# Patient Record
Sex: FEMALE | Race: WHITE | Employment: OTHER | URBAN - METROPOLITAN AREA
[De-identification: names, ages, dates, MRNs, and addresses within clinical notes are randomized per-mention and may not be internally consistent; named-entity substitution may affect disease eponyms.]

---

## 2018-06-20 ENCOUNTER — APPOINTMENT (OUTPATIENT)
Dept: CT IMAGING | Age: 73
DRG: 552 | End: 2018-06-20
Payer: MEDICARE

## 2018-06-20 ENCOUNTER — APPOINTMENT (OUTPATIENT)
Dept: GENERAL RADIOLOGY | Age: 73
DRG: 552 | End: 2018-06-20
Payer: MEDICARE

## 2018-06-20 ENCOUNTER — HOSPITAL ENCOUNTER (INPATIENT)
Age: 73
LOS: 4 days | Discharge: HOME HEALTH | DRG: 552 | End: 2018-06-24
Admitting: INTERNAL MEDICINE
Payer: MEDICARE

## 2018-06-20 DIAGNOSIS — R42 DIZZINESS: ICD-10-CM

## 2018-06-20 DIAGNOSIS — D64.9 ANEMIA, UNSPECIFIED TYPE: Primary | ICD-10-CM

## 2018-06-20 DIAGNOSIS — R55 NEAR SYNCOPE: ICD-10-CM

## 2018-06-20 LAB
-: ABNORMAL
ABSOLUTE EOS #: 0 K/UL (ref 0–0.4)
ABSOLUTE IMMATURE GRANULOCYTE: ABNORMAL K/UL (ref 0–0.3)
ABSOLUTE LYMPH #: 1.3 K/UL (ref 1–4.8)
ABSOLUTE MONO #: 0.4 K/UL (ref 0.2–0.8)
ALBUMIN SERPL-MCNC: 4.1 G/DL (ref 3.5–5.2)
ALBUMIN/GLOBULIN RATIO: ABNORMAL (ref 1–2.5)
ALP BLD-CCNC: 111 U/L (ref 35–104)
ALT SERPL-CCNC: 18 U/L (ref 5–33)
AMORPHOUS: ABNORMAL
AMYLASE: 31 U/L (ref 28–100)
ANION GAP SERPL CALCULATED.3IONS-SCNC: 24 MMOL/L (ref 9–17)
AST SERPL-CCNC: 42 U/L
BACTERIA: ABNORMAL
BASOPHILS # BLD: 1 % (ref 0–2)
BASOPHILS ABSOLUTE: 0.1 K/UL (ref 0–0.2)
BILIRUB SERPL-MCNC: 0.57 MG/DL (ref 0.3–1.2)
BILIRUBIN DIRECT: 0.19 MG/DL
BILIRUBIN URINE: NEGATIVE
BILIRUBIN, INDIRECT: 0.38 MG/DL (ref 0–1)
BNP INTERPRETATION: ABNORMAL
BUN BLDV-MCNC: 6 MG/DL (ref 8–23)
BUN/CREAT BLD: ABNORMAL (ref 9–20)
CALCIUM SERPL-MCNC: 9.1 MG/DL (ref 8.6–10.4)
CASTS UA: ABNORMAL /LPF
CHLORIDE BLD-SCNC: 83 MMOL/L (ref 98–107)
CO2: 22 MMOL/L (ref 20–31)
COLOR: YELLOW
COMMENT UA: ABNORMAL
CREAT SERPL-MCNC: <0.4 MG/DL (ref 0.5–0.9)
CRYSTALS, UA: ABNORMAL /HPF
DATE, STOOL #1: NORMAL
DATE, STOOL #2: NORMAL
DATE, STOOL #3: NORMAL
DIFFERENTIAL TYPE: ABNORMAL
EKG ATRIAL RATE: 101 BPM
EKG P AXIS: 4 DEGREES
EKG P-R INTERVAL: 136 MS
EKG Q-T INTERVAL: 356 MS
EKG QRS DURATION: 84 MS
EKG QTC CALCULATION (BAZETT): 461 MS
EKG R AXIS: 59 DEGREES
EKG T AXIS: 57 DEGREES
EKG VENTRICULAR RATE: 101 BPM
EOSINOPHILS RELATIVE PERCENT: 1 % (ref 1–4)
EPITHELIAL CELLS UA: ABNORMAL /HPF (ref 0–5)
ETHANOL PERCENT: <0.01 %
ETHANOL: <10 MG/DL
GFR AFRICAN AMERICAN: ABNORMAL ML/MIN
GFR NON-AFRICAN AMERICAN: ABNORMAL ML/MIN
GFR SERPL CREATININE-BSD FRML MDRD: ABNORMAL ML/MIN/{1.73_M2}
GFR SERPL CREATININE-BSD FRML MDRD: ABNORMAL ML/MIN/{1.73_M2}
GLOBULIN: ABNORMAL G/DL (ref 1.5–3.8)
GLUCOSE BLD-MCNC: 82 MG/DL (ref 70–99)
GLUCOSE URINE: NEGATIVE
HCT VFR BLD CALC: 25.1 % (ref 36–46)
HEMOCCULT SP1 STL QL: NEGATIVE
HEMOCCULT SP2 STL QL: NORMAL
HEMOCCULT SP3 STL QL: NORMAL
HEMOGLOBIN: 8.1 G/DL (ref 12–16)
IMMATURE GRANULOCYTES: ABNORMAL %
KETONES, URINE: ABNORMAL
LACTIC ACID: 1 MMOL/L (ref 0.5–2.2)
LACTIC ACID: 2.3 MMOL/L (ref 0.5–2.2)
LEUKOCYTE ESTERASE, URINE: ABNORMAL
LIPASE: 18 U/L (ref 13–60)
LYMPHOCYTES # BLD: 18 % (ref 24–44)
MCH RBC QN AUTO: 29.5 PG (ref 26–34)
MCHC RBC AUTO-ENTMCNC: 32.1 G/DL (ref 31–37)
MCV RBC AUTO: 91.9 FL (ref 80–100)
MONOCYTES # BLD: 6 % (ref 1–7)
MUCUS: ABNORMAL
MYOGLOBIN: <21 NG/ML (ref 25–58)
MYOGLOBIN: <21 NG/ML (ref 25–58)
NITRITE, URINE: NEGATIVE
NRBC AUTOMATED: ABNORMAL PER 100 WBC
OTHER OBSERVATIONS UA: ABNORMAL
PDW BLD-RTO: 18.5 % (ref 11.5–14.5)
PH UA: 7 (ref 5–8)
PLATELET # BLD: 350 K/UL (ref 130–400)
PLATELET ESTIMATE: ABNORMAL
PMV BLD AUTO: 8.2 FL (ref 6–12)
POTASSIUM SERPL-SCNC: 3.5 MMOL/L (ref 3.7–5.3)
PRO-BNP: 372 PG/ML
PROTEIN UA: NEGATIVE
RBC # BLD: 2.74 M/UL (ref 4–5.2)
RBC # BLD: ABNORMAL 10*6/UL
RBC UA: ABNORMAL /HPF (ref 0–2)
RENAL EPITHELIAL, UA: ABNORMAL /HPF
SEG NEUTROPHILS: 74 % (ref 36–66)
SEGMENTED NEUTROPHILS ABSOLUTE COUNT: 5.4 K/UL (ref 1.8–7.7)
SODIUM BLD-SCNC: 129 MMOL/L (ref 135–144)
SPECIFIC GRAVITY UA: 1.07 (ref 1–1.03)
TIME, STOOL #1: 1815
TIME, STOOL #2: NORMAL
TIME, STOOL #3: NORMAL
TOTAL PROTEIN: 6.5 G/DL (ref 6.4–8.3)
TRICHOMONAS: ABNORMAL
TROPONIN INTERP: ABNORMAL
TROPONIN INTERP: ABNORMAL
TROPONIN T: <0.03 NG/ML
TROPONIN T: <0.03 NG/ML
TURBIDITY: CLEAR
URINE HGB: NEGATIVE
UROBILINOGEN, URINE: NORMAL
WBC # BLD: 7.2 K/UL (ref 3.5–11)
WBC # BLD: ABNORMAL 10*3/UL
WBC UA: ABNORMAL /HPF (ref 0–5)
YEAST: ABNORMAL

## 2018-06-20 PROCEDURE — 99285 EMERGENCY DEPT VISIT HI MDM: CPT

## 2018-06-20 PROCEDURE — 83605 ASSAY OF LACTIC ACID: CPT

## 2018-06-20 PROCEDURE — 83880 ASSAY OF NATRIURETIC PEPTIDE: CPT

## 2018-06-20 PROCEDURE — 6360000004 HC RX CONTRAST MEDICATION: Performed by: NURSE PRACTITIONER

## 2018-06-20 PROCEDURE — 93005 ELECTROCARDIOGRAM TRACING: CPT

## 2018-06-20 PROCEDURE — 81001 URINALYSIS AUTO W/SCOPE: CPT

## 2018-06-20 PROCEDURE — 71045 X-RAY EXAM CHEST 1 VIEW: CPT

## 2018-06-20 PROCEDURE — 1200000000 HC SEMI PRIVATE

## 2018-06-20 PROCEDURE — 87086 URINE CULTURE/COLONY COUNT: CPT

## 2018-06-20 PROCEDURE — 87329 GIARDIA AG IA: CPT

## 2018-06-20 PROCEDURE — 84484 ASSAY OF TROPONIN QUANT: CPT

## 2018-06-20 PROCEDURE — 72100 X-RAY EXAM L-S SPINE 2/3 VWS: CPT

## 2018-06-20 PROCEDURE — 87425 ROTAVIRUS AG IA: CPT

## 2018-06-20 PROCEDURE — 83690 ASSAY OF LIPASE: CPT

## 2018-06-20 PROCEDURE — 70450 CT HEAD/BRAIN W/O DYE: CPT

## 2018-06-20 PROCEDURE — 74177 CT ABD & PELVIS W/CONTRAST: CPT

## 2018-06-20 PROCEDURE — 87505 NFCT AGENT DETECTION GI: CPT

## 2018-06-20 PROCEDURE — 73521 X-RAY EXAM HIPS BI 2 VIEWS: CPT

## 2018-06-20 PROCEDURE — 72125 CT NECK SPINE W/O DYE: CPT

## 2018-06-20 PROCEDURE — 96360 HYDRATION IV INFUSION INIT: CPT

## 2018-06-20 PROCEDURE — 83874 ASSAY OF MYOGLOBIN: CPT

## 2018-06-20 PROCEDURE — 82150 ASSAY OF AMYLASE: CPT

## 2018-06-20 PROCEDURE — 80048 BASIC METABOLIC PNL TOTAL CA: CPT

## 2018-06-20 PROCEDURE — 87493 C DIFF AMPLIFIED PROBE: CPT

## 2018-06-20 PROCEDURE — 96361 HYDRATE IV INFUSION ADD-ON: CPT

## 2018-06-20 PROCEDURE — 82272 OCCULT BLD FECES 1-3 TESTS: CPT

## 2018-06-20 PROCEDURE — 2580000003 HC RX 258: Performed by: NURSE PRACTITIONER

## 2018-06-20 PROCEDURE — 71250 CT THORAX DX C-: CPT

## 2018-06-20 PROCEDURE — 85025 COMPLETE CBC W/AUTO DIFF WBC: CPT

## 2018-06-20 PROCEDURE — G0480 DRUG TEST DEF 1-7 CLASSES: HCPCS

## 2018-06-20 PROCEDURE — 82705 FATS/LIPIDS FECES QUAL: CPT

## 2018-06-20 PROCEDURE — 80076 HEPATIC FUNCTION PANEL: CPT

## 2018-06-20 RX ORDER — 0.9 % SODIUM CHLORIDE 0.9 %
80 INTRAVENOUS SOLUTION INTRAVENOUS ONCE
Status: COMPLETED | OUTPATIENT
Start: 2018-06-20 | End: 2018-06-20

## 2018-06-20 RX ORDER — SODIUM CHLORIDE 9 MG/ML
INJECTION, SOLUTION INTRAVENOUS CONTINUOUS
Status: DISCONTINUED | OUTPATIENT
Start: 2018-06-20 | End: 2018-06-21

## 2018-06-20 RX ORDER — SODIUM CHLORIDE 0.9 % (FLUSH) 0.9 %
10 SYRINGE (ML) INJECTION PRN
Status: DISCONTINUED | OUTPATIENT
Start: 2018-06-20 | End: 2018-06-21

## 2018-06-20 RX ORDER — 0.9 % SODIUM CHLORIDE 0.9 %
500 INTRAVENOUS SOLUTION INTRAVENOUS ONCE
Status: COMPLETED | OUTPATIENT
Start: 2018-06-20 | End: 2018-06-20

## 2018-06-20 RX ADMIN — Medication 10 ML: at 17:59

## 2018-06-20 RX ADMIN — SODIUM CHLORIDE: 9 INJECTION, SOLUTION INTRAVENOUS at 18:09

## 2018-06-20 RX ADMIN — SODIUM CHLORIDE 80 ML: 9 INJECTION, SOLUTION INTRAVENOUS at 17:59

## 2018-06-20 RX ADMIN — SODIUM CHLORIDE 500 ML: 0.9 INJECTION, SOLUTION INTRAVENOUS at 17:15

## 2018-06-20 RX ADMIN — IOPAMIDOL 75 ML: 755 INJECTION, SOLUTION INTRAVENOUS at 17:59

## 2018-06-20 ASSESSMENT — ENCOUNTER SYMPTOMS
DIARRHEA: 1
SHORTNESS OF BREATH: 0
NAUSEA: 1
COLOR CHANGE: 0
ABDOMINAL PAIN: 1
COUGH: 0

## 2018-06-20 ASSESSMENT — PAIN DESCRIPTION - PAIN TYPE: TYPE: ACUTE PAIN

## 2018-06-20 ASSESSMENT — PAIN DESCRIPTION - DESCRIPTORS: DESCRIPTORS: ACHING

## 2018-06-20 ASSESSMENT — PAIN SCALES - GENERAL: PAINLEVEL_OUTOF10: 8

## 2018-06-21 ENCOUNTER — APPOINTMENT (OUTPATIENT)
Dept: MRI IMAGING | Age: 73
DRG: 552 | End: 2018-06-21
Payer: MEDICARE

## 2018-06-21 PROBLEM — Z72.0 TOBACCO ABUSE: Status: ACTIVE | Noted: 2018-06-21

## 2018-06-21 PROBLEM — F10.10 CHRONIC ALCOHOL ABUSE: Status: ACTIVE | Noted: 2018-06-21

## 2018-06-21 PROBLEM — R91.8 PULMONARY NODULES/LESIONS, MULTIPLE: Status: ACTIVE | Noted: 2018-06-21

## 2018-06-21 PROBLEM — R29.6 RECURRENT FALLS WHILE WALKING: Status: ACTIVE | Noted: 2018-06-21

## 2018-06-21 PROBLEM — I73.9 PVD (PERIPHERAL VASCULAR DISEASE) (HCC): Status: ACTIVE | Noted: 2018-06-21

## 2018-06-21 LAB
ABSOLUTE EOS #: 0.1 K/UL (ref 0–0.4)
ABSOLUTE IMMATURE GRANULOCYTE: ABNORMAL K/UL (ref 0–0.3)
ABSOLUTE LYMPH #: 1.6 K/UL (ref 1–4.8)
ABSOLUTE MONO #: 0.8 K/UL (ref 0.2–0.8)
ABSOLUTE RETIC #: 0.01 M/UL (ref 0.03–0.08)
ANION GAP SERPL CALCULATED.3IONS-SCNC: 23 MMOL/L (ref 9–17)
BASOPHILS # BLD: 1 % (ref 0–2)
BASOPHILS ABSOLUTE: 0 K/UL (ref 0–0.2)
BUN BLDV-MCNC: 5 MG/DL (ref 8–23)
BUN/CREAT BLD: ABNORMAL (ref 9–20)
CALCIUM SERPL-MCNC: 8.3 MG/DL (ref 8.6–10.4)
CHLORIDE BLD-SCNC: 88 MMOL/L (ref 98–107)
CO2: 19 MMOL/L (ref 20–31)
CREAT SERPL-MCNC: <0.4 MG/DL (ref 0.5–0.9)
CULTURE: NORMAL
DIFFERENTIAL TYPE: ABNORMAL
EOSINOPHILS RELATIVE PERCENT: 2 % (ref 1–4)
FERRITIN: 841 UG/L (ref 13–150)
FOLATE: 5.7 NG/ML
GFR AFRICAN AMERICAN: ABNORMAL ML/MIN
GFR NON-AFRICAN AMERICAN: ABNORMAL ML/MIN
GFR SERPL CREATININE-BSD FRML MDRD: ABNORMAL ML/MIN/{1.73_M2}
GFR SERPL CREATININE-BSD FRML MDRD: ABNORMAL ML/MIN/{1.73_M2}
GLUCOSE BLD-MCNC: 70 MG/DL (ref 70–99)
HCT VFR BLD CALC: 22.1 % (ref 36–46)
HCT VFR BLD CALC: 23.1 % (ref 36–46)
HEMOGLOBIN: 7.2 G/DL (ref 12–16)
HEMOGLOBIN: 7.6 G/DL (ref 12–16)
IMMATURE GRANULOCYTES: ABNORMAL %
IMMATURE RETIC FRACT: 10.1 % (ref 2.7–18.3)
INR BLD: 1
IRON SATURATION: ABNORMAL % (ref 20–55)
IRON: 179 UG/DL (ref 37–145)
LYMPHOCYTES # BLD: 29 % (ref 24–44)
Lab: NORMAL
MAGNESIUM: 1.6 MG/DL (ref 1.6–2.6)
MCH RBC QN AUTO: 30 PG (ref 26–34)
MCHC RBC AUTO-ENTMCNC: 32.6 G/DL (ref 31–37)
MCV RBC AUTO: 91.9 FL (ref 80–100)
MONOCYTES # BLD: 14 % (ref 1–7)
NRBC AUTOMATED: ABNORMAL PER 100 WBC
PDW BLD-RTO: 18.3 % (ref 11.5–14.5)
PLATELET # BLD: 316 K/UL (ref 130–400)
PLATELET ESTIMATE: ABNORMAL
PMV BLD AUTO: 8.4 FL (ref 6–12)
POTASSIUM SERPL-SCNC: 3.3 MMOL/L (ref 3.7–5.3)
PROTHROMBIN TIME: 10.6 SEC (ref 9.7–11.6)
RBC # BLD: 2.4 M/UL (ref 4–5.2)
RBC # BLD: ABNORMAL 10*6/UL
RETIC %: 0.5 % (ref 0.5–1.9)
RETIC HEMOGLOBIN: 27.6 PG (ref 28.2–35.7)
SEG NEUTROPHILS: 54 % (ref 36–66)
SEGMENTED NEUTROPHILS ABSOLUTE COUNT: 3 K/UL (ref 1.8–7.7)
SODIUM BLD-SCNC: 130 MMOL/L (ref 135–144)
SPECIMEN DESCRIPTION: NORMAL
STATUS: NORMAL
SURGICAL PATHOLOGY REPORT: NORMAL
TOTAL IRON BINDING CAPACITY: ABNORMAL UG/DL (ref 250–450)
UNSATURATED IRON BINDING CAPACITY: <17 UG/DL (ref 112–347)
VITAMIN B-12: 715 PG/ML (ref 232–1245)
WBC # BLD: 5.6 K/UL (ref 3.5–11)
WBC # BLD: ABNORMAL 10*3/UL

## 2018-06-21 PROCEDURE — 1200000000 HC SEMI PRIVATE

## 2018-06-21 PROCEDURE — 2580000003 HC RX 258: Performed by: NURSE PRACTITIONER

## 2018-06-21 PROCEDURE — C9113 INJ PANTOPRAZOLE SODIUM, VIA: HCPCS | Performed by: NURSE PRACTITIONER

## 2018-06-21 PROCEDURE — G8987 SELF CARE CURRENT STATUS: HCPCS

## 2018-06-21 PROCEDURE — 83735 ASSAY OF MAGNESIUM: CPT

## 2018-06-21 PROCEDURE — 94640 AIRWAY INHALATION TREATMENT: CPT

## 2018-06-21 PROCEDURE — 6370000000 HC RX 637 (ALT 250 FOR IP): Performed by: NURSE PRACTITIONER

## 2018-06-21 PROCEDURE — 72148 MRI LUMBAR SPINE W/O DYE: CPT

## 2018-06-21 PROCEDURE — 99223 1ST HOSP IP/OBS HIGH 75: CPT | Performed by: INTERNAL MEDICINE

## 2018-06-21 PROCEDURE — 85014 HEMATOCRIT: CPT

## 2018-06-21 PROCEDURE — 72146 MRI CHEST SPINE W/O DYE: CPT

## 2018-06-21 PROCEDURE — 85018 HEMOGLOBIN: CPT

## 2018-06-21 PROCEDURE — 6370000000 HC RX 637 (ALT 250 FOR IP): Performed by: INTERNAL MEDICINE

## 2018-06-21 PROCEDURE — G8988 SELF CARE GOAL STATUS: HCPCS

## 2018-06-21 PROCEDURE — 83540 ASSAY OF IRON: CPT

## 2018-06-21 PROCEDURE — 97530 THERAPEUTIC ACTIVITIES: CPT

## 2018-06-21 PROCEDURE — 97165 OT EVAL LOW COMPLEX 30 MIN: CPT

## 2018-06-21 PROCEDURE — 85610 PROTHROMBIN TIME: CPT

## 2018-06-21 PROCEDURE — 83550 IRON BINDING TEST: CPT

## 2018-06-21 PROCEDURE — 85045 AUTOMATED RETICULOCYTE COUNT: CPT

## 2018-06-21 PROCEDURE — 82746 ASSAY OF FOLIC ACID SERUM: CPT

## 2018-06-21 PROCEDURE — 82728 ASSAY OF FERRITIN: CPT

## 2018-06-21 PROCEDURE — 80048 BASIC METABOLIC PNL TOTAL CA: CPT

## 2018-06-21 PROCEDURE — 72141 MRI NECK SPINE W/O DYE: CPT

## 2018-06-21 PROCEDURE — 6360000002 HC RX W HCPCS: Performed by: NURSE PRACTITIONER

## 2018-06-21 PROCEDURE — 94760 N-INVAS EAR/PLS OXIMETRY 1: CPT

## 2018-06-21 PROCEDURE — 82607 VITAMIN B-12: CPT

## 2018-06-21 PROCEDURE — 85027 COMPLETE CBC AUTOMATED: CPT

## 2018-06-21 PROCEDURE — 36415 COLL VENOUS BLD VENIPUNCTURE: CPT

## 2018-06-21 RX ORDER — GABAPENTIN 300 MG/1
300 CAPSULE ORAL 3 TIMES DAILY
Status: DISCONTINUED | OUTPATIENT
Start: 2018-06-21 | End: 2018-06-24 | Stop reason: HOSPADM

## 2018-06-21 RX ORDER — NICOTINE 21 MG/24HR
1 PATCH, TRANSDERMAL 24 HOURS TRANSDERMAL DAILY
Status: DISCONTINUED | OUTPATIENT
Start: 2018-06-21 | End: 2018-06-24 | Stop reason: HOSPADM

## 2018-06-21 RX ORDER — LORAZEPAM 2 MG/ML
4 INJECTION INTRAMUSCULAR
Status: DISCONTINUED | OUTPATIENT
Start: 2018-06-21 | End: 2018-06-24

## 2018-06-21 RX ORDER — PANTOPRAZOLE SODIUM 40 MG/10ML
40 INJECTION, POWDER, LYOPHILIZED, FOR SOLUTION INTRAVENOUS EVERY 12 HOURS
Status: DISCONTINUED | OUTPATIENT
Start: 2018-06-21 | End: 2018-06-22

## 2018-06-21 RX ORDER — LORAZEPAM 2 MG/ML
2 INJECTION INTRAMUSCULAR
Status: DISCONTINUED | OUTPATIENT
Start: 2018-06-21 | End: 2018-06-24

## 2018-06-21 RX ORDER — LANOLIN ALCOHOL/MO/W.PET/CERES
1000 CREAM (GRAM) TOPICAL DAILY
Status: DISCONTINUED | OUTPATIENT
Start: 2018-06-21 | End: 2018-06-24 | Stop reason: HOSPADM

## 2018-06-21 RX ORDER — MORPHINE SULFATE 4 MG/ML
4 INJECTION, SOLUTION INTRAMUSCULAR; INTRAVENOUS
Status: DISCONTINUED | OUTPATIENT
Start: 2018-06-21 | End: 2018-06-24

## 2018-06-21 RX ORDER — CITALOPRAM 20 MG/1
40 TABLET ORAL DAILY
Status: DISCONTINUED | OUTPATIENT
Start: 2018-06-21 | End: 2018-06-24

## 2018-06-21 RX ORDER — POTASSIUM CHLORIDE 1.5 G/1.77G
20 POWDER, FOR SOLUTION ORAL 2 TIMES DAILY
COMMUNITY

## 2018-06-21 RX ORDER — ALPRAZOLAM 0.25 MG/1
0.25 TABLET ORAL 3 TIMES DAILY PRN
Status: ON HOLD | COMMUNITY
End: 2018-12-21

## 2018-06-21 RX ORDER — LORAZEPAM 1 MG/1
1 TABLET ORAL
Status: DISCONTINUED | OUTPATIENT
Start: 2018-06-21 | End: 2018-06-24

## 2018-06-21 RX ORDER — LEVOFLOXACIN 500 MG/1
750 TABLET, FILM COATED ORAL DAILY
Status: DISCONTINUED | OUTPATIENT
Start: 2018-06-21 | End: 2018-06-24 | Stop reason: HOSPADM

## 2018-06-21 RX ORDER — METOPROLOL TARTRATE 100 MG/1
50 TABLET ORAL 2 TIMES DAILY
COMMUNITY

## 2018-06-21 RX ORDER — LORAZEPAM 2 MG/ML
3 INJECTION INTRAMUSCULAR
Status: DISCONTINUED | OUTPATIENT
Start: 2018-06-21 | End: 2018-06-24

## 2018-06-21 RX ORDER — LORAZEPAM 1 MG/1
4 TABLET ORAL
Status: DISCONTINUED | OUTPATIENT
Start: 2018-06-21 | End: 2018-06-24

## 2018-06-21 RX ORDER — LORAZEPAM 1 MG/1
2 TABLET ORAL
Status: DISCONTINUED | OUTPATIENT
Start: 2018-06-21 | End: 2018-06-24

## 2018-06-21 RX ORDER — ONDANSETRON 4 MG/1
4 TABLET, ORALLY DISINTEGRATING ORAL EVERY 6 HOURS PRN
Status: DISCONTINUED | OUTPATIENT
Start: 2018-06-21 | End: 2018-06-24 | Stop reason: HOSPADM

## 2018-06-21 RX ORDER — SODIUM CHLORIDE 9 MG/ML
INJECTION, SOLUTION INTRAVENOUS CONTINUOUS
Status: DISCONTINUED | OUTPATIENT
Start: 2018-06-21 | End: 2018-06-21

## 2018-06-21 RX ORDER — ATORVASTATIN CALCIUM 10 MG/1
10 TABLET, FILM COATED ORAL DAILY
COMMUNITY

## 2018-06-21 RX ORDER — LANOLIN ALCOHOL/MO/W.PET/CERES
1000 CREAM (GRAM) TOPICAL DAILY
Status: ON HOLD | COMMUNITY
End: 2018-12-19 | Stop reason: SDUPTHER

## 2018-06-21 RX ORDER — FOLIC ACID 1 MG/1
1 TABLET ORAL DAILY
Status: DISCONTINUED | OUTPATIENT
Start: 2018-06-21 | End: 2018-06-24 | Stop reason: HOSPADM

## 2018-06-21 RX ORDER — ONDANSETRON 2 MG/ML
4 INJECTION INTRAMUSCULAR; INTRAVENOUS EVERY 6 HOURS PRN
Status: DISCONTINUED | OUTPATIENT
Start: 2018-06-21 | End: 2018-06-24 | Stop reason: HOSPADM

## 2018-06-21 RX ORDER — THIAMINE MONONITRATE (VIT B1) 100 MG
100 TABLET ORAL DAILY
Status: DISCONTINUED | OUTPATIENT
Start: 2018-06-21 | End: 2018-06-24 | Stop reason: HOSPADM

## 2018-06-21 RX ORDER — SODIUM CHLORIDE 0.9 % (FLUSH) 0.9 %
10 SYRINGE (ML) INJECTION EVERY 12 HOURS SCHEDULED
Status: DISCONTINUED | OUTPATIENT
Start: 2018-06-21 | End: 2018-06-24 | Stop reason: HOSPADM

## 2018-06-21 RX ORDER — SODIUM CHLORIDE 0.9 % (FLUSH) 0.9 %
10 SYRINGE (ML) INJECTION PRN
Status: DISCONTINUED | OUTPATIENT
Start: 2018-06-21 | End: 2018-06-22 | Stop reason: SDUPTHER

## 2018-06-21 RX ORDER — ALPRAZOLAM 0.25 MG/1
0.25 TABLET ORAL 3 TIMES DAILY PRN
Status: DISCONTINUED | OUTPATIENT
Start: 2018-06-21 | End: 2018-06-24

## 2018-06-21 RX ORDER — POTASSIUM CHLORIDE 20 MEQ/1
20 TABLET, EXTENDED RELEASE ORAL 2 TIMES DAILY
Status: DISCONTINUED | OUTPATIENT
Start: 2018-06-21 | End: 2018-06-22

## 2018-06-21 RX ORDER — LOSARTAN POTASSIUM 25 MG/1
25 TABLET ORAL DAILY
COMMUNITY

## 2018-06-21 RX ORDER — SODIUM CHLORIDE 1000 MG
1 TABLET, SOLUBLE MISCELLANEOUS 3 TIMES DAILY PRN
COMMUNITY

## 2018-06-21 RX ORDER — CLOPIDOGREL BISULFATE 75 MG/1
75 TABLET ORAL DAILY
COMMUNITY

## 2018-06-21 RX ORDER — ISOSORBIDE MONONITRATE 30 MG/1
30 TABLET, EXTENDED RELEASE ORAL DAILY
COMMUNITY

## 2018-06-21 RX ORDER — ACETAMINOPHEN 325 MG/1
650 TABLET ORAL EVERY 4 HOURS PRN
Status: DISCONTINUED | OUTPATIENT
Start: 2018-06-21 | End: 2018-06-24 | Stop reason: HOSPADM

## 2018-06-21 RX ORDER — GABAPENTIN 300 MG/1
300 CAPSULE ORAL 3 TIMES DAILY
COMMUNITY

## 2018-06-21 RX ORDER — LOSARTAN POTASSIUM 25 MG/1
25 TABLET ORAL DAILY
Status: DISCONTINUED | OUTPATIENT
Start: 2018-06-21 | End: 2018-06-22

## 2018-06-21 RX ORDER — CITALOPRAM 40 MG/1
40 TABLET ORAL DAILY
Status: ON HOLD | COMMUNITY
End: 2018-12-21

## 2018-06-21 RX ORDER — MORPHINE SULFATE 2 MG/ML
2 INJECTION, SOLUTION INTRAMUSCULAR; INTRAVENOUS
Status: DISCONTINUED | OUTPATIENT
Start: 2018-06-21 | End: 2018-06-24

## 2018-06-21 RX ORDER — ISOSORBIDE MONONITRATE 30 MG/1
30 TABLET, EXTENDED RELEASE ORAL DAILY
Status: DISCONTINUED | OUTPATIENT
Start: 2018-06-21 | End: 2018-06-24 | Stop reason: HOSPADM

## 2018-06-21 RX ORDER — LORAZEPAM 1 MG/1
3 TABLET ORAL
Status: DISCONTINUED | OUTPATIENT
Start: 2018-06-21 | End: 2018-06-24

## 2018-06-21 RX ORDER — METOPROLOL TARTRATE 50 MG/1
50 TABLET, FILM COATED ORAL 2 TIMES DAILY
Status: DISCONTINUED | OUTPATIENT
Start: 2018-06-21 | End: 2018-06-22

## 2018-06-21 RX ORDER — WITCH HAZEL 50 %
1 PADS, MEDICATED (EA) TOPICAL 2 TIMES DAILY
Status: ON HOLD | COMMUNITY
End: 2018-12-21 | Stop reason: HOSPADM

## 2018-06-21 RX ORDER — IPRATROPIUM BROMIDE AND ALBUTEROL SULFATE 2.5; .5 MG/3ML; MG/3ML
1 SOLUTION RESPIRATORY (INHALATION)
Status: DISCONTINUED | OUTPATIENT
Start: 2018-06-21 | End: 2018-06-24 | Stop reason: HOSPADM

## 2018-06-21 RX ORDER — LORAZEPAM 2 MG/ML
1 INJECTION INTRAMUSCULAR
Status: DISCONTINUED | OUTPATIENT
Start: 2018-06-21 | End: 2018-06-24

## 2018-06-21 RX ORDER — SODIUM CHLORIDE 1000 MG
1 TABLET, SOLUBLE MISCELLANEOUS
Status: DISCONTINUED | OUTPATIENT
Start: 2018-06-21 | End: 2018-06-24 | Stop reason: HOSPADM

## 2018-06-21 RX ORDER — ONDANSETRON 2 MG/ML
4 INJECTION INTRAMUSCULAR; INTRAVENOUS EVERY 6 HOURS PRN
Status: DISCONTINUED | OUTPATIENT
Start: 2018-06-21 | End: 2018-06-21

## 2018-06-21 RX ORDER — ASPIRIN 81 MG/1
81 TABLET ORAL DAILY
Status: DISCONTINUED | OUTPATIENT
Start: 2018-06-21 | End: 2018-06-24 | Stop reason: HOSPADM

## 2018-06-21 RX ORDER — 0.9 % SODIUM CHLORIDE 0.9 %
10 VIAL (ML) INJECTION EVERY 12 HOURS
Status: DISCONTINUED | OUTPATIENT
Start: 2018-06-21 | End: 2018-06-22

## 2018-06-21 RX ORDER — MULTIVITAMIN WITH FOLIC ACID 400 MCG
1 TABLET ORAL DAILY
Status: DISCONTINUED | OUTPATIENT
Start: 2018-06-21 | End: 2018-06-24 | Stop reason: HOSPADM

## 2018-06-21 RX ORDER — CLOPIDOGREL BISULFATE 75 MG/1
75 TABLET ORAL DAILY
Status: DISCONTINUED | OUTPATIENT
Start: 2018-06-21 | End: 2018-06-21

## 2018-06-21 RX ORDER — ATORVASTATIN CALCIUM 10 MG/1
10 TABLET, FILM COATED ORAL DAILY
Status: DISCONTINUED | OUTPATIENT
Start: 2018-06-21 | End: 2018-06-24 | Stop reason: HOSPADM

## 2018-06-21 RX ADMIN — PANTOPRAZOLE SODIUM 40 MG: 40 INJECTION, POWDER, FOR SOLUTION INTRAVENOUS at 14:21

## 2018-06-21 RX ADMIN — Medication 100 MG: at 10:36

## 2018-06-21 RX ADMIN — PANTOPRAZOLE SODIUM 40 MG: 40 INJECTION, POWDER, FOR SOLUTION INTRAVENOUS at 04:29

## 2018-06-21 RX ADMIN — GABAPENTIN 300 MG: 300 CAPSULE ORAL at 10:35

## 2018-06-21 RX ADMIN — GABAPENTIN 300 MG: 300 CAPSULE ORAL at 21:00

## 2018-06-21 RX ADMIN — Medication 10 ML: at 21:00

## 2018-06-21 RX ADMIN — LEVOFLOXACIN 750 MG: 500 TABLET, FILM COATED ORAL at 14:21

## 2018-06-21 RX ADMIN — ISOSORBIDE MONONITRATE 30 MG: 30 TABLET ORAL at 10:35

## 2018-06-21 RX ADMIN — CITALOPRAM HYDROBROMIDE 40 MG: 20 TABLET ORAL at 10:35

## 2018-06-21 RX ADMIN — SODIUM CHLORIDE TAB 1 GM 1 G: 1 TAB at 17:14

## 2018-06-21 RX ADMIN — SODIUM CHLORIDE: 9 INJECTION, SOLUTION INTRAVENOUS at 04:29

## 2018-06-21 RX ADMIN — CYANOCOBALAMIN TAB 1000 MCG 1000 MCG: 1000 TAB at 10:35

## 2018-06-21 RX ADMIN — POTASSIUM CHLORIDE 20 MEQ: 20 TABLET, EXTENDED RELEASE ORAL at 10:36

## 2018-06-21 RX ADMIN — METOPROLOL TARTRATE 50 MG: 50 TABLET ORAL at 10:43

## 2018-06-21 RX ADMIN — SODIUM CHLORIDE TAB 1 GM 1 G: 1 TAB at 10:35

## 2018-06-21 RX ADMIN — Medication 10 ML: at 14:22

## 2018-06-21 RX ADMIN — Medication 10 ML: at 04:31

## 2018-06-21 RX ADMIN — LOSARTAN POTASSIUM 25 MG: 25 TABLET ORAL at 10:36

## 2018-06-21 RX ADMIN — IPRATROPIUM BROMIDE AND ALBUTEROL SULFATE 1 AMPULE: .5; 3 SOLUTION RESPIRATORY (INHALATION) at 19:32

## 2018-06-21 RX ADMIN — FOLIC ACID 1 MG: 1 TABLET ORAL at 10:35

## 2018-06-21 RX ADMIN — MULTIVITAMIN TABLET 1 TABLET: TABLET at 10:43

## 2018-06-21 RX ADMIN — ASPIRIN 81 MG: 81 TABLET, COATED ORAL at 10:35

## 2018-06-21 RX ADMIN — MOMETASONE FUROATE AND FORMOTEROL FUMARATE DIHYDRATE 2 PUFF: 200; 5 AEROSOL RESPIRATORY (INHALATION) at 19:34

## 2018-06-21 RX ADMIN — GABAPENTIN 300 MG: 300 CAPSULE ORAL at 14:21

## 2018-06-21 RX ADMIN — POTASSIUM CHLORIDE 20 MEQ: 20 TABLET, EXTENDED RELEASE ORAL at 21:00

## 2018-06-21 RX ADMIN — ATORVASTATIN CALCIUM 10 MG: 10 TABLET, FILM COATED ORAL at 10:43

## 2018-06-21 RX ADMIN — IPRATROPIUM BROMIDE AND ALBUTEROL SULFATE 1 AMPULE: .5; 3 SOLUTION RESPIRATORY (INHALATION) at 14:26

## 2018-06-21 RX ADMIN — SODIUM CHLORIDE TAB 1 GM 1 G: 1 TAB at 12:04

## 2018-06-21 ASSESSMENT — PAIN SCALES - GENERAL: PAINLEVEL_OUTOF10: 0

## 2018-06-22 ENCOUNTER — APPOINTMENT (OUTPATIENT)
Dept: GENERAL RADIOLOGY | Age: 73
DRG: 552 | End: 2018-06-22
Payer: MEDICARE

## 2018-06-22 LAB
ABSOLUTE EOS #: 0.1 K/UL (ref 0–0.4)
ABSOLUTE IMMATURE GRANULOCYTE: ABNORMAL K/UL (ref 0–0.3)
ABSOLUTE LYMPH #: 1.6 K/UL (ref 1–4.8)
ABSOLUTE MONO #: 0.5 K/UL (ref 0.2–0.8)
ANION GAP SERPL CALCULATED.3IONS-SCNC: 12 MMOL/L (ref 9–17)
BASOPHILS # BLD: 1 % (ref 0–2)
BASOPHILS ABSOLUTE: 0 K/UL (ref 0–0.2)
BUN BLDV-MCNC: 6 MG/DL (ref 8–23)
BUN/CREAT BLD: ABNORMAL (ref 9–20)
CALCIUM SERPL-MCNC: 8.4 MG/DL (ref 8.6–10.4)
CHLORIDE BLD-SCNC: 90 MMOL/L (ref 98–107)
CO2: 23 MMOL/L (ref 20–31)
CREAT SERPL-MCNC: <0.4 MG/DL (ref 0.5–0.9)
DIFFERENTIAL TYPE: ABNORMAL
EOSINOPHILS RELATIVE PERCENT: 2 % (ref 1–4)
GFR AFRICAN AMERICAN: ABNORMAL ML/MIN
GFR NON-AFRICAN AMERICAN: ABNORMAL ML/MIN
GFR SERPL CREATININE-BSD FRML MDRD: ABNORMAL ML/MIN/{1.73_M2}
GFR SERPL CREATININE-BSD FRML MDRD: ABNORMAL ML/MIN/{1.73_M2}
GLUCOSE BLD-MCNC: 115 MG/DL (ref 70–99)
HAPTOGLOBIN: 165 MG/DL (ref 30–200)
HCT VFR BLD CALC: 19.8 % (ref 36–46)
HCT VFR BLD CALC: 25.4 % (ref 36–46)
HEMOGLOBIN: 6.4 G/DL (ref 12–16)
HEMOGLOBIN: 8.2 G/DL (ref 12–16)
IMMATURE GRANULOCYTES: ABNORMAL %
LACTATE DEHYDROGENASE: 257 U/L (ref 135–214)
LYMPHOCYTES # BLD: 29 % (ref 24–44)
MCH RBC QN AUTO: 29.6 PG (ref 26–34)
MCHC RBC AUTO-ENTMCNC: 32.4 G/DL (ref 31–37)
MCV RBC AUTO: 91.6 FL (ref 80–100)
MONOCYTES # BLD: 10 % (ref 1–7)
NRBC AUTOMATED: ABNORMAL PER 100 WBC
PATHOLOGIST REVIEW: NORMAL
PDW BLD-RTO: 18.3 % (ref 11.5–14.5)
PLATELET # BLD: 311 K/UL (ref 130–400)
PLATELET ESTIMATE: ABNORMAL
PMV BLD AUTO: 8.7 FL (ref 6–12)
POTASSIUM SERPL-SCNC: 3.2 MMOL/L (ref 3.7–5.3)
RBC # BLD: 2.16 M/UL (ref 4–5.2)
RBC # BLD: ABNORMAL 10*6/UL
SEG NEUTROPHILS: 58 % (ref 36–66)
SEGMENTED NEUTROPHILS ABSOLUTE COUNT: 3.1 K/UL (ref 1.8–7.7)
SODIUM BLD-SCNC: 125 MMOL/L (ref 135–144)
SODIUM BLD-SCNC: 128 MMOL/L (ref 135–144)
SODIUM BLD-SCNC: 130 MMOL/L (ref 135–144)
WBC # BLD: 5.3 K/UL (ref 3.5–11)
WBC # BLD: ABNORMAL 10*3/UL

## 2018-06-22 PROCEDURE — P9016 RBC LEUKOCYTES REDUCED: HCPCS

## 2018-06-22 PROCEDURE — 36415 COLL VENOUS BLD VENIPUNCTURE: CPT

## 2018-06-22 PROCEDURE — 6370000000 HC RX 637 (ALT 250 FOR IP): Performed by: INTERNAL MEDICINE

## 2018-06-22 PROCEDURE — 80048 BASIC METABOLIC PNL TOTAL CA: CPT

## 2018-06-22 PROCEDURE — 97116 GAIT TRAINING THERAPY: CPT

## 2018-06-22 PROCEDURE — 94760 N-INVAS EAR/PLS OXIMETRY 1: CPT

## 2018-06-22 PROCEDURE — C9113 INJ PANTOPRAZOLE SODIUM, VIA: HCPCS | Performed by: NURSE PRACTITIONER

## 2018-06-22 PROCEDURE — 83010 ASSAY OF HAPTOGLOBIN QUANT: CPT

## 2018-06-22 PROCEDURE — 6370000000 HC RX 637 (ALT 250 FOR IP): Performed by: NURSE PRACTITIONER

## 2018-06-22 PROCEDURE — 86920 COMPATIBILITY TEST SPIN: CPT

## 2018-06-22 PROCEDURE — 2580000003 HC RX 258: Performed by: INTERNAL MEDICINE

## 2018-06-22 PROCEDURE — 83615 LACTATE (LD) (LDH) ENZYME: CPT

## 2018-06-22 PROCEDURE — 85018 HEMOGLOBIN: CPT

## 2018-06-22 PROCEDURE — 85014 HEMATOCRIT: CPT

## 2018-06-22 PROCEDURE — 71045 X-RAY EXAM CHEST 1 VIEW: CPT

## 2018-06-22 PROCEDURE — 2580000003 HC RX 258: Performed by: NURSE PRACTITIONER

## 2018-06-22 PROCEDURE — 6360000002 HC RX W HCPCS: Performed by: NURSE PRACTITIONER

## 2018-06-22 PROCEDURE — 1200000000 HC SEMI PRIVATE

## 2018-06-22 PROCEDURE — 85025 COMPLETE CBC W/AUTO DIFF WBC: CPT

## 2018-06-22 PROCEDURE — G8979 MOBILITY GOAL STATUS: HCPCS

## 2018-06-22 PROCEDURE — 99232 SBSQ HOSP IP/OBS MODERATE 35: CPT | Performed by: INTERNAL MEDICINE

## 2018-06-22 PROCEDURE — 99222 1ST HOSP IP/OBS MODERATE 55: CPT | Performed by: INTERNAL MEDICINE

## 2018-06-22 PROCEDURE — 36430 TRANSFUSION BLD/BLD COMPNT: CPT

## 2018-06-22 PROCEDURE — 94640 AIRWAY INHALATION TREATMENT: CPT

## 2018-06-22 PROCEDURE — 97530 THERAPEUTIC ACTIVITIES: CPT

## 2018-06-22 PROCEDURE — G8978 MOBILITY CURRENT STATUS: HCPCS

## 2018-06-22 PROCEDURE — 86901 BLOOD TYPING SEROLOGIC RH(D): CPT

## 2018-06-22 PROCEDURE — 97162 PT EVAL MOD COMPLEX 30 MIN: CPT

## 2018-06-22 PROCEDURE — 84295 ASSAY OF SERUM SODIUM: CPT

## 2018-06-22 PROCEDURE — 86850 RBC ANTIBODY SCREEN: CPT

## 2018-06-22 PROCEDURE — 86900 BLOOD TYPING SEROLOGIC ABO: CPT

## 2018-06-22 RX ORDER — SODIUM CHLORIDE 1000 MG
1 TABLET, SOLUBLE MISCELLANEOUS
Status: DISCONTINUED | OUTPATIENT
Start: 2018-06-22 | End: 2018-06-22 | Stop reason: SDUPTHER

## 2018-06-22 RX ORDER — POTASSIUM CHLORIDE 20 MEQ/1
40 TABLET, EXTENDED RELEASE ORAL 2 TIMES DAILY WITH MEALS
Status: COMPLETED | OUTPATIENT
Start: 2018-06-22 | End: 2018-06-22

## 2018-06-22 RX ORDER — 0.9 % SODIUM CHLORIDE 0.9 %
250 INTRAVENOUS SOLUTION INTRAVENOUS ONCE
Status: COMPLETED | OUTPATIENT
Start: 2018-06-22 | End: 2018-06-23

## 2018-06-22 RX ORDER — SODIUM CHLORIDE 0.9 % (FLUSH) 0.9 %
10 SYRINGE (ML) INJECTION PRN
Status: DISCONTINUED | OUTPATIENT
Start: 2018-06-22 | End: 2018-06-24 | Stop reason: HOSPADM

## 2018-06-22 RX ADMIN — IPRATROPIUM BROMIDE AND ALBUTEROL SULFATE 1 AMPULE: .5; 3 SOLUTION RESPIRATORY (INHALATION) at 08:01

## 2018-06-22 RX ADMIN — MULTIVITAMIN TABLET 1 TABLET: TABLET at 08:42

## 2018-06-22 RX ADMIN — MAGNESIUM OXIDE TAB 400 MG (241.3 MG ELEMENTAL MG) 400 MG: 400 (241.3 MG) TAB at 08:45

## 2018-06-22 RX ADMIN — CITALOPRAM HYDROBROMIDE 40 MG: 20 TABLET ORAL at 08:41

## 2018-06-22 RX ADMIN — FOLIC ACID 1 MG: 1 TABLET ORAL at 08:42

## 2018-06-22 RX ADMIN — GABAPENTIN 300 MG: 300 CAPSULE ORAL at 20:38

## 2018-06-22 RX ADMIN — SODIUM CHLORIDE TAB 1 GM 1 G: 1 TAB at 12:19

## 2018-06-22 RX ADMIN — Medication 100 MG: at 08:42

## 2018-06-22 RX ADMIN — ISOSORBIDE MONONITRATE 30 MG: 30 TABLET ORAL at 08:41

## 2018-06-22 RX ADMIN — GABAPENTIN 300 MG: 300 CAPSULE ORAL at 08:41

## 2018-06-22 RX ADMIN — Medication 10 ML: at 02:32

## 2018-06-22 RX ADMIN — IPRATROPIUM BROMIDE AND ALBUTEROL SULFATE 1 AMPULE: .5; 3 SOLUTION RESPIRATORY (INHALATION) at 19:35

## 2018-06-22 RX ADMIN — SODIUM CHLORIDE TAB 1 GM 1 G: 1 TAB at 08:42

## 2018-06-22 RX ADMIN — METOPROLOL TARTRATE 50 MG: 50 TABLET ORAL at 08:42

## 2018-06-22 RX ADMIN — MOMETASONE FUROATE AND FORMOTEROL FUMARATE DIHYDRATE 2 PUFF: 200; 5 AEROSOL RESPIRATORY (INHALATION) at 08:01

## 2018-06-22 RX ADMIN — PANTOPRAZOLE SODIUM 40 MG: 40 INJECTION, POWDER, FOR SOLUTION INTRAVENOUS at 02:31

## 2018-06-22 RX ADMIN — IPRATROPIUM BROMIDE AND ALBUTEROL SULFATE 1 AMPULE: .5; 3 SOLUTION RESPIRATORY (INHALATION) at 11:27

## 2018-06-22 RX ADMIN — POTASSIUM CHLORIDE 40 MEQ: 20 TABLET, EXTENDED RELEASE ORAL at 08:45

## 2018-06-22 RX ADMIN — SODIUM CHLORIDE 250 ML: 9 INJECTION, SOLUTION INTRAVENOUS at 23:55

## 2018-06-22 RX ADMIN — POTASSIUM CHLORIDE 40 MEQ: 20 TABLET, EXTENDED RELEASE ORAL at 17:38

## 2018-06-22 RX ADMIN — MAGNESIUM OXIDE TAB 400 MG (241.3 MG ELEMENTAL MG) 400 MG: 400 (241.3 MG) TAB at 20:38

## 2018-06-22 RX ADMIN — IPRATROPIUM BROMIDE AND ALBUTEROL SULFATE 1 AMPULE: .5; 3 SOLUTION RESPIRATORY (INHALATION) at 15:40

## 2018-06-22 RX ADMIN — Medication 10 ML: at 08:46

## 2018-06-22 RX ADMIN — SODIUM CHLORIDE TAB 1 GM 1 G: 1 TAB at 17:38

## 2018-06-22 RX ADMIN — MOMETASONE FUROATE AND FORMOTEROL FUMARATE DIHYDRATE 2 PUFF: 200; 5 AEROSOL RESPIRATORY (INHALATION) at 19:35

## 2018-06-22 RX ADMIN — LEVOFLOXACIN 750 MG: 500 TABLET, FILM COATED ORAL at 08:41

## 2018-06-22 RX ADMIN — ATORVASTATIN CALCIUM 10 MG: 10 TABLET, FILM COATED ORAL at 08:42

## 2018-06-22 RX ADMIN — SODIUM CHLORIDE 8 MG/HR: 9 INJECTION, SOLUTION INTRAVENOUS at 16:19

## 2018-06-22 RX ADMIN — CYANOCOBALAMIN TAB 1000 MCG 1000 MCG: 1000 TAB at 08:42

## 2018-06-22 RX ADMIN — GABAPENTIN 300 MG: 300 CAPSULE ORAL at 12:19

## 2018-06-22 RX ADMIN — SODIUM CHLORIDE 250 ML: 9 INJECTION, SOLUTION INTRAVENOUS at 12:19

## 2018-06-22 ASSESSMENT — PAIN SCALES - GENERAL: PAINLEVEL_OUTOF10: 0

## 2018-06-23 ENCOUNTER — ANESTHESIA (OUTPATIENT)
Dept: OPERATING ROOM | Age: 73
DRG: 552 | End: 2018-06-23
Payer: MEDICARE

## 2018-06-23 ENCOUNTER — ANESTHESIA EVENT (OUTPATIENT)
Dept: OPERATING ROOM | Age: 73
DRG: 552 | End: 2018-06-23
Payer: MEDICARE

## 2018-06-23 VITALS
RESPIRATION RATE: 14 BRPM | SYSTOLIC BLOOD PRESSURE: 147 MMHG | DIASTOLIC BLOOD PRESSURE: 70 MMHG | OXYGEN SATURATION: 100 %

## 2018-06-23 LAB
HCT VFR BLD CALC: 22.8 % (ref 36–46)
HCT VFR BLD CALC: 23.1 % (ref 36–46)
HCT VFR BLD CALC: 23.3 % (ref 36–46)
HCT VFR BLD CALC: 27.6 % (ref 36–46)
HEMOGLOBIN: 7.6 G/DL (ref 12–16)
HEMOGLOBIN: 7.6 G/DL (ref 12–16)
HEMOGLOBIN: 7.7 G/DL (ref 12–16)
HEMOGLOBIN: 9 G/DL (ref 12–16)
SODIUM BLD-SCNC: 125 MMOL/L (ref 135–144)
SODIUM BLD-SCNC: 130 MMOL/L (ref 135–144)

## 2018-06-23 PROCEDURE — 43239 EGD BIOPSY SINGLE/MULTIPLE: CPT | Performed by: INTERNAL MEDICINE

## 2018-06-23 PROCEDURE — 94640 AIRWAY INHALATION TREATMENT: CPT

## 2018-06-23 PROCEDURE — 88305 TISSUE EXAM BY PATHOLOGIST: CPT

## 2018-06-23 PROCEDURE — 3609012800 HC EGD DIAGNOSTIC ONLY: Performed by: INTERNAL MEDICINE

## 2018-06-23 PROCEDURE — 93923 UPR/LXTR ART STDY 3+ LVLS: CPT

## 2018-06-23 PROCEDURE — 2580000003 HC RX 258: Performed by: NURSE PRACTITIONER

## 2018-06-23 PROCEDURE — 6370000000 HC RX 637 (ALT 250 FOR IP): Performed by: NURSE PRACTITIONER

## 2018-06-23 PROCEDURE — 93880 EXTRACRANIAL BILAT STUDY: CPT

## 2018-06-23 PROCEDURE — 1200000000 HC SEMI PRIVATE

## 2018-06-23 PROCEDURE — 97535 SELF CARE MNGMENT TRAINING: CPT

## 2018-06-23 PROCEDURE — 2580000003 HC RX 258: Performed by: ANESTHESIOLOGY

## 2018-06-23 PROCEDURE — 0DB98ZX EXCISION OF DUODENUM, VIA NATURAL OR ARTIFICIAL OPENING ENDOSCOPIC, DIAGNOSTIC: ICD-10-PCS | Performed by: INTERNAL MEDICINE

## 2018-06-23 PROCEDURE — 2500000003 HC RX 250 WO HCPCS: Performed by: ANESTHESIOLOGY

## 2018-06-23 PROCEDURE — 97116 GAIT TRAINING THERAPY: CPT

## 2018-06-23 PROCEDURE — 85014 HEMATOCRIT: CPT

## 2018-06-23 PROCEDURE — 84295 ASSAY OF SERUM SODIUM: CPT

## 2018-06-23 PROCEDURE — 6360000002 HC RX W HCPCS: Performed by: ANESTHESIOLOGY

## 2018-06-23 PROCEDURE — 36415 COLL VENOUS BLD VENIPUNCTURE: CPT

## 2018-06-23 PROCEDURE — 7100000010 HC PHASE II RECOVERY - FIRST 15 MIN: Performed by: INTERNAL MEDICINE

## 2018-06-23 PROCEDURE — 97530 THERAPEUTIC ACTIVITIES: CPT

## 2018-06-23 PROCEDURE — 7100000011 HC PHASE II RECOVERY - ADDTL 15 MIN: Performed by: INTERNAL MEDICINE

## 2018-06-23 PROCEDURE — 88342 IMHCHEM/IMCYTCHM 1ST ANTB: CPT

## 2018-06-23 PROCEDURE — 3700000000 HC ANESTHESIA ATTENDED CARE: Performed by: INTERNAL MEDICINE

## 2018-06-23 PROCEDURE — 3700000001 HC ADD 15 MINUTES (ANESTHESIA): Performed by: INTERNAL MEDICINE

## 2018-06-23 PROCEDURE — 6370000000 HC RX 637 (ALT 250 FOR IP): Performed by: INTERNAL MEDICINE

## 2018-06-23 PROCEDURE — 6360000002 HC RX W HCPCS: Performed by: NURSE PRACTITIONER

## 2018-06-23 PROCEDURE — 85018 HEMOGLOBIN: CPT

## 2018-06-23 PROCEDURE — C9113 INJ PANTOPRAZOLE SODIUM, VIA: HCPCS | Performed by: NURSE PRACTITIONER

## 2018-06-23 PROCEDURE — 99232 SBSQ HOSP IP/OBS MODERATE 35: CPT | Performed by: INTERNAL MEDICINE

## 2018-06-23 PROCEDURE — 0DB68ZX EXCISION OF STOMACH, VIA NATURAL OR ARTIFICIAL OPENING ENDOSCOPIC, DIAGNOSTIC: ICD-10-PCS | Performed by: INTERNAL MEDICINE

## 2018-06-23 RX ORDER — LIDOCAINE HYDROCHLORIDE 20 MG/ML
INJECTION, SOLUTION INFILTRATION; PERINEURAL PRN
Status: DISCONTINUED | OUTPATIENT
Start: 2018-06-23 | End: 2018-06-23 | Stop reason: SDUPTHER

## 2018-06-23 RX ORDER — ONDANSETRON 2 MG/ML
4 INJECTION INTRAMUSCULAR; INTRAVENOUS
Status: DISCONTINUED | OUTPATIENT
Start: 2018-06-23 | End: 2018-06-23 | Stop reason: HOSPADM

## 2018-06-23 RX ORDER — PANTOPRAZOLE SODIUM 40 MG/1
40 TABLET, DELAYED RELEASE ORAL
Status: DISCONTINUED | OUTPATIENT
Start: 2018-06-24 | End: 2018-06-24 | Stop reason: HOSPADM

## 2018-06-23 RX ORDER — PROPOFOL 10 MG/ML
INJECTION, EMULSION INTRAVENOUS PRN
Status: DISCONTINUED | OUTPATIENT
Start: 2018-06-23 | End: 2018-06-23 | Stop reason: SDUPTHER

## 2018-06-23 RX ORDER — SODIUM CHLORIDE 9 MG/ML
INJECTION, SOLUTION INTRAVENOUS CONTINUOUS PRN
Status: DISCONTINUED | OUTPATIENT
Start: 2018-06-23 | End: 2018-06-23 | Stop reason: SDUPTHER

## 2018-06-23 RX ADMIN — IPRATROPIUM BROMIDE AND ALBUTEROL SULFATE 1 AMPULE: .5; 3 SOLUTION RESPIRATORY (INHALATION) at 16:12

## 2018-06-23 RX ADMIN — IPRATROPIUM BROMIDE AND ALBUTEROL SULFATE 1 AMPULE: .5; 3 SOLUTION RESPIRATORY (INHALATION) at 19:34

## 2018-06-23 RX ADMIN — SODIUM CHLORIDE TAB 1 GM 1 G: 1 TAB at 12:23

## 2018-06-23 RX ADMIN — IPRATROPIUM BROMIDE AND ALBUTEROL SULFATE 1 AMPULE: .5; 3 SOLUTION RESPIRATORY (INHALATION) at 07:36

## 2018-06-23 RX ADMIN — SODIUM CHLORIDE 8 MG/HR: 9 INJECTION, SOLUTION INTRAVENOUS at 15:42

## 2018-06-23 RX ADMIN — ATORVASTATIN CALCIUM 10 MG: 10 TABLET, FILM COATED ORAL at 12:23

## 2018-06-23 RX ADMIN — SODIUM CHLORIDE 8 MG/HR: 9 INJECTION, SOLUTION INTRAVENOUS at 01:24

## 2018-06-23 RX ADMIN — LEVOFLOXACIN 750 MG: 500 TABLET, FILM COATED ORAL at 12:22

## 2018-06-23 RX ADMIN — MOMETASONE FUROATE AND FORMOTEROL FUMARATE DIHYDRATE 2 PUFF: 200; 5 AEROSOL RESPIRATORY (INHALATION) at 07:37

## 2018-06-23 RX ADMIN — Medication 100 MG: at 12:23

## 2018-06-23 RX ADMIN — GABAPENTIN 300 MG: 300 CAPSULE ORAL at 20:54

## 2018-06-23 RX ADMIN — SODIUM CHLORIDE: 9 INJECTION, SOLUTION INTRAVENOUS at 11:05

## 2018-06-23 RX ADMIN — ISOSORBIDE MONONITRATE 30 MG: 30 TABLET ORAL at 12:22

## 2018-06-23 RX ADMIN — ASPIRIN 81 MG: 81 TABLET, COATED ORAL at 12:22

## 2018-06-23 RX ADMIN — MAGNESIUM OXIDE TAB 400 MG (241.3 MG ELEMENTAL MG) 400 MG: 400 (241.3 MG) TAB at 20:54

## 2018-06-23 RX ADMIN — LIDOCAINE HYDROCHLORIDE 5 ML: 20 INJECTION, SOLUTION INFILTRATION; PERINEURAL at 11:05

## 2018-06-23 RX ADMIN — MULTIVITAMIN TABLET 1 TABLET: TABLET at 12:23

## 2018-06-23 RX ADMIN — SODIUM CHLORIDE TAB 1 GM 1 G: 1 TAB at 17:02

## 2018-06-23 RX ADMIN — CYANOCOBALAMIN TAB 1000 MCG 1000 MCG: 1000 TAB at 12:22

## 2018-06-23 RX ADMIN — MOMETASONE FUROATE AND FORMOTEROL FUMARATE DIHYDRATE 2 PUFF: 200; 5 AEROSOL RESPIRATORY (INHALATION) at 19:35

## 2018-06-23 RX ADMIN — PROPOFOL 70 MG: 10 INJECTION, EMULSION INTRAVENOUS at 11:05

## 2018-06-23 RX ADMIN — CITALOPRAM HYDROBROMIDE 40 MG: 20 TABLET ORAL at 12:24

## 2018-06-23 RX ADMIN — MAGNESIUM OXIDE TAB 400 MG (241.3 MG ELEMENTAL MG) 400 MG: 400 (241.3 MG) TAB at 12:23

## 2018-06-23 RX ADMIN — FOLIC ACID 1 MG: 1 TABLET ORAL at 12:23

## 2018-06-23 RX ADMIN — GABAPENTIN 300 MG: 300 CAPSULE ORAL at 12:22

## 2018-06-23 ASSESSMENT — PAIN SCALES - GENERAL
PAINLEVEL_OUTOF10: 0

## 2018-06-23 ASSESSMENT — PULMONARY FUNCTION TESTS
PIF_VALUE: 0
PIF_VALUE: 1
PIF_VALUE: 1
PIF_VALUE: 0
PIF_VALUE: 0
PIF_VALUE: 1
PIF_VALUE: 0
PIF_VALUE: 1
PIF_VALUE: 0
PIF_VALUE: 1
PIF_VALUE: 0

## 2018-06-23 ASSESSMENT — ENCOUNTER SYMPTOMS: SHORTNESS OF BREATH: 0

## 2018-06-24 VITALS
HEIGHT: 63 IN | WEIGHT: 119.5 LBS | SYSTOLIC BLOOD PRESSURE: 138 MMHG | OXYGEN SATURATION: 98 % | TEMPERATURE: 98.2 F | RESPIRATION RATE: 18 BRPM | HEART RATE: 86 BPM | DIASTOLIC BLOOD PRESSURE: 72 MMHG | BODY MASS INDEX: 21.17 KG/M2

## 2018-06-24 LAB
HCT VFR BLD CALC: 22.8 % (ref 36–46)
HCT VFR BLD CALC: 23.3 % (ref 36–46)
HEMOGLOBIN: 7.5 G/DL (ref 12–16)
HEMOGLOBIN: 7.9 G/DL (ref 12–16)
TSH SERPL DL<=0.05 MIU/L-ACNC: 2.2 MIU/L (ref 0.3–5)

## 2018-06-24 PROCEDURE — 6370000000 HC RX 637 (ALT 250 FOR IP): Performed by: INTERNAL MEDICINE

## 2018-06-24 PROCEDURE — 36415 COLL VENOUS BLD VENIPUNCTURE: CPT

## 2018-06-24 PROCEDURE — 84443 ASSAY THYROID STIM HORMONE: CPT

## 2018-06-24 PROCEDURE — 85018 HEMOGLOBIN: CPT

## 2018-06-24 PROCEDURE — 85014 HEMATOCRIT: CPT

## 2018-06-24 PROCEDURE — 6370000000 HC RX 637 (ALT 250 FOR IP): Performed by: NURSE PRACTITIONER

## 2018-06-24 PROCEDURE — 99239 HOSP IP/OBS DSCHRG MGMT >30: CPT | Performed by: FAMILY MEDICINE

## 2018-06-24 RX ORDER — NICOTINE 21 MG/24HR
1 PATCH, TRANSDERMAL 24 HOURS TRANSDERMAL EVERY 24 HOURS
Qty: 30 PATCH | Refills: 3 | Status: SHIPPED | OUTPATIENT
Start: 2018-06-24 | End: 2019-06-24

## 2018-06-24 RX ORDER — FOLIC ACID 1 MG/1
1 TABLET ORAL 4 TIMES DAILY
Qty: 120 TABLET | Refills: 4 | Status: SHIPPED | OUTPATIENT
Start: 2018-06-24

## 2018-06-24 RX ORDER — PANTOPRAZOLE SODIUM 40 MG/1
40 TABLET, DELAYED RELEASE ORAL
Qty: 30 TABLET | Refills: 3 | Status: SHIPPED | OUTPATIENT
Start: 2018-06-25

## 2018-06-24 RX ORDER — SODIUM CHLORIDE 1000 MG
1 TABLET, SOLUBLE MISCELLANEOUS 3 TIMES DAILY
Qty: 42 TABLET | Refills: 0 | Status: ON HOLD | OUTPATIENT
Start: 2018-06-24 | End: 2018-12-19 | Stop reason: SDUPTHER

## 2018-06-24 RX ORDER — LANOLIN ALCOHOL/MO/W.PET/CERES
100 CREAM (GRAM) TOPICAL DAILY
Qty: 30 TABLET | Refills: 3 | Status: SHIPPED | OUTPATIENT
Start: 2018-06-25

## 2018-06-24 RX ADMIN — ISOSORBIDE MONONITRATE 30 MG: 30 TABLET ORAL at 08:40

## 2018-06-24 RX ADMIN — SODIUM CHLORIDE TAB 1 GM 1 G: 1 TAB at 08:40

## 2018-06-24 RX ADMIN — FOLIC ACID 1 MG: 1 TABLET ORAL at 08:40

## 2018-06-24 RX ADMIN — ATORVASTATIN CALCIUM 10 MG: 10 TABLET, FILM COATED ORAL at 08:40

## 2018-06-24 RX ADMIN — ASPIRIN 81 MG: 81 TABLET, COATED ORAL at 08:41

## 2018-06-24 RX ADMIN — CYANOCOBALAMIN TAB 1000 MCG 1000 MCG: 1000 TAB at 08:41

## 2018-06-24 RX ADMIN — GABAPENTIN 300 MG: 300 CAPSULE ORAL at 08:41

## 2018-06-24 RX ADMIN — PANTOPRAZOLE SODIUM 40 MG: 40 TABLET, DELAYED RELEASE ORAL at 08:40

## 2018-06-24 RX ADMIN — LEVOFLOXACIN 750 MG: 500 TABLET, FILM COATED ORAL at 08:41

## 2018-06-24 RX ADMIN — MULTIVITAMIN TABLET 1 TABLET: TABLET at 08:41

## 2018-06-24 RX ADMIN — Medication 100 MG: at 08:41

## 2018-06-24 RX ADMIN — CITALOPRAM HYDROBROMIDE 40 MG: 20 TABLET ORAL at 08:40

## 2018-06-24 ASSESSMENT — PAIN SCALES - GENERAL
PAINLEVEL_OUTOF10: 0

## 2018-06-24 ASSESSMENT — ENCOUNTER SYMPTOMS
SHORTNESS OF BREATH: 0
NAUSEA: 0
CONSTIPATION: 0
VOMITING: 0
ABDOMINAL PAIN: 0
VOICE CHANGE: 0
DIARRHEA: 0
SINUS PRESSURE: 0
WHEEZING: 0
COUGH: 0
BLOOD IN STOOL: 0
SORE THROAT: 0

## 2018-06-26 LAB
ABO/RH: NORMAL
ANTIBODY SCREEN: NEGATIVE
ARM BAND NUMBER: NORMAL
BLD PROD TYP BPU: NORMAL
BLD PROD TYP BPU: NORMAL
CROSSMATCH RESULT: NORMAL
CROSSMATCH RESULT: NORMAL
DISPENSE STATUS BLOOD BANK: NORMAL
DISPENSE STATUS BLOOD BANK: NORMAL
EXPIRATION DATE: NORMAL
SURGICAL PATHOLOGY REPORT: NORMAL
TRANSFUSION STATUS: NORMAL
TRANSFUSION STATUS: NORMAL
UNIT DIVISION: 0
UNIT DIVISION: 0
UNIT NUMBER: NORMAL
UNIT NUMBER: NORMAL

## 2018-12-15 ENCOUNTER — APPOINTMENT (OUTPATIENT)
Dept: GENERAL RADIOLOGY | Age: 73
DRG: 643 | End: 2018-12-15
Payer: MEDICARE

## 2018-12-15 ENCOUNTER — APPOINTMENT (OUTPATIENT)
Dept: CT IMAGING | Age: 73
DRG: 643 | End: 2018-12-15
Payer: MEDICARE

## 2018-12-15 ENCOUNTER — HOSPITAL ENCOUNTER (INPATIENT)
Age: 73
LOS: 5 days | Discharge: NURSING, REHAB OR LONG-TERM CARE | DRG: 643 | End: 2018-12-21
Attending: EMERGENCY MEDICINE | Admitting: INTERNAL MEDICINE
Payer: MEDICARE

## 2018-12-15 DIAGNOSIS — E87.1 HYPONATREMIA: Primary | ICD-10-CM

## 2018-12-15 DIAGNOSIS — F41.9 ANXIETY: ICD-10-CM

## 2018-12-15 LAB
ABSOLUTE EOS #: 0.1 K/UL (ref 0–0.4)
ABSOLUTE IMMATURE GRANULOCYTE: ABNORMAL K/UL (ref 0–0.3)
ABSOLUTE LYMPH #: 1.1 K/UL (ref 1–4.8)
ABSOLUTE MONO #: 0.8 K/UL (ref 0.2–0.8)
ALBUMIN SERPL-MCNC: 3.9 G/DL (ref 3.5–5.2)
ALBUMIN/GLOBULIN RATIO: NORMAL (ref 1–2.5)
ALP BLD-CCNC: 74 U/L (ref 35–104)
ALT SERPL-CCNC: 12 U/L (ref 5–33)
ANION GAP SERPL CALCULATED.3IONS-SCNC: 25 MMOL/L (ref 9–17)
AST SERPL-CCNC: 22 U/L
BASOPHILS # BLD: 0 % (ref 0–2)
BASOPHILS ABSOLUTE: 0 K/UL (ref 0–0.2)
BILIRUB SERPL-MCNC: 0.81 MG/DL (ref 0.3–1.2)
BILIRUBIN DIRECT: 0.28 MG/DL
BILIRUBIN, INDIRECT: 0.53 MG/DL (ref 0–1)
BUN BLDV-MCNC: 14 MG/DL (ref 8–23)
BUN/CREAT BLD: 25 (ref 9–20)
CALCIUM SERPL-MCNC: 9.6 MG/DL (ref 8.6–10.4)
CHLORIDE BLD-SCNC: 80 MMOL/L (ref 98–107)
CO2: 18 MMOL/L (ref 20–31)
CREAT SERPL-MCNC: 0.56 MG/DL (ref 0.5–0.9)
DIFFERENTIAL TYPE: ABNORMAL
EKG ATRIAL RATE: 113 BPM
EKG P AXIS: 91 DEGREES
EKG P-R INTERVAL: 138 MS
EKG Q-T INTERVAL: 322 MS
EKG QRS DURATION: 72 MS
EKG QTC CALCULATION (BAZETT): 433 MS
EKG R AXIS: 78 DEGREES
EKG T AXIS: 78 DEGREES
EKG VENTRICULAR RATE: 109 BPM
EOSINOPHILS RELATIVE PERCENT: 1 % (ref 1–4)
GFR AFRICAN AMERICAN: >60 ML/MIN
GFR NON-AFRICAN AMERICAN: >60 ML/MIN
GFR SERPL CREATININE-BSD FRML MDRD: ABNORMAL ML/MIN/{1.73_M2}
GFR SERPL CREATININE-BSD FRML MDRD: ABNORMAL ML/MIN/{1.73_M2}
GLOBULIN: NORMAL G/DL (ref 1.5–3.8)
GLUCOSE BLD-MCNC: 231 MG/DL (ref 70–99)
HCT VFR BLD CALC: 29 % (ref 36–46)
HEMOGLOBIN: 10.1 G/DL (ref 12–16)
IMMATURE GRANULOCYTES: ABNORMAL %
LIPASE: 63 U/L (ref 13–60)
LYMPHOCYTES # BLD: 13 % (ref 24–44)
MCH RBC QN AUTO: 31.9 PG (ref 26–34)
MCHC RBC AUTO-ENTMCNC: 34.8 G/DL (ref 31–37)
MCV RBC AUTO: 91.7 FL (ref 80–100)
MONOCYTES # BLD: 10 % (ref 1–7)
MYOGLOBIN: 63 NG/ML (ref 25–58)
NRBC AUTOMATED: ABNORMAL PER 100 WBC
PDW BLD-RTO: 13.8 % (ref 11.5–14.5)
PLATELET # BLD: 163 K/UL (ref 130–400)
PLATELET ESTIMATE: ABNORMAL
PMV BLD AUTO: 9.4 FL (ref 6–12)
POTASSIUM SERPL-SCNC: 2.9 MMOL/L (ref 3.7–5.3)
RBC # BLD: 3.16 M/UL (ref 4–5.2)
RBC # BLD: ABNORMAL 10*6/UL
SEG NEUTROPHILS: 76 % (ref 36–66)
SEGMENTED NEUTROPHILS ABSOLUTE COUNT: 6.5 K/UL (ref 1.8–7.7)
SODIUM BLD-SCNC: 123 MMOL/L (ref 135–144)
TOTAL PROTEIN: 6.8 G/DL (ref 6.4–8.3)
TROPONIN INTERP: ABNORMAL
TROPONIN T: <0.03 NG/ML
WBC # BLD: 8.7 K/UL (ref 3.5–11)
WBC # BLD: ABNORMAL 10*3/UL

## 2018-12-15 PROCEDURE — 71045 X-RAY EXAM CHEST 1 VIEW: CPT

## 2018-12-15 PROCEDURE — 80307 DRUG TEST PRSMV CHEM ANLYZR: CPT

## 2018-12-15 PROCEDURE — 83874 ASSAY OF MYOGLOBIN: CPT

## 2018-12-15 PROCEDURE — G0480 DRUG TEST DEF 1-7 CLASSES: HCPCS

## 2018-12-15 PROCEDURE — 6370000000 HC RX 637 (ALT 250 FOR IP): Performed by: NURSE PRACTITIONER

## 2018-12-15 PROCEDURE — 85025 COMPLETE CBC W/AUTO DIFF WBC: CPT

## 2018-12-15 PROCEDURE — 84484 ASSAY OF TROPONIN QUANT: CPT

## 2018-12-15 PROCEDURE — 96360 HYDRATION IV INFUSION INIT: CPT

## 2018-12-15 PROCEDURE — 81001 URINALYSIS AUTO W/SCOPE: CPT

## 2018-12-15 PROCEDURE — 80076 HEPATIC FUNCTION PANEL: CPT

## 2018-12-15 PROCEDURE — 99285 EMERGENCY DEPT VISIT HI MDM: CPT

## 2018-12-15 PROCEDURE — 2580000003 HC RX 258: Performed by: NURSE PRACTITIONER

## 2018-12-15 PROCEDURE — 93005 ELECTROCARDIOGRAM TRACING: CPT

## 2018-12-15 PROCEDURE — 80048 BASIC METABOLIC PNL TOTAL CA: CPT

## 2018-12-15 PROCEDURE — 70450 CT HEAD/BRAIN W/O DYE: CPT

## 2018-12-15 PROCEDURE — 83690 ASSAY OF LIPASE: CPT

## 2018-12-15 PROCEDURE — 87086 URINE CULTURE/COLONY COUNT: CPT

## 2018-12-15 RX ORDER — SODIUM CHLORIDE 9 MG/ML
INJECTION, SOLUTION INTRAVENOUS CONTINUOUS
Status: DISCONTINUED | OUTPATIENT
Start: 2018-12-15 | End: 2018-12-16

## 2018-12-15 RX ORDER — 0.9 % SODIUM CHLORIDE 0.9 %
1000 INTRAVENOUS SOLUTION INTRAVENOUS ONCE
Status: COMPLETED | OUTPATIENT
Start: 2018-12-15 | End: 2018-12-15

## 2018-12-15 RX ORDER — POTASSIUM BICARBONATE 25 MEQ/1
50 TABLET, EFFERVESCENT ORAL ONCE
Status: COMPLETED | OUTPATIENT
Start: 2018-12-15 | End: 2018-12-15

## 2018-12-15 RX ADMIN — POTASSIUM BICARBONATE 50 MEQ: 25 TABLET, EFFERVESCENT ORAL at 23:21

## 2018-12-15 RX ADMIN — SODIUM CHLORIDE 1000 ML: 9 INJECTION, SOLUTION INTRAVENOUS at 22:27

## 2018-12-15 RX ADMIN — SODIUM CHLORIDE: 9 INJECTION, SOLUTION INTRAVENOUS at 23:30

## 2018-12-15 ASSESSMENT — ENCOUNTER SYMPTOMS
NAUSEA: 0
SORE THROAT: 0
BLOOD IN STOOL: 1
PHOTOPHOBIA: 0
BACK PAIN: 0
RHINORRHEA: 0
SHORTNESS OF BREATH: 0
DIARRHEA: 0
COUGH: 0
CONSTIPATION: 1
VOMITING: 0
CHEST TIGHTNESS: 0

## 2018-12-15 ASSESSMENT — PAIN DESCRIPTION - PAIN TYPE: TYPE: ACUTE PAIN

## 2018-12-15 ASSESSMENT — PAIN DESCRIPTION - FREQUENCY: FREQUENCY: INTERMITTENT

## 2018-12-15 ASSESSMENT — PAIN DESCRIPTION - DESCRIPTORS: DESCRIPTORS: CRAMPING

## 2018-12-15 ASSESSMENT — PAIN DESCRIPTION - LOCATION: LOCATION: ABDOMEN

## 2018-12-15 ASSESSMENT — PAIN SCALES - GENERAL: PAINLEVEL_OUTOF10: 3

## 2018-12-15 ASSESSMENT — PAIN DESCRIPTION - PROGRESSION: CLINICAL_PROGRESSION: NOT CHANGED

## 2018-12-16 ENCOUNTER — APPOINTMENT (OUTPATIENT)
Dept: CT IMAGING | Age: 73
DRG: 643 | End: 2018-12-16
Payer: MEDICARE

## 2018-12-16 PROBLEM — R13.19 ESOPHAGEAL DYSPHAGIA: Status: ACTIVE | Noted: 2018-12-16

## 2018-12-16 PROBLEM — E87.6 HYPOKALEMIA: Status: ACTIVE | Noted: 2018-12-16

## 2018-12-16 PROBLEM — E87.1 HYPONATREMIA: Status: ACTIVE | Noted: 2018-12-16

## 2018-12-16 PROBLEM — Z91.81 HISTORY OF FALL: Status: ACTIVE | Noted: 2018-12-16

## 2018-12-16 PROBLEM — R63.4 UNINTENTIONAL WEIGHT LOSS: Status: ACTIVE | Noted: 2018-12-16

## 2018-12-16 PROBLEM — D64.9 NORMOCYTIC NORMOCHROMIC ANEMIA: Status: ACTIVE | Noted: 2018-12-16

## 2018-12-16 PROBLEM — R53.1 GENERALIZED WEAKNESS: Status: ACTIVE | Noted: 2018-12-16

## 2018-12-16 LAB
-: NORMAL
ABSOLUTE RETIC #: 0.03 M/UL (ref 0.02–0.1)
ACETAMINOPHEN LEVEL: <10 UG/ML (ref 10–30)
ALBUMIN SERPL-MCNC: 3.2 G/DL (ref 3.5–5.2)
ALBUMIN SERPL-MCNC: 3.6 G/DL (ref 3.5–5.2)
ALBUMIN/GLOBULIN RATIO: ABNORMAL (ref 1–2.5)
ALBUMIN/GLOBULIN RATIO: ABNORMAL (ref 1–2.5)
ALP BLD-CCNC: 62 U/L (ref 35–104)
ALP BLD-CCNC: 68 U/L (ref 35–104)
ALT SERPL-CCNC: 11 U/L (ref 5–33)
ALT SERPL-CCNC: 9 U/L (ref 5–33)
AMMONIA: 24 UMOL/L (ref 11–51)
AMORPHOUS: NORMAL
AMPHETAMINE SCREEN URINE: NEGATIVE
ANION GAP SERPL CALCULATED.3IONS-SCNC: 12 MMOL/L (ref 9–17)
ANION GAP SERPL CALCULATED.3IONS-SCNC: 15 MMOL/L (ref 9–17)
ANION GAP SERPL CALCULATED.3IONS-SCNC: 16 MMOL/L (ref 9–17)
ANION GAP SERPL CALCULATED.3IONS-SCNC: 19 MMOL/L (ref 9–17)
AST SERPL-CCNC: 19 U/L
AST SERPL-CCNC: 21 U/L
BACTERIA: NORMAL
BARBITURATE SCREEN URINE: NEGATIVE
BENZODIAZEPINE SCREEN, URINE: NEGATIVE
BILIRUB SERPL-MCNC: 0.55 MG/DL (ref 0.3–1.2)
BILIRUB SERPL-MCNC: 0.74 MG/DL (ref 0.3–1.2)
BILIRUBIN DIRECT: 0.27 MG/DL
BILIRUBIN DIRECT: 0.31 MG/DL
BILIRUBIN URINE: ABNORMAL
BILIRUBIN, INDIRECT: 0.28 MG/DL (ref 0–1)
BILIRUBIN, INDIRECT: 0.43 MG/DL (ref 0–1)
BUN BLDV-MCNC: 13 MG/DL (ref 8–23)
BUN/CREAT BLD: 30 (ref 9–20)
BUPRENORPHINE URINE: NORMAL
CALCIUM IONIZED: 1.18 MMOL/L (ref 1.13–1.33)
CALCIUM SERPL-MCNC: 8.6 MG/DL (ref 8.6–10.4)
CANNABINOID SCREEN URINE: NEGATIVE
CASTS UA: NORMAL /LPF
CHLORIDE BLD-SCNC: 87 MMOL/L (ref 98–107)
CHLORIDE BLD-SCNC: 90 MMOL/L (ref 98–107)
CHLORIDE BLD-SCNC: 90 MMOL/L (ref 98–107)
CHLORIDE BLD-SCNC: 91 MMOL/L (ref 98–107)
CO2: 19 MMOL/L (ref 20–31)
CO2: 20 MMOL/L (ref 20–31)
COCAINE METABOLITE, URINE: NEGATIVE
COLOR: YELLOW
COMMENT UA: ABNORMAL
CORTISOL COLLECTION INFO: ABNORMAL
CORTISOL: 22.9 UG/DL (ref 2.7–18.4)
CREAT SERPL-MCNC: 0.44 MG/DL (ref 0.5–0.9)
CRYSTALS, UA: NORMAL /HPF
EPITHELIAL CELLS UA: NORMAL /HPF (ref 0–5)
ETHANOL PERCENT: <0.01 %
ETHANOL: <10 MG/DL
FERRITIN: 1870 UG/L (ref 13–150)
GFR AFRICAN AMERICAN: >60 ML/MIN
GFR NON-AFRICAN AMERICAN: >60 ML/MIN
GFR SERPL CREATININE-BSD FRML MDRD: ABNORMAL ML/MIN/{1.73_M2}
GFR SERPL CREATININE-BSD FRML MDRD: ABNORMAL ML/MIN/{1.73_M2}
GLOBULIN: ABNORMAL G/DL (ref 1.5–3.8)
GLOBULIN: ABNORMAL G/DL (ref 1.5–3.8)
GLUCOSE BLD-MCNC: 212 MG/DL (ref 65–105)
GLUCOSE BLD-MCNC: 212 MG/DL (ref 70–99)
GLUCOSE BLD-MCNC: 220 MG/DL (ref 65–105)
GLUCOSE BLD-MCNC: 309 MG/DL (ref 65–105)
GLUCOSE URINE: NEGATIVE
IMMATURE RETIC FRACT: NORMAL %
IRON SATURATION: ABNORMAL % (ref 20–55)
IRON: 148 UG/DL (ref 37–145)
KETONES, URINE: ABNORMAL
LEUKOCYTE ESTERASE, URINE: ABNORMAL
MAGNESIUM: 1.4 MG/DL (ref 1.6–2.6)
MDMA URINE: NORMAL
METHADONE SCREEN, URINE: NEGATIVE
METHAMPHETAMINE, URINE: NORMAL
MUCUS: NORMAL
MYOGLOBIN: 37 NG/ML (ref 25–58)
MYOGLOBIN: 45 NG/ML (ref 25–58)
NITRITE, URINE: NEGATIVE
OPIATES, URINE: NEGATIVE
OSMOLALITY URINE: 537 MOSM/KG (ref 300–1170)
OTHER OBSERVATIONS UA: NORMAL
OXYCODONE SCREEN URINE: NEGATIVE
PH UA: 6.5 (ref 5–8)
PHENCYCLIDINE, URINE: NEGATIVE
POTASSIUM SERPL-SCNC: 3.4 MMOL/L (ref 3.7–5.3)
POTASSIUM SERPL-SCNC: 3.4 MMOL/L (ref 3.7–5.3)
POTASSIUM SERPL-SCNC: 3.6 MMOL/L (ref 3.7–5.3)
POTASSIUM SERPL-SCNC: 4 MMOL/L (ref 3.7–5.3)
PROPOXYPHENE, URINE: NORMAL
PROTEIN UA: ABNORMAL
RBC UA: NORMAL /HPF (ref 0–2)
RENAL EPITHELIAL, UA: NORMAL /HPF
RETIC %: 1 % (ref 0.5–2)
RETIC HEMOGLOBIN: NORMAL PG (ref 28.2–35.7)
SALICYLATE LEVEL: <1 MG/DL (ref 3–10)
SERUM OSMOLALITY: 265 MOSM/KG (ref 282–298)
SODIUM BLD-SCNC: 123 MMOL/L (ref 135–144)
SODIUM BLD-SCNC: 124 MMOL/L (ref 135–144)
SODIUM BLD-SCNC: 126 MMOL/L (ref 135–144)
SODIUM BLD-SCNC: 126 MMOL/L (ref 135–144)
SPECIFIC GRAVITY UA: 1.01 (ref 1–1.03)
TEST INFORMATION: NORMAL
THYROXINE, FREE: 1.46 NG/DL (ref 0.93–1.7)
TOTAL IRON BINDING CAPACITY: ABNORMAL UG/DL (ref 250–450)
TOTAL PROTEIN: 5.2 G/DL (ref 6.4–8.3)
TOTAL PROTEIN: 6.1 G/DL (ref 6.4–8.3)
TOXIC TRICYCLIC SC,BLOOD: NEGATIVE
TRICHOMONAS: NORMAL
TRICYCLIC ANTIDEPRESSANTS, UR: NORMAL
TROPONIN INTERP: NORMAL
TROPONIN INTERP: NORMAL
TROPONIN T: <0.03 NG/ML
TROPONIN T: <0.03 NG/ML
TSH SERPL DL<=0.05 MIU/L-ACNC: 0.82 MIU/L (ref 0.3–5)
TSH SERPL DL<=0.05 MIU/L-ACNC: 0.99 MIU/L (ref 0.3–5)
TURBIDITY: CLEAR
UNSATURATED IRON BINDING CAPACITY: <17 UG/DL (ref 112–347)
URINE HGB: NEGATIVE
UROBILINOGEN, URINE: NORMAL
WBC UA: NORMAL /HPF (ref 0–5)
YEAST: NORMAL

## 2018-12-16 PROCEDURE — 82140 ASSAY OF AMMONIA: CPT

## 2018-12-16 PROCEDURE — 97161 PT EVAL LOW COMPLEX 20 MIN: CPT

## 2018-12-16 PROCEDURE — 71260 CT THORAX DX C+: CPT

## 2018-12-16 PROCEDURE — 82533 TOTAL CORTISOL: CPT

## 2018-12-16 PROCEDURE — 99222 1ST HOSP IP/OBS MODERATE 55: CPT | Performed by: INTERNAL MEDICINE

## 2018-12-16 PROCEDURE — 6360000004 HC RX CONTRAST MEDICATION: Performed by: INTERNAL MEDICINE

## 2018-12-16 PROCEDURE — 80051 ELECTROLYTE PANEL: CPT

## 2018-12-16 PROCEDURE — 51798 US URINE CAPACITY MEASURE: CPT

## 2018-12-16 PROCEDURE — G8979 MOBILITY GOAL STATUS: HCPCS

## 2018-12-16 PROCEDURE — 84484 ASSAY OF TROPONIN QUANT: CPT

## 2018-12-16 PROCEDURE — 97530 THERAPEUTIC ACTIVITIES: CPT

## 2018-12-16 PROCEDURE — 6360000002 HC RX W HCPCS: Performed by: INTERNAL MEDICINE

## 2018-12-16 PROCEDURE — 83930 ASSAY OF BLOOD OSMOLALITY: CPT

## 2018-12-16 PROCEDURE — 85045 AUTOMATED RETICULOCYTE COUNT: CPT

## 2018-12-16 PROCEDURE — 80048 BASIC METABOLIC PNL TOTAL CA: CPT

## 2018-12-16 PROCEDURE — 83036 HEMOGLOBIN GLYCOSYLATED A1C: CPT

## 2018-12-16 PROCEDURE — 83874 ASSAY OF MYOGLOBIN: CPT

## 2018-12-16 PROCEDURE — 2580000003 HC RX 258: Performed by: INTERNAL MEDICINE

## 2018-12-16 PROCEDURE — 6370000000 HC RX 637 (ALT 250 FOR IP): Performed by: INTERNAL MEDICINE

## 2018-12-16 PROCEDURE — 84439 ASSAY OF FREE THYROXINE: CPT

## 2018-12-16 PROCEDURE — 82728 ASSAY OF FERRITIN: CPT

## 2018-12-16 PROCEDURE — 2060000000 HC ICU INTERMEDIATE R&B

## 2018-12-16 PROCEDURE — 36415 COLL VENOUS BLD VENIPUNCTURE: CPT

## 2018-12-16 PROCEDURE — 83540 ASSAY OF IRON: CPT

## 2018-12-16 PROCEDURE — 80076 HEPATIC FUNCTION PANEL: CPT

## 2018-12-16 PROCEDURE — G8980 MOBILITY D/C STATUS: HCPCS

## 2018-12-16 PROCEDURE — 82330 ASSAY OF CALCIUM: CPT

## 2018-12-16 PROCEDURE — 83735 ASSAY OF MAGNESIUM: CPT

## 2018-12-16 PROCEDURE — 82947 ASSAY GLUCOSE BLOOD QUANT: CPT

## 2018-12-16 PROCEDURE — 84443 ASSAY THYROID STIM HORMONE: CPT

## 2018-12-16 PROCEDURE — 83550 IRON BINDING TEST: CPT

## 2018-12-16 PROCEDURE — 83935 ASSAY OF URINE OSMOLALITY: CPT

## 2018-12-16 RX ORDER — ASPIRIN 81 MG/1
81 TABLET ORAL DAILY
Status: DISCONTINUED | OUTPATIENT
Start: 2018-12-16 | End: 2018-12-21 | Stop reason: HOSPADM

## 2018-12-16 RX ORDER — ISOSORBIDE MONONITRATE 30 MG/1
30 TABLET, EXTENDED RELEASE ORAL DAILY
Status: DISCONTINUED | OUTPATIENT
Start: 2018-12-16 | End: 2018-12-21 | Stop reason: HOSPADM

## 2018-12-16 RX ORDER — NICOTINE 21 MG/24HR
1 PATCH, TRANSDERMAL 24 HOURS TRANSDERMAL EVERY 24 HOURS
Status: DISCONTINUED | OUTPATIENT
Start: 2018-12-16 | End: 2018-12-21 | Stop reason: HOSPADM

## 2018-12-16 RX ORDER — LOSARTAN POTASSIUM 25 MG/1
25 TABLET ORAL DAILY
Status: DISCONTINUED | OUTPATIENT
Start: 2018-12-16 | End: 2018-12-21 | Stop reason: HOSPADM

## 2018-12-16 RX ORDER — METOPROLOL TARTRATE 50 MG/1
50 TABLET, FILM COATED ORAL 2 TIMES DAILY
Status: DISCONTINUED | OUTPATIENT
Start: 2018-12-16 | End: 2018-12-21 | Stop reason: HOSPADM

## 2018-12-16 RX ORDER — 0.9 % SODIUM CHLORIDE 0.9 %
50 INTRAVENOUS SOLUTION INTRAVENOUS ONCE
Status: COMPLETED | OUTPATIENT
Start: 2018-12-17 | End: 2018-12-16

## 2018-12-16 RX ORDER — SODIUM CHLORIDE 0.9 % (FLUSH) 0.9 %
10 SYRINGE (ML) INJECTION PRN
Status: DISCONTINUED | OUTPATIENT
Start: 2018-12-16 | End: 2018-12-17 | Stop reason: SDUPTHER

## 2018-12-16 RX ORDER — ATORVASTATIN CALCIUM 10 MG/1
10 TABLET, FILM COATED ORAL DAILY
Status: DISCONTINUED | OUTPATIENT
Start: 2018-12-16 | End: 2018-12-21 | Stop reason: HOSPADM

## 2018-12-16 RX ORDER — FOLIC ACID 1 MG/1
1 TABLET ORAL 4 TIMES DAILY
Status: DISCONTINUED | OUTPATIENT
Start: 2018-12-16 | End: 2018-12-21 | Stop reason: HOSPADM

## 2018-12-16 RX ORDER — LANOLIN ALCOHOL/MO/W.PET/CERES
1000 CREAM (GRAM) TOPICAL DAILY
Status: DISCONTINUED | OUTPATIENT
Start: 2018-12-16 | End: 2018-12-21 | Stop reason: HOSPADM

## 2018-12-16 RX ORDER — THIAMINE MONONITRATE (VIT B1) 100 MG
100 TABLET ORAL DAILY
Status: DISCONTINUED | OUTPATIENT
Start: 2018-12-16 | End: 2018-12-21 | Stop reason: HOSPADM

## 2018-12-16 RX ORDER — LANOLIN ALCOHOL/MO/W.PET/CERES
1000 CREAM (GRAM) TOPICAL DAILY
Status: DISCONTINUED | OUTPATIENT
Start: 2018-12-16 | End: 2018-12-16 | Stop reason: SDUPTHER

## 2018-12-16 RX ORDER — GABAPENTIN 300 MG/1
300 CAPSULE ORAL 3 TIMES DAILY
Status: DISCONTINUED | OUTPATIENT
Start: 2018-12-16 | End: 2018-12-21 | Stop reason: HOSPADM

## 2018-12-16 RX ORDER — PANTOPRAZOLE SODIUM 40 MG/1
40 TABLET, DELAYED RELEASE ORAL
Status: DISCONTINUED | OUTPATIENT
Start: 2018-12-16 | End: 2018-12-20 | Stop reason: SDUPTHER

## 2018-12-16 RX ORDER — SODIUM CHLORIDE 9 MG/ML
INJECTION, SOLUTION INTRAVENOUS CONTINUOUS
Status: DISCONTINUED | OUTPATIENT
Start: 2018-12-16 | End: 2018-12-18

## 2018-12-16 RX ORDER — WITCH HAZEL 50 %
1 PADS, MEDICATED (EA) TOPICAL 2 TIMES DAILY
Status: DISCONTINUED | OUTPATIENT
Start: 2018-12-16 | End: 2018-12-16 | Stop reason: CLARIF

## 2018-12-16 RX ORDER — CITALOPRAM 20 MG/1
20 TABLET ORAL DAILY
Status: DISCONTINUED | OUTPATIENT
Start: 2018-12-16 | End: 2018-12-21 | Stop reason: HOSPADM

## 2018-12-16 RX ORDER — POTASSIUM CHLORIDE AND SODIUM CHLORIDE 900; 300 MG/100ML; MG/100ML
INJECTION, SOLUTION INTRAVENOUS CONTINUOUS
Status: DISCONTINUED | OUTPATIENT
Start: 2018-12-16 | End: 2018-12-16

## 2018-12-16 RX ORDER — CLOPIDOGREL BISULFATE 75 MG/1
75 TABLET ORAL DAILY
Status: DISCONTINUED | OUTPATIENT
Start: 2018-12-16 | End: 2018-12-21 | Stop reason: HOSPADM

## 2018-12-16 RX ADMIN — CLOPIDOGREL 75 MG: 75 TABLET, FILM COATED ORAL at 08:35

## 2018-12-16 RX ADMIN — CITALOPRAM HYDROBROMIDE 20 MG: 20 TABLET ORAL at 08:35

## 2018-12-16 RX ADMIN — METOPROLOL TARTRATE 50 MG: 50 TABLET ORAL at 02:01

## 2018-12-16 RX ADMIN — SODIUM CHLORIDE: 9 INJECTION, SOLUTION INTRAVENOUS at 15:05

## 2018-12-16 RX ADMIN — PANTOPRAZOLE SODIUM 40 MG: 40 TABLET, DELAYED RELEASE ORAL at 06:38

## 2018-12-16 RX ADMIN — METOPROLOL TARTRATE 50 MG: 50 TABLET ORAL at 08:38

## 2018-12-16 RX ADMIN — PROBIOTIC PRODUCT - TAB 1 TABLET: TAB at 02:01

## 2018-12-16 RX ADMIN — FOLIC ACID 1 MG: 1 TABLET ORAL at 08:35

## 2018-12-16 RX ADMIN — SODIUM CHLORIDE 50 ML: 9 INJECTION, SOLUTION INTRAVENOUS at 23:55

## 2018-12-16 RX ADMIN — GABAPENTIN 300 MG: 300 CAPSULE ORAL at 08:35

## 2018-12-16 RX ADMIN — FOLIC ACID 1 MG: 1 TABLET ORAL at 15:11

## 2018-12-16 RX ADMIN — LOSARTAN POTASSIUM 25 MG: 25 TABLET ORAL at 08:35

## 2018-12-16 RX ADMIN — CYANOCOBALAMIN TAB 1000 MCG 1000 MCG: 1000 TAB at 08:35

## 2018-12-16 RX ADMIN — GABAPENTIN 300 MG: 300 CAPSULE ORAL at 20:46

## 2018-12-16 RX ADMIN — ATORVASTATIN CALCIUM 10 MG: 10 TABLET, FILM COATED ORAL at 08:35

## 2018-12-16 RX ADMIN — FOLIC ACID 1 MG: 1 TABLET ORAL at 20:46

## 2018-12-16 RX ADMIN — PROBIOTIC PRODUCT - TAB 1 TABLET: TAB at 20:46

## 2018-12-16 RX ADMIN — POTASSIUM CHLORIDE AND SODIUM CHLORIDE: 900; 300 INJECTION, SOLUTION INTRAVENOUS at 02:01

## 2018-12-16 RX ADMIN — ISOSORBIDE MONONITRATE 30 MG: 30 TABLET ORAL at 08:35

## 2018-12-16 RX ADMIN — Medication 100 MG: at 08:35

## 2018-12-16 RX ADMIN — ASPIRIN 81 MG: 81 TABLET, COATED ORAL at 08:35

## 2018-12-16 RX ADMIN — FOLIC ACID 1 MG: 1 TABLET ORAL at 11:57

## 2018-12-16 RX ADMIN — IOPAMIDOL 80 ML: 755 INJECTION, SOLUTION INTRAVENOUS at 23:55

## 2018-12-16 RX ADMIN — PROBIOTIC PRODUCT - TAB 1 TABLET: TAB at 08:35

## 2018-12-16 RX ADMIN — Medication 10 ML: at 23:55

## 2018-12-16 RX ADMIN — GABAPENTIN 300 MG: 300 CAPSULE ORAL at 14:03

## 2018-12-16 ASSESSMENT — ENCOUNTER SYMPTOMS
VOMITING: 0
DIARRHEA: 0
ABDOMINAL DISTENTION: 0
EYES NEGATIVE: 1
COLOR CHANGE: 0
WHEEZING: 0
SHORTNESS OF BREATH: 0
BACK PAIN: 0
ABDOMINAL PAIN: 0
NAUSEA: 1
TROUBLE SWALLOWING: 0
COUGH: 0
BLOOD IN STOOL: 0
CONSTIPATION: 0
VOICE CHANGE: 0
CHOKING: 0
SORE THROAT: 0

## 2018-12-16 ASSESSMENT — PAIN SCALES - GENERAL
PAINLEVEL_OUTOF10: 0
PAINLEVEL_OUTOF10: 0

## 2018-12-16 NOTE — PROGRESS NOTES
sacrum  · Wound Type: None  · Current Nutrition Therapies:  · Oral Diet Orders: General   · Oral Diet intake: Unable to assess  · Oral Nutrition Supplement (ONS) Orders: None  · Anthropometric Measures:  · Ht: 5' 2\" (157.5 cm)   · Current Body Wt: 116 lb 4.8 oz (52.8 kg)  · Admission Body Wt: 125 lb (56.7 kg) (Estimated)  · Usual Body Wt: 119 lb (54 kg)  · Ideal Body Wt: 110 lb (49.9 kg), % Ideal Body 105%  · BMI Classification: BMI 18.5 - 24.9 Normal Weight    Nutrition Interventions:   Continue current diet, Start ONS  Continued Inpatient Monitoring, Coordination of Care    Nutrition Evaluation:   · Evaluation: Goals set   · Goals: Patient able to tolerate PO intake >75% of estimated kcal/protein needs.     · Monitoring: Meal Intake, Supplement Intake, Diet Tolerance, Skin Integrity, I&O, Weight, Pertinent Labs, Nausea or Vomiting, Monitor Bowel Function      Electronically signed by Sydney Horton RD, LD on 12/16/18 at 1:44 PM    Contact Number: [phone]

## 2018-12-16 NOTE — CONSULTS
NEPHROLOGY CONSULT       Reason for Consult: Hypokalemia and hyponatremia      Chief Complaint:  Fatigue  History Obtained From:  Tiffany oro HR record    History of Present Illness: This is a 72 y.o. female with history of active smoking of one pack per day, started smoking at 27years old , seizure disorder who presented with generalized fatigue and weakness for the past 5 days. She fell three  times day prior to admission and had prodromal symptoms of dizziness and lightheadedness. She denied any loss of consciousness with falls. No fever or chills cough, chest pain shortness of breath. She did mention she has been sick to the stomach with nausea but no vomiting and has had aversion to food. She had  EGD several months ago which showed moderate erosive duodenitis and gastritis and and had chronic anemia at that time. On presentation she had a potassium of 2.9 with serum sodium of 123. Her urine osmolarity was  537. She denies headaches, disorientation or recent seizures. He has lost about 20 pounds over 6 months. Patient has been on citalopram at home -denies THIAZIDE diuretics at home. States she has been drinking plenty of water at home. CT chest in June 2018 did show multiple pulmonary nodules with no concerning adenopathy  In the past she had serum sodium of 125 in June 2018.     Past Medical History:        Diagnosis Date    Seizures ([address]     pt stated was three years ago       Past Surgical History:        Procedure Laterality Date    CARDIAC SURGERY      stents    [place] ESOPHAGOGASTRODUODENOSCOPY TRANSORAL DIAGNOSTIC N/A 6/23/2018    EGD DIAGNOSTIC ONLY performed by Young Schultz MD at [address]  06/23/2018       Current Medications:      aspirin EC tablet 81 mg Daily   atorvastatin (LIPITOR) tablet 10 mg Daily   citalopram (CELEXA) tablet 20 mg Daily   clopidogrel (PLAVIX) tablet 75 mg Daily   folic acid (FOLVITE) tablet 1 mg 4x daily   gabapentin Av.9 °F (36.6 °C), Min:97.5 °F (36.4 °C), Max:98.4 °F (36.9 °C)    CURRENT RESPIRATORY RATE:  Resp: 20  CURRENT PULSE:  Pulse: 82  CURRENT BLOOD PRESSURE:  BP: 127/62  24HR BLOOD PRESSURE RANGE:  Systolic (80RJJ), BOK:610 , Min:94 , OA   ; Diastolic (74HVA), MDB:61, Min:54, Max:105    24HR INTAKE/OUTPUT:    Intake/Output Summary (Last 24 hours) at 18 1128  Last data filed at 18 3991   Gross per 24 hour   Intake             1566 ml   Output              100 ml   Net             1466 ml     Patient Vitals for the past 96 hrs (Last 3 readings):   Weight   18 0312 116 lb 4.8 oz (52.8 kg)   18 0103 116 lb 6.4 oz (52.8 kg)   12/15/18 2218 125 lb (56.7 kg)         Physical Exam:  GENERAL APPEARANCE: Alert and cooperative, and appears to be in no acute distress. HEAD: normocephalic  EYES: PERRL, EOMI. Not pale, anicteric   NOSE:  No nasal discharge. THROAT:  Oral cavity and pharynx normal. Moist  NECK: Neck supple, non-tender without lymphadenopathy, masses or thyromegaly. JVD-neg  CARDIAC: Normal S1 and S2. No S3, S4 or murmurs. Rhythm is regular. LUNGS: Clear to auscultation and percussion without rales, rhonchi, wheezing or diminished breath sounds. ABD-Soft non distended, BS+ Non tender no organomegally  BACK: Examination of the spine reveals  no spinal deformity, without tenderness,   MUSKULOSKELETAL: Adequately aligned spine. No joint erythema or tenderness. EXTREMITIES: No edema. Peripheral pulses intact.    NEURO:Alert oriented x 3 ,power 5/5 in all extremities      Labs:   CBC:  Recent Labs      12/15/18   2226   WBC  8.7   RBC  3.16*   HGB  10.1*   HCT  29.0*   MCV  91.7   MCH  31.9   MCHC  34.8   RDW  13.8   PLT  163   MPV  9.4      BMP: Recent Labs      12/15/18   2226  18   0646   NA  123*  126*   K  2.9*  3.6*   CL  80*  87*   CO2  18*  20   BUN  14  13   CREATININE  0.56  0.44*   GLUCOSE  231*  212*   CALCIUM  9.6  8.6        Phosphorus:  No results for input(s): PHOS in the last 72 hours. Magnesium:   Recent Labs      12/16/18   0139   MG  1.4*     Albumin:   Recent Labs      12/15/18   2226  12/16/18   0139   LABALBU  3.9  3.6       IRON:    Lab Results   Component Value Date    IRON 148 12/16/2018     Iron Saturation:  No components found for: PERCENTFE  TIBC:    Lab Results   Component Value Date    TIBC  12/16/2018     Unable to calculate due to low iron binding result. FERRITIN:    Lab Results   Component Value Date    FERRITIN 1,870 12/16/2018     SPEP: Lab Results   Component Value Date    PROT 6.1 12/16/2018     UPEP: No results found for: TPU     C3: No results found for: C3  C4: No results found for: C4  MPO ANCA:  No results found for: MPO . PR3 ANCA:  No results found for: PR3  Urine Sodium:  No results found for: ANDREA   Urine Potassium:  No results found for: KUR  Urine Chloride:  No components found for: CLU  Urine Ph:  No components found for: PO4U  Urine Osmolarity:    Lab Results   Component Value Date    OSMOU 537 12/16/2018     Urine Creatinine:  No results found for: LABCREA  Urine Eosinophils: No components found for: EOSU  Urine Protein:  No results found for: TPU  Urinalysis:  U/A: Lab Results   Component Value Date    NITRU NEGATIVE 12/15/2018    COLORU YELLOW 12/15/2018    PHUR 6.5 12/15/2018    WBCUA 2 TO 5 12/15/2018    RBCUA 0 TO 2 12/15/2018    MUCUS NOT REPORTED 12/15/2018    TRICHOMONAS NOT REPORTED 12/15/2018    YEAST NOT REPORTED 12/15/2018    BACTERIA NOT REPORTED 12/15/2018    SPECGRAV 1.015 12/15/2018    LEUKOCYTESUR SMALL 12/15/2018    UROBILINOGEN Normal 12/15/2018    BILIRUBINUR  12/15/2018     Presumptive positive. Unable to confirm due to unavailability of reagent. GLUCOSEU NEGATIVE 12/15/2018    KETUA 2+ 12/15/2018    AMORPHOUS NOT REPORTED 12/15/2018         Radiology:  Reviewed as available. Assessment: 1. Hyponatremia, moderately severe with clinically hypovolemic status likely secondary to dehydration, ADH excess and

## 2018-12-16 NOTE — ED PROVIDER NOTES
[address] [place] [address] ED  eMERGENCY dEPARTMENT eNCOUnter      Pt Name: Orlando Jacinto  MRN: [id number]  Cyndygfroger 1945  Date of evaluation: 12/15/2018  Provider: JUAN Gold CNP    CHIEF COMPLAINT       Chief Complaint   Patient presents with    Fatigue     x 5 days    Fall     yesterday x 3 no LOC         HISTORY OF PRESENT ILLNESS  (Location/Symptom, Timing/Onset, Context/Setting, Quality, Duration, Modifying Factors, Severity.)   Orlando Jacinto is a 72 y.o. female who presents to the emergency department Via squad with fatigue. She has had generalized weakness and fatigue for the past 5 days. She has not eaten in 5 days. She states that she fell 3 times yesterday. She denies dizziness prior to these falls. She denies any head injury or loss of consciousness with these falls. She denies neck, back or extremity pain. She has not had any recent illnesses, fevers or coughs. She did have abdominal pain last week which has resolved. No nausea, vomiting or diarrhea. She does complain of constipation and does not remember her last bowel movement. No urinary symptoms. Previous records were reviewed. She was seen for frequent falls in June of this year which according to her discharge paperwork were related to instability from alcohol use. She was also anemic at that visit with hemoglobin at 7.2. She denies blood in the stool today. She had EGD on June 23 which showed moderate erosive duodenitis and gastritis. She refused the colonoscopy. She also had abnormal CT scan with multiple nodules which was to be followed up in 3 months. She has not yet had that follow-up. Nursing Notes were reviewed. ALLERGIES     Penicillins    CURRENT MEDICATIONS       Previous Medications    ALPRAZOLAM (XANAX) 0.25 MG TABLET    Take 0.25 mg by mouth 3 times daily as needed for Sleep. .    ASPIRIN 81 MG TABLET    Take 1 tablet by mouth daily    ATORVASTATIN (LIPITOR) 10 MG TABLET    Take 10 mg by mouth Lymphadenopathy:     She has no cervical adenopathy. Neurological:   She is drowsy but easily arousable. She is alert and oriented ×3. She is able to provide a limited history. She is following commands appropriately. No cranial nerve deficit. Weak but equal upper and lower extremities 3/5   Skin: Skin is warm and dry. No erythema. DIAGNOSTIC RESULTS     EKG: All EKG's are interpreted by the Emergency Department Physician who either signs or Co-signs this chart in the absence of a cardiologist.    EKG was interpreted by Dr. Cage An:   Non-plain film images such as CT, Ultrasound and MRI are read by the radiologist. Olivier Amin radiographic images are visualized and preliminarily interpreted by the emergency physician with the below findings:    Interpretation per the Radiologist below, if available at the time of this note:    CT Head WO Contrast   Final Result   No acute intracranial abnormality. XR CHEST PORTABLE   Final Result   No acute cardiopulmonary abnormality.                LABS:  Labs Reviewed   TOX SCR, BLD, ED - Abnormal; Notable for the following:        Result Value    Salicylate Lvl <1 (*)     Acetaminophen Level <10 (*)     All other components within normal limits   CBC WITH AUTO DIFFERENTIAL - Abnormal; Notable for the following:     RBC 3.16 (*)     Hemoglobin 10.1 (*)     Hematocrit 29.0 (*)     Seg Neutrophils 76 (*)     Lymphocytes 13 (*)     Monocytes 10 (*)     All other components within normal limits   BASIC METABOLIC PANEL - Abnormal; Notable for the following:     Glucose 231 (*)     Bun/Cre Ratio 25 (*)     Sodium 123 (*)     Potassium 2.9 (*)     Chloride 80 (*)     CO2 18 (*)     Anion Gap 25 (*)     All other components within normal limits   TROP/MYOGLOBIN - Abnormal; Notable for the following:     Myoglobin 63 (*)     All other components within normal limits   LIPASE - Abnormal; Notable for the following:     Lipase 63 (*)     All other components

## 2018-12-16 NOTE — PROGRESS NOTES
Patient arrived to room from ED via stretcher. Patient alert and oriented to room. Patient resting comfortably. Call light within reach. Will continue to monitor.

## 2018-12-16 NOTE — FLOWSHEET NOTE
Patient was having a tough time, mostly wanted to rest.  Patient excepted prayer.  shares in presence, prayers. Follow up as needed. 12/16/18 0465   Encounter Summary   Services provided to: Patient   Referral/Consult From: [facility] Family members   Place [facility] Yes   Continue Visiting ([id number])   Complexity of Encounter Low   Length of Encounter 15 minutes   Spiritual Assessment Completed Yes   Routine   Type Initial   Assessment Passive   Intervention Explored feelings, thoughts, concerns;Prayer;Sustaining presence/ Ministry of presence; Discussed illness/injury and it's impact; Discussed belief system/Adventism practices/jovi   Outcome Expressed gratitude;Receptive

## 2018-12-17 ENCOUNTER — ANESTHESIA EVENT (OUTPATIENT)
Dept: OPERATING ROOM | Age: 73
DRG: 643 | End: 2018-12-17
Payer: MEDICARE

## 2018-12-17 LAB
ANION GAP SERPL CALCULATED.3IONS-SCNC: 11 MMOL/L (ref 9–17)
ANION GAP SERPL CALCULATED.3IONS-SCNC: 15 MMOL/L (ref 9–17)
ANION GAP SERPL CALCULATED.3IONS-SCNC: 16 MMOL/L (ref 9–17)
ANION GAP SERPL CALCULATED.3IONS-SCNC: 17 MMOL/L (ref 9–17)
BUN BLDV-MCNC: 9 MG/DL (ref 8–23)
BUN/CREAT BLD: ABNORMAL (ref 9–20)
CALCIUM SERPL-MCNC: 8.2 MG/DL (ref 8.6–10.4)
CHLORIDE BLD-SCNC: 88 MMOL/L (ref 98–107)
CHLORIDE BLD-SCNC: 89 MMOL/L (ref 98–107)
CHLORIDE BLD-SCNC: 89 MMOL/L (ref 98–107)
CHLORIDE BLD-SCNC: 90 MMOL/L (ref 98–107)
CHLORIDE, UR: 79 MMOL/L
CO2: 18 MMOL/L (ref 20–31)
CO2: 20 MMOL/L (ref 20–31)
CO2: 21 MMOL/L (ref 20–31)
CO2: 21 MMOL/L (ref 20–31)
CREAT SERPL-MCNC: <0.4 MG/DL (ref 0.5–0.9)
CULTURE: NORMAL
ESTIMATED AVERAGE GLUCOSE: 97 MG/DL
GFR AFRICAN AMERICAN: ABNORMAL ML/MIN
GFR NON-AFRICAN AMERICAN: ABNORMAL ML/MIN
GFR SERPL CREATININE-BSD FRML MDRD: ABNORMAL ML/MIN/{1.73_M2}
GFR SERPL CREATININE-BSD FRML MDRD: ABNORMAL ML/MIN/{1.73_M2}
GLUCOSE BLD-MCNC: 186 MG/DL (ref 65–105)
GLUCOSE BLD-MCNC: 211 MG/DL (ref 70–99)
GLUCOSE BLD-MCNC: 267 MG/DL (ref 65–105)
GLUCOSE BLD-MCNC: 324 MG/DL (ref 65–105)
HBA1C MFR BLD: 5 % (ref 4–6)
Lab: NORMAL
MAGNESIUM: 1.2 MG/DL (ref 1.6–2.6)
PHOSPHORUS: 0.9 MG/DL (ref 2.6–4.5)
POTASSIUM SERPL-SCNC: 3.1 MMOL/L (ref 3.7–5.3)
POTASSIUM SERPL-SCNC: 3.4 MMOL/L (ref 3.7–5.3)
POTASSIUM SERPL-SCNC: 3.4 MMOL/L (ref 3.7–5.3)
POTASSIUM SERPL-SCNC: 3.7 MMOL/L (ref 3.7–5.3)
POTASSIUM, UR: 29.6 MMOL/L
SODIUM BLD-SCNC: 120 MMOL/L (ref 135–144)
SODIUM BLD-SCNC: 124 MMOL/L (ref 135–144)
SODIUM BLD-SCNC: 124 MMOL/L (ref 135–144)
SODIUM BLD-SCNC: 127 MMOL/L (ref 135–144)
SODIUM,UR: 39 MMOL/L
SPECIMEN DESCRIPTION: NORMAL
STATUS: NORMAL

## 2018-12-17 PROCEDURE — 6360000002 HC RX W HCPCS: Performed by: INTERNAL MEDICINE

## 2018-12-17 PROCEDURE — 80051 ELECTROLYTE PANEL: CPT

## 2018-12-17 PROCEDURE — 2580000003 HC RX 258: Performed by: INTERNAL MEDICINE

## 2018-12-17 PROCEDURE — G8979 MOBILITY GOAL STATUS: HCPCS

## 2018-12-17 PROCEDURE — 6370000000 HC RX 637 (ALT 250 FOR IP): Performed by: INTERNAL MEDICINE

## 2018-12-17 PROCEDURE — 97535 SELF CARE MNGMENT TRAINING: CPT

## 2018-12-17 PROCEDURE — 97164 PT RE-EVAL EST PLAN CARE: CPT

## 2018-12-17 PROCEDURE — 97161 PT EVAL LOW COMPLEX 20 MIN: CPT

## 2018-12-17 PROCEDURE — 82947 ASSAY GLUCOSE BLOOD QUANT: CPT

## 2018-12-17 PROCEDURE — 97530 THERAPEUTIC ACTIVITIES: CPT

## 2018-12-17 PROCEDURE — G8978 MOBILITY CURRENT STATUS: HCPCS

## 2018-12-17 PROCEDURE — 83735 ASSAY OF MAGNESIUM: CPT

## 2018-12-17 PROCEDURE — G8987 SELF CARE CURRENT STATUS: HCPCS

## 2018-12-17 PROCEDURE — 80048 BASIC METABOLIC PNL TOTAL CA: CPT

## 2018-12-17 PROCEDURE — 2060000000 HC ICU INTERMEDIATE R&B

## 2018-12-17 PROCEDURE — 36415 COLL VENOUS BLD VENIPUNCTURE: CPT

## 2018-12-17 PROCEDURE — 84133 ASSAY OF URINE POTASSIUM: CPT

## 2018-12-17 PROCEDURE — 84300 ASSAY OF URINE SODIUM: CPT

## 2018-12-17 PROCEDURE — 82436 ASSAY OF URINE CHLORIDE: CPT

## 2018-12-17 PROCEDURE — 2500000003 HC RX 250 WO HCPCS: Performed by: INTERNAL MEDICINE

## 2018-12-17 PROCEDURE — G8988 SELF CARE GOAL STATUS: HCPCS

## 2018-12-17 PROCEDURE — 97166 OT EVAL MOD COMPLEX 45 MIN: CPT

## 2018-12-17 PROCEDURE — 99232 SBSQ HOSP IP/OBS MODERATE 35: CPT | Performed by: INTERNAL MEDICINE

## 2018-12-17 PROCEDURE — 84100 ASSAY OF PHOSPHORUS: CPT

## 2018-12-17 RX ORDER — LORAZEPAM 2 MG/ML
1 INJECTION INTRAMUSCULAR ONCE
Status: COMPLETED | OUTPATIENT
Start: 2018-12-17 | End: 2018-12-17

## 2018-12-17 RX ORDER — MULTIVITAMIN WITH FOLIC ACID 400 MCG
1 TABLET ORAL DAILY
Status: DISCONTINUED | OUTPATIENT
Start: 2018-12-20 | End: 2018-12-21 | Stop reason: HOSPADM

## 2018-12-17 RX ORDER — LORAZEPAM 2 MG/ML
2 INJECTION INTRAMUSCULAR
Status: DISCONTINUED | OUTPATIENT
Start: 2018-12-17 | End: 2018-12-21 | Stop reason: HOSPADM

## 2018-12-17 RX ORDER — LORAZEPAM 2 MG/ML
4 INJECTION INTRAMUSCULAR
Status: DISCONTINUED | OUTPATIENT
Start: 2018-12-17 | End: 2018-12-21 | Stop reason: HOSPADM

## 2018-12-17 RX ORDER — LORAZEPAM 1 MG/1
4 TABLET ORAL
Status: DISCONTINUED | OUTPATIENT
Start: 2018-12-17 | End: 2018-12-21 | Stop reason: HOSPADM

## 2018-12-17 RX ORDER — POTASSIUM CHLORIDE 20 MEQ/1
40 TABLET, EXTENDED RELEASE ORAL PRN
Status: DISCONTINUED | OUTPATIENT
Start: 2018-12-17 | End: 2018-12-21 | Stop reason: HOSPADM

## 2018-12-17 RX ORDER — POTASSIUM CHLORIDE 7.45 MG/ML
10 INJECTION INTRAVENOUS PRN
Status: DISCONTINUED | OUTPATIENT
Start: 2018-12-17 | End: 2018-12-21 | Stop reason: HOSPADM

## 2018-12-17 RX ORDER — TOLVAPTAN 15 MG/1
15 TABLET ORAL ONCE
Status: COMPLETED | OUTPATIENT
Start: 2018-12-17 | End: 2018-12-18

## 2018-12-17 RX ORDER — ONDANSETRON 2 MG/ML
4 INJECTION INTRAMUSCULAR; INTRAVENOUS EVERY 6 HOURS PRN
Status: DISCONTINUED | OUTPATIENT
Start: 2018-12-17 | End: 2018-12-17 | Stop reason: CLARIF

## 2018-12-17 RX ORDER — POTASSIUM CHLORIDE 20MEQ/15ML
40 LIQUID (ML) ORAL PRN
Status: DISCONTINUED | OUTPATIENT
Start: 2018-12-17 | End: 2018-12-21 | Stop reason: HOSPADM

## 2018-12-17 RX ORDER — LORAZEPAM 1 MG/1
3 TABLET ORAL
Status: DISCONTINUED | OUTPATIENT
Start: 2018-12-17 | End: 2018-12-21 | Stop reason: HOSPADM

## 2018-12-17 RX ORDER — LORAZEPAM 2 MG/ML
1 INJECTION INTRAMUSCULAR
Status: DISCONTINUED | OUTPATIENT
Start: 2018-12-17 | End: 2018-12-21 | Stop reason: HOSPADM

## 2018-12-17 RX ORDER — LORAZEPAM 1 MG/1
1 TABLET ORAL
Status: DISCONTINUED | OUTPATIENT
Start: 2018-12-17 | End: 2018-12-21 | Stop reason: HOSPADM

## 2018-12-17 RX ORDER — LORAZEPAM 2 MG/ML
3 INJECTION INTRAMUSCULAR
Status: DISCONTINUED | OUTPATIENT
Start: 2018-12-17 | End: 2018-12-21 | Stop reason: HOSPADM

## 2018-12-17 RX ORDER — ONDANSETRON 4 MG/1
4 TABLET, ORALLY DISINTEGRATING ORAL EVERY 6 HOURS PRN
Status: DISCONTINUED | OUTPATIENT
Start: 2018-12-17 | End: 2018-12-21 | Stop reason: HOSPADM

## 2018-12-17 RX ORDER — ONDANSETRON 2 MG/ML
4 INJECTION INTRAMUSCULAR; INTRAVENOUS EVERY 6 HOURS PRN
Status: DISCONTINUED | OUTPATIENT
Start: 2018-12-17 | End: 2018-12-21 | Stop reason: HOSPADM

## 2018-12-17 RX ORDER — SODIUM CHLORIDE 0.9 % (FLUSH) 0.9 %
10 SYRINGE (ML) INJECTION EVERY 12 HOURS SCHEDULED
Status: DISCONTINUED | OUTPATIENT
Start: 2018-12-17 | End: 2018-12-21 | Stop reason: HOSPADM

## 2018-12-17 RX ORDER — LORAZEPAM 1 MG/1
2 TABLET ORAL
Status: DISCONTINUED | OUTPATIENT
Start: 2018-12-17 | End: 2018-12-21 | Stop reason: HOSPADM

## 2018-12-17 RX ORDER — MAGNESIUM SULFATE 1 G/100ML
1 INJECTION INTRAVENOUS PRN
Status: DISCONTINUED | OUTPATIENT
Start: 2018-12-17 | End: 2018-12-21 | Stop reason: HOSPADM

## 2018-12-17 RX ORDER — SODIUM CHLORIDE 0.9 % (FLUSH) 0.9 %
10 SYRINGE (ML) INJECTION PRN
Status: DISCONTINUED | OUTPATIENT
Start: 2018-12-17 | End: 2018-12-21 | Stop reason: HOSPADM

## 2018-12-17 RX ORDER — FOLIC ACID 1 MG/1
1 TABLET ORAL DAILY
Status: DISCONTINUED | OUTPATIENT
Start: 2018-12-17 | End: 2018-12-17 | Stop reason: SDUPTHER

## 2018-12-17 RX ADMIN — ISOSORBIDE MONONITRATE 30 MG: 30 TABLET ORAL at 09:27

## 2018-12-17 RX ADMIN — INSULIN LISPRO 4 UNITS: 100 INJECTION, SOLUTION INTRAVENOUS; SUBCUTANEOUS at 21:22

## 2018-12-17 RX ADMIN — Medication 100 MG: at 09:28

## 2018-12-17 RX ADMIN — FOLIC ACID 1 MG: 1 TABLET ORAL at 13:02

## 2018-12-17 RX ADMIN — ENOXAPARIN SODIUM 40 MG: 40 INJECTION SUBCUTANEOUS at 21:23

## 2018-12-17 RX ADMIN — ATORVASTATIN CALCIUM 10 MG: 10 TABLET, FILM COATED ORAL at 09:28

## 2018-12-17 RX ADMIN — SODIUM PHOSPHATE, MONOBASIC, MONOHYDRATE AND SODIUM PHOSPHATE, DIBASIC, ANHYDROUS 16.89 MMOL: 276; 142 INJECTION, SOLUTION INTRAVENOUS at 17:22

## 2018-12-17 RX ADMIN — CITALOPRAM HYDROBROMIDE 20 MG: 20 TABLET ORAL at 09:27

## 2018-12-17 RX ADMIN — GABAPENTIN 300 MG: 300 CAPSULE ORAL at 09:28

## 2018-12-17 RX ADMIN — PANTOPRAZOLE SODIUM 40 MG: 40 TABLET, DELAYED RELEASE ORAL at 05:59

## 2018-12-17 RX ADMIN — GABAPENTIN 300 MG: 300 CAPSULE ORAL at 14:17

## 2018-12-17 RX ADMIN — LOSARTAN POTASSIUM 25 MG: 25 TABLET ORAL at 09:28

## 2018-12-17 RX ADMIN — MAGNESIUM SULFATE HEPTAHYDRATE 1 G: 1 INJECTION, SOLUTION INTRAVENOUS at 14:18

## 2018-12-17 RX ADMIN — POTASSIUM CHLORIDE 40 MEQ: 20 TABLET, EXTENDED RELEASE ORAL at 06:34

## 2018-12-17 RX ADMIN — ASPIRIN 81 MG: 81 TABLET, COATED ORAL at 09:28

## 2018-12-17 RX ADMIN — MAGNESIUM SULFATE HEPTAHYDRATE 1 G: 1 INJECTION, SOLUTION INTRAVENOUS at 16:27

## 2018-12-17 RX ADMIN — MAGNESIUM SULFATE HEPTAHYDRATE 1 G: 1 INJECTION, SOLUTION INTRAVENOUS at 15:21

## 2018-12-17 RX ADMIN — FOLIC ACID 1 MG: 1 TABLET ORAL at 09:32

## 2018-12-17 RX ADMIN — CLOPIDOGREL 75 MG: 75 TABLET, FILM COATED ORAL at 09:27

## 2018-12-17 RX ADMIN — FOLIC ACID: 5 INJECTION, SOLUTION INTRAMUSCULAR; INTRAVENOUS; SUBCUTANEOUS at 23:51

## 2018-12-17 RX ADMIN — METOPROLOL TARTRATE 50 MG: 50 TABLET ORAL at 09:28

## 2018-12-17 RX ADMIN — CYANOCOBALAMIN TAB 1000 MCG 1000 MCG: 1000 TAB at 09:27

## 2018-12-17 RX ADMIN — MAGNESIUM SULFATE HEPTAHYDRATE 1 G: 1 INJECTION, SOLUTION INTRAVENOUS at 13:03

## 2018-12-17 RX ADMIN — PROBIOTIC PRODUCT - TAB 1 TABLET: TAB at 09:28

## 2018-12-17 RX ADMIN — LORAZEPAM 1 MG: 2 INJECTION, SOLUTION INTRAMUSCULAR; INTRAVENOUS at 17:22

## 2018-12-17 NOTE — PROGRESS NOTES
Occupational Therapy   Occupational Therapy Initial Assessment  Date: 2018   Patient Name: Margo Castillo  MRN: [id number]     : 1945    Date of Service: 2018    Discharge Recommendations:  [address] RN reports patient is medically stable for therapy treatment this date. Chart reviewed prior to treatment and patient is agreeable for therapy. All lines intact and patient positioned comfortably at end of treatment. All patient needs addressed prior to ending therapy session. Patient Diagnosis(es): The encounter diagnosis was Hyponatremia. has a past medical history of Seizures ([facility] [address] has a past surgical history that includes Cardiac surgery; Upper gastrointestinal endoscopy (2018); and pr esophagogastroduodenoscopy transoral diagnostic (N/A, 2018).            Restrictions  Restrictions/Precautions  Restrictions/Precautions: General Precautions, Fall Risk  Required Braces or Orthoses?: No    Subjective   General  Chart Reviewed: Yes  Patient assessed for rehabilitation services?: Yes  Family / Caregiver Present: No  Pain Assessment  Patient Currently in Pain: Denies  Pain Level: 0  Pain Type: Acute pain  Pain Location: Abdomen  Pain Descriptors: Cramping  Pain Frequency: Intermittent  Clinical Progression: Not changed  Patient's Stated Pain Goal: No pain     Social/Functional History  Social/Functional History  Lives With: Alone  Type of Home: Apartment  Home Layout: Multi-level  Home Access: Elevator (3rd floor )  Bathroom Shower/Tub: Tub/Shower unit (doesn't use d/t fear of getting in/out safely)  Bathroom Toilet: Standard  Bathroom Equipment: Shower chair  Home Equipment: Rolling walker  Receives Help From: Family (pt has 2 sisters that help at times, mostly with cleaning)  ADL Assistance: Independent (pt has been independent with sponge bathing/dressing)  Homemaking Assistance: Needs assistance (pt is independent with simple daily tasks,

## 2018-12-17 NOTE — CONSULTS
GI Consult Note:    Name: Keith Mesa  MRN: [id number]     [place]: [de-identified]  Room: [address] [place] [address]    Admit Date: 12/15/2018  PCP: No primary care provider on file. Physician Requesting Consult: Priyanka Carias MD     Reason for Consult:  Patient seen with the symptoms of extreme fatigability, weakness, tiredness and weight loss. Chief Complaint:     Chief Complaint   Patient presents with    Fatigue     x 5 days    Fall     yesterday x 3 no LOC       History Obtained From:     patient, electronic medical record, nursing staff. History of Present Illness:      Keith Mesa is a  72 y.o.  female who presents with Fatigue (x 5 days) and Fall (yesterday x 3 no LOC)      Symptoms: This is a 82-year-old a patient seen at [facility].    Patient is admitted with extreme weakness tiredness and fatigability. She was not able to take care of herself at home. In the emergency room she was found to be hyponatremic. On further discussion patient stated that she has poor appetite. She last about 20 pounds in the last 1 year. She denies dysphagia. However has substernal discomfort. She does have nausea. However no emesis. Denies abdominal pain, distention, bloating. She has satisfactory bowel movements without melena or hematochezia. No history of NSAID use. 3 years ago this patient had duodenal ulcers diagnosed when she was in [place]. She took medications for ulcer disease for a few months. She used to drink alcohol moderately heavily and stopped drinking 4 months ago. Not known to have liver disease, pancreatitis. Her previous records reviewed. In June 2018, patient had a CT of the chest done which revealed suspicious lesions. She had a EGD done in June 2018 which revealed gastritis and duodenitis. At that time biopsies from the duodenum and stomach were unremarkable. Her labs noted and she is severely hyponatremic. Hemoglobin is about 10.1. Lipase is minimally elevated. Platelet count is about 163. Past Medical History:     Past Medical History:   Diagnosis Date    Seizures ([address]     pt stated was three years ago        Past Surgical History:     Past Surgical History:   Procedure Laterality Date    CARDIAC SURGERY      stents    [place] ESOPHAGOGASTRODUODENOSCOPY TRANSORAL DIAGNOSTIC N/A 6/23/2018    EGD DIAGNOSTIC ONLY performed by Alisha Pittman MD at [address]  06/23/2018        Medications Prior to Admission:       Prior to Admission medications    Medication Sig Start Date End Date Taking? Authorizing Provider   aspirin 81 MG tablet Take 1 tablet by mouth daily 6/24/18   Tad Barton MD   pantoprazole (PROTONIX) 40 MG tablet Take 1 tablet by mouth every morning (before breakfast) 6/25/18   Tad Barton MD   vitamin B-1 100 MG tablet Take 1 tablet by mouth daily 6/25/18   Tad Barton MD   folic acid (FOLVITE) 1 MG tablet Take 1 tablet by mouth 4 times daily 6/24/18   Tad Barton MD   cyanocobalamin ([employer] VITAMIN B12) 1000 MCG tablet Take 1 tablet by mouth daily 6/24/18   Tad Barton MD   nicotine (NICODERM CQ) 21 MG/24HR Place 1 patch onto the skin every 24 hours 6/24/18 6/24/19  Tad Barton MD   sodium chloride 1 g tablet Take 1 tablet by mouth 3 times daily for 14 days 6/24/18 7/8/18  Tad Barton MD   SODIUM CHLORIDE PO Take 1 g by mouth 3 times daily as needed    Historical Provider, MD   potassium chloride (KLOR-CON) 20 MEQ packet Take 20 mEq by mouth 2 times daily    Historical Provider, MD   gabapentin (NEURONTIN) 300 MG capsule Take 300 mg by mouth 3 times daily. Betsy Alejo     Historical Provider, MD   metoprolol (LOPRESSOR) 100 MG tablet Take 50 mg by mouth 2 times daily    Historical Provider, MD   atorvastatin (LIPITOR) 10 MG tablet Take 10 mg by mouth daily    Historical Provider, MD   vitamin B-12 (CYANOCOBALAMIN) 1000 MCG tablet Take 1,000 mcg by mouth daily    Historical Provider, MD Esophageal dysphagia [R13.10] 12/16/2018    Unintentional weight loss [R63.4] 12/16/2018   Malnutrition. Plan:     1. Patient needs EGD to evaluate upper GI lesions. 2. Given that she was found to have pulmonary lesions, she may need further workup regarding this. 3. Hyponatremia etiology not clear. Need to rule out SIADH. 4. We'll get stool for occult blood as well. 5. If she did not have colonoscopy in the past she needs this investigation as well. 6. Discussed with the patient and nursing staff regarding the care. Thank you for allowing me to participate in the care of your patient. Please feel free to contact me with anyquestions or concerns. Electronically signed by Monet Beltran MD on 12/16/2018 at 10:25 PM     Copy sent to Dr. Guajardo primary care provider on file. Note is dictated utilizing voice recognition software. Unfortunately this leads to occasional typographical errors. Please contact our office if you have any questions.

## 2018-12-17 NOTE — DISCHARGE INSTR - COC
Continuity of Care Form    Patient Name: Maria A Banks   :  1945  MRN:  [id number]    Admit date:  12/15/2018  Discharge date:  ***    Code Status Order: Prior   Advance Directives:   [facility] Documentation     Date/Time Healthcare Directive Type of Healthcare Directive Copy in [address] Agent's Name Healthcare Agent's Phone Number    [phone]  No, patient does not have an advance directive for healthcare treatment -- -- -- -- --          Admitting Physician:  Janet Simon MD  PCP: No primary care provider on file. Discharging Nurse: [facility] Unit/Room#: 3315/1415-30  Discharging Unit Phone Number: [phone]    Emergency Contact:   Extended Emergency Contact Information  Primary Emergency Contact: Octavio Church [address] Phone: [phone]  Mobile Phone: [phone]  Relation: Brother/Sister  Secondary Emergency Contact: Miguel Pinto[address] Phone: [phone]  Relation: Brother/Sister    Past Surgical History:  Past Surgical History:   Procedure Laterality Date    CARDIAC SURGERY      stents    [place] ESOPHAGOGASTRODUODENOSCOPY TRANSORAL DIAGNOSTIC N/A 2018    EGD DIAGNOSTIC ONLY performed by Duane Thao MD at [place] [address]  2018       Immunization History: There is no immunization history on file for this patient.     Active Problems:  Patient Active Problem List   Diagnosis Code    Anemia D64.9    Recurrent falls while walking R29.6    PVD (peripheral vascular disease) (HCC) I73.9    Chronic alcohol abuse F10.10    Pulmonary nodules/lesions, multiple R91.8    Tobacco abuse Z72.0    Dizziness R42    Hyponatremia E87.1    Hypokalemia E87.6    Generalized weakness R53.1    Normocytic normochromic anemia D64.9    History of fall Z91.81    Esophageal dysphagia R13.10    Unintentional weight loss R63.4

## 2018-12-17 NOTE — PROGRESS NOTES
Pt resting in bed without distress, pt becoming more confused as day goes on, pt reoriented and bed alarm on at all times for safety

## 2018-12-17 NOTE — PROGRESS NOTES
(x2 to reposition in bed)  Transfers  Sit to Stand: Moderate Assistance (2nd person for safety due to dizziness)  Stand to sit: Moderate Assistance (Pt. sat in unsafe manner, too far away from bed; very dizzy; second person for safety and providing min assist when sitting)  Bed to Chair: Moderate assistance  Stand Pivot Transfers: Moderate Assistance  Ambulation  Ambulation?: Yes  Ambulation 1  Device: Rolling Walker  Assistance: Moderate assistance  Quality of Gait: Pt. amb. 5ft. away from bed and had to return to bed due to extreme dizziness  Distance: 10 ft. total   Comments: back to bed with bed alarm     Balance  Sitting - Static: Fair  Sitting - Dynamic: Fair  Standing - Static: Fair  Standing - Dynamic: Poor        Assessment   Body structures, Functions, Activity limitations: Decreased functional mobility ; Decreased strength;Decreased endurance;Decreased balance;Decreased safe awareness  Assessment: Pt. with confusion and dizziness making her a high fall risk at present; dizziness is preventing ambulation at this time. Falling at home due to dizziness. Recommending SNF at this time. Prognosis: Good  Decision Making: Low Complexity  Barriers to Learning: confusion  Activity Tolerance  Activity Tolerance: Patient limited by endurance; Patient limited by fatigue; Other (dizziness)         Plan   Plan  Times per week: 1-2x/day, 5-6x/week  Current Treatment Recommendations: Strengthening, Balance Training, Functional Mobility Training, Transfer Training, Gait Training, Safety Education & Training  Safety Devices  Type of devices: Bed alarm in place, Call light within reach, Left in bed, Nurse notified  Restraints  Initially in place: No    G-Code  PT G-Codes  Functional Assessment Tool Used: am pac  Score: 11  Functional Limitation: Mobility: Walking and moving around  Mobility: Walking and Moving Around Current Status ():  At least 60 percent but less than 80 percent impaired, limited or

## 2018-12-18 ENCOUNTER — ANESTHESIA (OUTPATIENT)
Dept: OPERATING ROOM | Age: 73
DRG: 643 | End: 2018-12-18
Payer: MEDICARE

## 2018-12-18 LAB
ANION GAP SERPL CALCULATED.3IONS-SCNC: 11 MMOL/L (ref 9–17)
ANION GAP SERPL CALCULATED.3IONS-SCNC: 12 MMOL/L (ref 9–17)
ANION GAP SERPL CALCULATED.3IONS-SCNC: 13 MMOL/L (ref 9–17)
ANION GAP SERPL CALCULATED.3IONS-SCNC: 13 MMOL/L (ref 9–17)
BUN BLDV-MCNC: 8 MG/DL (ref 8–23)
BUN/CREAT BLD: 20 (ref 9–20)
CALCIUM IONIZED: 1.14 MMOL/L (ref 1.13–1.33)
CALCIUM SERPL-MCNC: 7.7 MG/DL (ref 8.6–10.4)
CHLORIDE BLD-SCNC: 87 MMOL/L (ref 98–107)
CHLORIDE BLD-SCNC: 89 MMOL/L (ref 98–107)
CHLORIDE BLD-SCNC: 93 MMOL/L (ref 98–107)
CHLORIDE BLD-SCNC: 97 MMOL/L (ref 98–107)
CO2: 20 MMOL/L (ref 20–31)
CO2: 20 MMOL/L (ref 20–31)
CO2: 21 MMOL/L (ref 20–31)
CO2: 21 MMOL/L (ref 20–31)
CREAT SERPL-MCNC: 0.4 MG/DL (ref 0.5–0.9)
GFR AFRICAN AMERICAN: >60 ML/MIN
GFR NON-AFRICAN AMERICAN: >60 ML/MIN
GFR SERPL CREATININE-BSD FRML MDRD: ABNORMAL ML/MIN/{1.73_M2}
GFR SERPL CREATININE-BSD FRML MDRD: ABNORMAL ML/MIN/{1.73_M2}
GLUCOSE BLD-MCNC: 151 MG/DL (ref 65–105)
GLUCOSE BLD-MCNC: 178 MG/DL (ref 65–105)
GLUCOSE BLD-MCNC: 193 MG/DL (ref 70–99)
GLUCOSE BLD-MCNC: 250 MG/DL (ref 65–105)
PHOSPHORUS: 2.7 MG/DL (ref 2.6–4.5)
POTASSIUM SERPL-SCNC: 3.2 MMOL/L (ref 3.7–5.3)
POTASSIUM SERPL-SCNC: 3.2 MMOL/L (ref 3.7–5.3)
POTASSIUM SERPL-SCNC: 3.3 MMOL/L (ref 3.7–5.3)
POTASSIUM SERPL-SCNC: 3.3 MMOL/L (ref 3.7–5.3)
SODIUM BLD-SCNC: 120 MMOL/L (ref 135–144)
SODIUM BLD-SCNC: 122 MMOL/L (ref 135–144)
SODIUM BLD-SCNC: 126 MMOL/L (ref 135–144)
SODIUM BLD-SCNC: 129 MMOL/L (ref 135–144)

## 2018-12-18 PROCEDURE — 80051 ELECTROLYTE PANEL: CPT

## 2018-12-18 PROCEDURE — 6360000002 HC RX W HCPCS: Performed by: INTERNAL MEDICINE

## 2018-12-18 PROCEDURE — 82330 ASSAY OF CALCIUM: CPT

## 2018-12-18 PROCEDURE — 36415 COLL VENOUS BLD VENIPUNCTURE: CPT

## 2018-12-18 PROCEDURE — 2500000003 HC RX 250 WO HCPCS: Performed by: INTERNAL MEDICINE

## 2018-12-18 PROCEDURE — 2060000000 HC ICU INTERMEDIATE R&B

## 2018-12-18 PROCEDURE — 82947 ASSAY GLUCOSE BLOOD QUANT: CPT

## 2018-12-18 PROCEDURE — 80048 BASIC METABOLIC PNL TOTAL CA: CPT

## 2018-12-18 PROCEDURE — 99232 SBSQ HOSP IP/OBS MODERATE 35: CPT | Performed by: INTERNAL MEDICINE

## 2018-12-18 PROCEDURE — 2580000003 HC RX 258: Performed by: INTERNAL MEDICINE

## 2018-12-18 PROCEDURE — 6370000000 HC RX 637 (ALT 250 FOR IP): Performed by: INTERNAL MEDICINE

## 2018-12-18 RX ORDER — SODIUM CHLORIDE AND POTASSIUM CHLORIDE .9; .15 G/100ML; G/100ML
SOLUTION INTRAVENOUS CONTINUOUS
Status: DISCONTINUED | OUTPATIENT
Start: 2018-12-18 | End: 2018-12-19

## 2018-12-18 RX ORDER — 0.9 % SODIUM CHLORIDE 0.9 %
500 INTRAVENOUS SOLUTION INTRAVENOUS ONCE
Status: COMPLETED | OUTPATIENT
Start: 2018-12-18 | End: 2018-12-18

## 2018-12-18 RX ADMIN — Medication 10 ML: at 20:31

## 2018-12-18 RX ADMIN — POTASSIUM CHLORIDE AND SODIUM CHLORIDE: 900; 150 INJECTION, SOLUTION INTRAVENOUS at 09:41

## 2018-12-18 RX ADMIN — FOLIC ACID 1 MG: 1 TABLET ORAL at 12:50

## 2018-12-18 RX ADMIN — GABAPENTIN 300 MG: 300 CAPSULE ORAL at 20:22

## 2018-12-18 RX ADMIN — INSULIN LISPRO 6 UNITS: 100 INJECTION, SOLUTION INTRAVENOUS; SUBCUTANEOUS at 17:53

## 2018-12-18 RX ADMIN — INSULIN LISPRO 1 UNITS: 100 INJECTION, SOLUTION INTRAVENOUS; SUBCUTANEOUS at 21:43

## 2018-12-18 RX ADMIN — PROBIOTIC PRODUCT - TAB 1 TABLET: TAB at 20:22

## 2018-12-18 RX ADMIN — ENOXAPARIN SODIUM 40 MG: 40 INJECTION SUBCUTANEOUS at 12:50

## 2018-12-18 RX ADMIN — INSULIN LISPRO 2 UNITS: 100 INJECTION, SOLUTION INTRAVENOUS; SUBCUTANEOUS at 12:50

## 2018-12-18 RX ADMIN — SODIUM CHLORIDE 500 ML: 9 INJECTION, SOLUTION INTRAVENOUS at 00:14

## 2018-12-18 RX ADMIN — METOPROLOL TARTRATE 50 MG: 50 TABLET ORAL at 20:23

## 2018-12-18 RX ADMIN — FOLIC ACID: 5 INJECTION, SOLUTION INTRAMUSCULAR; INTRAVENOUS; SUBCUTANEOUS at 17:53

## 2018-12-18 RX ADMIN — FOLIC ACID 1 MG: 1 TABLET ORAL at 20:22

## 2018-12-18 RX ADMIN — TOLVAPTAN 15 MG: 15 TABLET ORAL at 13:58

## 2018-12-18 RX ADMIN — FOLIC ACID 1 MG: 1 TABLET ORAL at 17:53

## 2018-12-18 RX ADMIN — POTASSIUM CHLORIDE AND SODIUM CHLORIDE: 900; 150 INJECTION, SOLUTION INTRAVENOUS at 09:53

## 2018-12-18 NOTE — PROGRESS NOTES
Physical Therapy  DATE: 2018    NAME: Viridiana Hernandes  MRN: [id number]   : 1945    Patient not seen this date for Physical Therapy due to:  [] Blood transfusion in progress  [] Cancel by RN  [] Hemodialysis  []  Refusal by Patient   [] Spine Precautions   [] Strict Bedrest  [] Surgery  [] Testing      [x] Other (Patient very agitated this morning and not being cooperative with RN, will attempt on  for a treatment)         [] PT being discontinued at this time. Patient independent. No further needs. [] PT being discontinued at this time as the patient has been transferred to hospice care. No further needs.     Michelle Cantu, PTA

## 2018-12-18 NOTE — PROGRESS NOTES
Net             1938 ml     Patient Vitals for the past 96 hrs (Last 3 readings):   Weight   12/16/18 0312 116 lb 4.8 oz (52.8 kg)   12/16/18 0103 116 lb 6.4 oz (52.8 kg)   12/15/18 2218 125 lb (56.7 kg)       Constitutional:  Alert, awake, no apparent distress  Cardiovascular:  S1, S2 without m/r/g  Respiratory:  CTA B without w/r/r  Abdomen: +bs, soft, nt  Ext:  LE edema    Data/  Recent Labs      12/15/18   2226   WBC  8.7   HGB  10.1*   HCT  29.0*   MCV  91.7   PLT  163     Recent Labs      12/16/18   0139  12/16/18   0646   12/17/18   0555  12/17/18   1147   12/17/18   2352  12/18/18   0407  12/18/18   1142   NA   --   126*   < >  127*  124*   < >  120*  122*  126*   K   --   3.6*   < >  3.1*  3.7   < >  3.2*  3.3*  3.2*   CL   --   87*   < >  90*  89*   < >  87*  89*  93*   CO2   --   20   < >  21  18*   < >  20  20  21   GLUCOSE   --   212*   --   211*   --    --    --   193*   --    PHOS   --    --    --    --   0.9*   --   2.7   --    --    MG  1.4*   --    --    --   1.2*   --    --    --    --    BUN   --   13   --   9   --    --    --   8   --    CREATININE   --   0.44*   --   <0.40*   --    --    --   0.40*   --    LABGLOM   --   >60   --   CANNOT BE CALCULATED   --    --    --   >60   --    GFRAA   --   >60   --   CANNOT BE CALCULATED   --    --    --   >60   --     < > = values in this interval not displayed. Assessment/Plan: 1. Hyponatremia - Acute on chronic and suspect underlying SIADH with superimposed hypovolemic hyponatremia. Will consider holding citalopram.  Fused tolvaptan yesterday. Plan: IV fluids 0.9 normal saline with 20 mEq of potassium chloride per liter to run at 125 mL/h. Check serum sodium every 6 hours. 2.  Chest CT with IV contrast showed a 0.8 pulmonary nodule in the  right lobe. Given smoking history, will follow up with radiologic studies. 3.  Hypokalemia - Poor oral intake.   Correction of hypokalemia will aid correction of hyponatremia.     4.  Protein energy malnutrition - Encourage oral intake. Patient has been considered for another EGD to evaluate upper GI lesion     5. History of seizure disorder - Increased risk of recurrencewith hyponatremia.     6.  Essential hypertension - Controlled. Importance of compliance was emphasized to the patient.       Wendy Leal  Attending Clinical Nephrologist

## 2018-12-18 NOTE — CONSULTS
Psychiatry consultation. Chart reviewed and patient interviewed. Discussed with attending physician also. This is a 77-year-old white female patient who was admitted on 15 December 2018 because of hyponatremia and weakness. She has been getting quite paranoid and agitated and became quite combative last night. She has history of quite extensive alcohol abuse. She denies any history of any psychiatric problems. She reports that she lives alone though was living with her boyfriend for 13 years still he  on and a half years ago. She states that she was  for 23 years and has been  many years. She has 1 adult son. BP (!) 117/56   Pulse 86   Temp 97.2 °F (36.2 °C) (Oral)   Resp 18   Ht 5' 2\" (1.575 m)   Wt 116 lb 4.8 oz (52.8 kg)   SpO2 100%   BMI 21.27 kg/m²            On mental status examination She is of average height and built, cooperative but poorly informative as she is somewhat irritable and labile her affect is superficial and mood is labile. Thought process is poorly productive showing some looseness of association and circumstantiality. She denies any feelings of hopelessness or helplessness, suicidal or homicidal feelings, delusions, ideas of reference or hallucinations. She is alert but her orientation is slightly impaired. Her memory for recent as well as remote events seems fair. Intellectually she is average. She has poor judgment and no insight. Diagnostic impression  : Delirium due to alcohol withdrawal.    Will manage her  psych. Medication and provide supportive therapy as well as develop insight. Side effects of medications reviewed and medication education provided. Discussed with the nursing staff also.

## 2018-12-19 PROBLEM — E46 PROTEIN-CALORIE MALNUTRITION (HCC): Status: ACTIVE | Noted: 2018-12-19

## 2018-12-19 LAB
ANION GAP SERPL CALCULATED.3IONS-SCNC: 11 MMOL/L (ref 9–17)
ANION GAP SERPL CALCULATED.3IONS-SCNC: 11 MMOL/L (ref 9–17)
ANION GAP SERPL CALCULATED.3IONS-SCNC: 13 MMOL/L (ref 9–17)
ANION GAP SERPL CALCULATED.3IONS-SCNC: 14 MMOL/L (ref 9–17)
BUN BLDV-MCNC: 5 MG/DL (ref 8–23)
BUN/CREAT BLD: ABNORMAL (ref 9–20)
CALCIUM SERPL-MCNC: 7.7 MG/DL (ref 8.6–10.4)
CHLORIDE BLD-SCNC: 101 MMOL/L (ref 98–107)
CHLORIDE BLD-SCNC: 103 MMOL/L (ref 98–107)
CHLORIDE BLD-SCNC: 105 MMOL/L (ref 98–107)
CHLORIDE BLD-SCNC: 106 MMOL/L (ref 98–107)
CO2: 19 MMOL/L (ref 20–31)
CO2: 21 MMOL/L (ref 20–31)
CO2: 21 MMOL/L (ref 20–31)
CO2: 22 MMOL/L (ref 20–31)
CREAT SERPL-MCNC: <0.4 MG/DL (ref 0.5–0.9)
GFR AFRICAN AMERICAN: ABNORMAL ML/MIN
GFR NON-AFRICAN AMERICAN: ABNORMAL ML/MIN
GFR SERPL CREATININE-BSD FRML MDRD: ABNORMAL ML/MIN/{1.73_M2}
GFR SERPL CREATININE-BSD FRML MDRD: ABNORMAL ML/MIN/{1.73_M2}
GLUCOSE BLD-MCNC: 152 MG/DL (ref 70–99)
GLUCOSE BLD-MCNC: 153 MG/DL (ref 65–105)
GLUCOSE BLD-MCNC: 195 MG/DL (ref 65–105)
GLUCOSE BLD-MCNC: 220 MG/DL (ref 65–105)
GLUCOSE BLD-MCNC: 234 MG/DL (ref 65–105)
MAGNESIUM: 1.9 MG/DL (ref 1.6–2.6)
POTASSIUM SERPL-SCNC: 3.2 MMOL/L (ref 3.7–5.3)
POTASSIUM SERPL-SCNC: 3.5 MMOL/L (ref 3.7–5.3)
POTASSIUM SERPL-SCNC: 3.6 MMOL/L (ref 3.7–5.3)
POTASSIUM SERPL-SCNC: 4 MMOL/L (ref 3.7–5.3)
SODIUM BLD-SCNC: 135 MMOL/L (ref 135–144)
SODIUM BLD-SCNC: 136 MMOL/L (ref 135–144)
SODIUM BLD-SCNC: 138 MMOL/L (ref 135–144)
SODIUM BLD-SCNC: 138 MMOL/L (ref 135–144)

## 2018-12-19 PROCEDURE — 80048 BASIC METABOLIC PNL TOTAL CA: CPT

## 2018-12-19 PROCEDURE — 2580000003 HC RX 258: Performed by: INTERNAL MEDICINE

## 2018-12-19 PROCEDURE — 97110 THERAPEUTIC EXERCISES: CPT

## 2018-12-19 PROCEDURE — 2500000003 HC RX 250 WO HCPCS: Performed by: INTERNAL MEDICINE

## 2018-12-19 PROCEDURE — 99232 SBSQ HOSP IP/OBS MODERATE 35: CPT | Performed by: INTERNAL MEDICINE

## 2018-12-19 PROCEDURE — 2060000000 HC ICU INTERMEDIATE R&B

## 2018-12-19 PROCEDURE — 97530 THERAPEUTIC ACTIVITIES: CPT

## 2018-12-19 PROCEDURE — 83735 ASSAY OF MAGNESIUM: CPT

## 2018-12-19 PROCEDURE — 80051 ELECTROLYTE PANEL: CPT

## 2018-12-19 PROCEDURE — 6360000002 HC RX W HCPCS: Performed by: INTERNAL MEDICINE

## 2018-12-19 PROCEDURE — 6370000000 HC RX 637 (ALT 250 FOR IP): Performed by: INTERNAL MEDICINE

## 2018-12-19 PROCEDURE — 82947 ASSAY GLUCOSE BLOOD QUANT: CPT

## 2018-12-19 PROCEDURE — 36415 COLL VENOUS BLD VENIPUNCTURE: CPT

## 2018-12-19 RX ORDER — NICOTINE POLACRILEX 4 MG
15 LOZENGE BUCCAL PRN
Status: DISCONTINUED | OUTPATIENT
Start: 2018-12-19 | End: 2018-12-21 | Stop reason: HOSPADM

## 2018-12-19 RX ORDER — SODIUM CHLORIDE 450 MG/100ML
INJECTION, SOLUTION INTRAVENOUS CONTINUOUS
Status: DISCONTINUED | OUTPATIENT
Start: 2018-12-19 | End: 2018-12-20

## 2018-12-19 RX ORDER — DEXTROSE MONOHYDRATE 50 MG/ML
100 INJECTION, SOLUTION INTRAVENOUS PRN
Status: DISCONTINUED | OUTPATIENT
Start: 2018-12-19 | End: 2018-12-21 | Stop reason: HOSPADM

## 2018-12-19 RX ORDER — DEXTROSE MONOHYDRATE 25 G/50ML
12.5 INJECTION, SOLUTION INTRAVENOUS PRN
Status: DISCONTINUED | OUTPATIENT
Start: 2018-12-19 | End: 2018-12-21 | Stop reason: HOSPADM

## 2018-12-19 RX ADMIN — POTASSIUM CHLORIDE AND SODIUM CHLORIDE: 900; 150 INJECTION, SOLUTION INTRAVENOUS at 04:07

## 2018-12-19 RX ADMIN — GABAPENTIN 300 MG: 300 CAPSULE ORAL at 20:21

## 2018-12-19 RX ADMIN — FOLIC ACID 1 MG: 1 TABLET ORAL at 20:21

## 2018-12-19 RX ADMIN — PANTOPRAZOLE SODIUM 40 MG: 40 TABLET, DELAYED RELEASE ORAL at 05:54

## 2018-12-19 RX ADMIN — POTASSIUM CHLORIDE 40 MEQ: 40 SOLUTION ORAL at 00:58

## 2018-12-19 RX ADMIN — Medication 10 ML: at 20:22

## 2018-12-19 RX ADMIN — POTASSIUM CHLORIDE 40 MEQ: 20 TABLET, EXTENDED RELEASE ORAL at 20:27

## 2018-12-19 RX ADMIN — FOLIC ACID: 5 INJECTION, SOLUTION INTRAMUSCULAR; INTRAVENOUS; SUBCUTANEOUS at 17:41

## 2018-12-19 RX ADMIN — METOPROLOL TARTRATE 50 MG: 50 TABLET ORAL at 09:37

## 2018-12-19 RX ADMIN — PROBIOTIC PRODUCT - TAB 1 TABLET: TAB at 20:21

## 2018-12-19 RX ADMIN — METOPROLOL TARTRATE 50 MG: 50 TABLET ORAL at 20:21

## 2018-12-19 RX ADMIN — GABAPENTIN 300 MG: 300 CAPSULE ORAL at 09:36

## 2018-12-19 RX ADMIN — ENOXAPARIN SODIUM 40 MG: 40 INJECTION SUBCUTANEOUS at 09:37

## 2018-12-19 RX ADMIN — ISOSORBIDE MONONITRATE 30 MG: 30 TABLET ORAL at 09:36

## 2018-12-19 RX ADMIN — INSULIN LISPRO 4 UNITS: 100 INJECTION, SOLUTION INTRAVENOUS; SUBCUTANEOUS at 13:17

## 2018-12-19 RX ADMIN — SODIUM CHLORIDE: 4.5 INJECTION, SOLUTION INTRAVENOUS at 08:47

## 2018-12-19 RX ADMIN — LOSARTAN POTASSIUM 25 MG: 25 TABLET ORAL at 09:36

## 2018-12-19 RX ADMIN — INSULIN LISPRO 1 UNITS: 100 INJECTION, SOLUTION INTRAVENOUS; SUBCUTANEOUS at 21:52

## 2018-12-19 ASSESSMENT — PAIN SCALES - GENERAL: PAINLEVEL_OUTOF10: 0

## 2018-12-19 NOTE — PROGRESS NOTES
input(s): PTT in the last 72 hours. Radiology Review:        ASSESSMENT:  Principal Problem:    Hyponatremia  Active Problems:    Hypokalemia    Generalized weakness    Normocytic normochromic anemia    History of fall    Esophageal dysphagia    Unintentional weight loss  Resolved Problems:    * No resolved hospital problems. *      The conditions that I am treating are Stable and show no change    PLAN :  1. Patient may need EGD and this will be arranged for tomorrow if she is stable. 2. Discussed with pt and nursing staff. Thank you for allowing me to participate in the care of your patient. Please feel free to contact me with any concerns. [phone]    Tyesha Michele MD    Note is dictated utilizing voice recognition software. Unfortunately this leads to occasional typographical errors. Please contact our office if you have any questions.

## 2018-12-19 NOTE — PROGRESS NOTES
Resting comfortably at this time. Electrolytes better than baseline. Specialty notes/recommendations noted. Continuing current regimen.

## 2018-12-19 NOTE — PROGRESS NOTES
Physical Therapy  Facility/Department: [facility] PROGRESSIVE CARE  Daily Treatment Note  NAME: Waldo Quesada  : 1945  MRN: [id number]    Date of Service: 2018    Discharge Recommendations:  [address]        Patient Diagnosis(es): The encounter diagnosis was Hyponatremia. has a past medical history of Seizures ([facility] [address] has a past surgical history that includes Cardiac surgery; Upper gastrointestinal endoscopy (2018); and pr esophagogastroduodenoscopy transoral diagnostic (N/A, 2018). Restrictions  Restrictions/Precautions  Restrictions/Precautions: General Precautions, Fall Risk  Required Braces or Orthoses?: No  Subjective   General  Chart Reviewed: Yes  Response To Previous Treatment: Patient with no complaints from previous session. Family / Caregiver Present: No  Subjective  Subjective: Patient agrees to PT  General Comment  Comments: RN, reported patient is somewhat confused but is not being agressive today . Pain Screening  Patient Currently in Pain: Denies  Pain Assessment  Pain Level: 0  Vital Signs  Patient Currently in Pain: Denies  Oxygen Therapy  O2 Device: None (Room air)       Objective   Bed mobility  Bridging: Minimal assistance  Scooting: Minimal assistance     Ambulation  Ambulation?: No (refused)        Exercises  Comments: Supine exercises x 3-5 reps, patient is hard to keep on task and would get annoyed with writer when repeating exercises. Comment: Patient at EOB post exercises with Min A x 1 for rolling, scooting and supine to sit. As writer assisted patient with extra gown to cover back, patient reports she has to use the rest room and laid back down SBA, when asked to use the bed pan she reports \"no\". After a few mins, writer asked patient if she was ready to be cleaned up. Patient told [facility], this is just not gonna happen. \" when asked why and that writer would like to make sure patient was cleaned up prior to

## 2018-12-19 NOTE — PROGRESS NOTES
12/16/18 0312 116 lb 4.8 oz (52.8 kg)   12/16/18 0103 116 lb 6.4 oz (52.8 kg)   12/15/18 2218 125 lb (56.7 kg)       Constitutional:  Alert, awake, no apparent distress  Cardiovascular:  S1, S2 without m/r/g  Respiratory:  CTA B without w/r/r  Abdomen: +bs, soft, nt  Ext:  LE edema    Data/  No results for input(s): WBC, HGB, HCT, MCV, PLT in the last 72 hours. Recent Labs      12/17/18   0555  12/17/18   1147   12/17/18   2352  12/18/18   0407   12/19/18   0018  12/19/18   0409  12/19/18   1141   NA  127*  124*   < >  120*  122*   < >  138  138  136   K  3.1*  3.7   < >  3.2*  3.3*   < >  3.2*  4.0  3.6*   CL  90*  89*   < >  87*  89*   < >  105  106  103   CO2  21  18*   < >  20  20   < >  22  21  19*   GLUCOSE  211*   --    --    --   193*   --    --   152*   --    PHOS   --   0.9*   --   2.7   --    --    --    --    --    MG   --   1.2*   --    --    --    --    --    --   1.9   BUN  9   --    --    --   8   --    --   5*   --    CREATININE  <0.40*   --    --    --   0.40*   --    --   <0.40*   --    LABGLOM  CANNOT BE CALCULATED   --    --    --   >60   --    --   CANNOT BE CALCULATED   --    GFRAA  CANNOT BE CALCULATED   --    --    --   >60   --    --   CANNOT BE CALCULATED   --     < > = values in this interval not displayed. Assessment/Plan:     1. Hyponatremia - Acute on chronic and suspect underlying SIADH with superimposed hypovolemic hyponatremia. Serum sodium increased from 129 mmol/L at 5 pm yesterday to 138 mmol/L at 4 am today [11 hours]. Plan: Change IV fluid to 0.45 normal saline at 75 mL/h. Continue to check serum sodium every 6 hours. Will continue citalopram for now. 2.  Chest CT with IV contrast showed a 0.8 pulmonary nodule in the  right lobe. Given smoking history, will follow up with radiologic studies. 3.  Hypokalemia - Poor oral intake. Correction of hypokalemia will aid correction of hyponatremia.     4.  Protein energy malnutrition - Encourage oral intake.

## 2018-12-20 VITALS
SYSTOLIC BLOOD PRESSURE: 129 MMHG | OXYGEN SATURATION: 100 % | DIASTOLIC BLOOD PRESSURE: 57 MMHG | RESPIRATION RATE: 16 BRPM

## 2018-12-20 PROBLEM — F10.11 HISTORY OF ALCOHOL ABUSE: Status: ACTIVE | Noted: 2018-12-20

## 2018-12-20 PROBLEM — K20.90 ESOPHAGITIS DETERMINED BY ENDOSCOPY: Status: ACTIVE | Noted: 2018-12-20

## 2018-12-20 LAB
ANION GAP SERPL CALCULATED.3IONS-SCNC: 11 MMOL/L (ref 9–17)
ANION GAP SERPL CALCULATED.3IONS-SCNC: 12 MMOL/L (ref 9–17)
ANION GAP SERPL CALCULATED.3IONS-SCNC: 13 MMOL/L (ref 9–17)
ANION GAP SERPL CALCULATED.3IONS-SCNC: 13 MMOL/L (ref 9–17)
BUN BLDV-MCNC: 6 MG/DL (ref 8–23)
BUN/CREAT BLD: ABNORMAL (ref 9–20)
CALCIUM SERPL-MCNC: 7.8 MG/DL (ref 8.6–10.4)
CHLORIDE BLD-SCNC: 101 MMOL/L (ref 98–107)
CHLORIDE BLD-SCNC: 102 MMOL/L (ref 98–107)
CHLORIDE BLD-SCNC: 102 MMOL/L (ref 98–107)
CHLORIDE BLD-SCNC: 104 MMOL/L (ref 98–107)
CO2: 19 MMOL/L (ref 20–31)
CO2: 20 MMOL/L (ref 20–31)
CREAT SERPL-MCNC: <0.4 MG/DL (ref 0.5–0.9)
GFR AFRICAN AMERICAN: ABNORMAL ML/MIN
GFR NON-AFRICAN AMERICAN: ABNORMAL ML/MIN
GFR SERPL CREATININE-BSD FRML MDRD: ABNORMAL ML/MIN/{1.73_M2}
GFR SERPL CREATININE-BSD FRML MDRD: ABNORMAL ML/MIN/{1.73_M2}
GLUCOSE BLD-MCNC: 146 MG/DL (ref 65–105)
GLUCOSE BLD-MCNC: 164 MG/DL (ref 70–99)
GLUCOSE BLD-MCNC: 193 MG/DL (ref 65–105)
GLUCOSE BLD-MCNC: 208 MG/DL (ref 65–105)
LIPASE: 39 U/L (ref 13–60)
POTASSIUM SERPL-SCNC: 3.5 MMOL/L (ref 3.7–5.3)
POTASSIUM SERPL-SCNC: 3.5 MMOL/L (ref 3.7–5.3)
POTASSIUM SERPL-SCNC: 3.6 MMOL/L (ref 3.7–5.3)
POTASSIUM SERPL-SCNC: 3.7 MMOL/L (ref 3.7–5.3)
SODIUM BLD-SCNC: 133 MMOL/L (ref 135–144)
SODIUM BLD-SCNC: 134 MMOL/L (ref 135–144)
SODIUM BLD-SCNC: 135 MMOL/L (ref 135–144)
SODIUM BLD-SCNC: 135 MMOL/L (ref 135–144)

## 2018-12-20 PROCEDURE — 2580000003 HC RX 258: Performed by: INTERNAL MEDICINE

## 2018-12-20 PROCEDURE — 2709999900 HC NON-CHARGEABLE SUPPLY: Performed by: INTERNAL MEDICINE

## 2018-12-20 PROCEDURE — 0DB58ZX EXCISION OF ESOPHAGUS, VIA NATURAL OR ARTIFICIAL OPENING ENDOSCOPIC, DIAGNOSTIC: ICD-10-PCS | Performed by: INTERNAL MEDICINE

## 2018-12-20 PROCEDURE — 88305 TISSUE EXAM BY PATHOLOGIST: CPT

## 2018-12-20 PROCEDURE — 43239 EGD BIOPSY SINGLE/MULTIPLE: CPT | Performed by: INTERNAL MEDICINE

## 2018-12-20 PROCEDURE — 82947 ASSAY GLUCOSE BLOOD QUANT: CPT

## 2018-12-20 PROCEDURE — 7100000000 HC PACU RECOVERY - FIRST 15 MIN: Performed by: INTERNAL MEDICINE

## 2018-12-20 PROCEDURE — 7100000001 HC PACU RECOVERY - ADDTL 15 MIN: Performed by: INTERNAL MEDICINE

## 2018-12-20 PROCEDURE — 3700000000 HC ANESTHESIA ATTENDED CARE: Performed by: INTERNAL MEDICINE

## 2018-12-20 PROCEDURE — 6370000000 HC RX 637 (ALT 250 FOR IP): Performed by: INTERNAL MEDICINE

## 2018-12-20 PROCEDURE — 88342 IMHCHEM/IMCYTCHM 1ST ANTB: CPT

## 2018-12-20 PROCEDURE — 2060000000 HC ICU INTERMEDIATE R&B

## 2018-12-20 PROCEDURE — 6360000002 HC RX W HCPCS: Performed by: NURSE ANESTHETIST, CERTIFIED REGISTERED

## 2018-12-20 PROCEDURE — 2500000003 HC RX 250 WO HCPCS: Performed by: NURSE ANESTHETIST, CERTIFIED REGISTERED

## 2018-12-20 PROCEDURE — 2580000003 HC RX 258: Performed by: NURSE ANESTHETIST, CERTIFIED REGISTERED

## 2018-12-20 PROCEDURE — 80048 BASIC METABOLIC PNL TOTAL CA: CPT

## 2018-12-20 PROCEDURE — 36415 COLL VENOUS BLD VENIPUNCTURE: CPT

## 2018-12-20 PROCEDURE — 3609012400 HC EGD TRANSORAL BIOPSY SINGLE/MULTIPLE: Performed by: INTERNAL MEDICINE

## 2018-12-20 PROCEDURE — 3700000001 HC ADD 15 MINUTES (ANESTHESIA): Performed by: INTERNAL MEDICINE

## 2018-12-20 PROCEDURE — 83690 ASSAY OF LIPASE: CPT

## 2018-12-20 PROCEDURE — 80051 ELECTROLYTE PANEL: CPT

## 2018-12-20 PROCEDURE — 88341 IMHCHEM/IMCYTCHM EA ADD ANTB: CPT

## 2018-12-20 PROCEDURE — 95816 EEG AWAKE AND DROWSY: CPT

## 2018-12-20 RX ORDER — LIDOCAINE HYDROCHLORIDE 20 MG/ML
INJECTION, SOLUTION EPIDURAL; INFILTRATION; INTRACAUDAL; PERINEURAL PRN
Status: DISCONTINUED | OUTPATIENT
Start: 2018-12-20 | End: 2018-12-20 | Stop reason: SDUPTHER

## 2018-12-20 RX ORDER — PANTOPRAZOLE SODIUM 40 MG/1
40 TABLET, DELAYED RELEASE ORAL
Status: DISCONTINUED | OUTPATIENT
Start: 2018-12-21 | End: 2018-12-21 | Stop reason: HOSPADM

## 2018-12-20 RX ORDER — PROPOFOL 10 MG/ML
INJECTION, EMULSION INTRAVENOUS PRN
Status: DISCONTINUED | OUTPATIENT
Start: 2018-12-20 | End: 2018-12-20 | Stop reason: SDUPTHER

## 2018-12-20 RX ORDER — SODIUM CHLORIDE 9 MG/ML
INJECTION, SOLUTION INTRAVENOUS CONTINUOUS PRN
Status: DISCONTINUED | OUTPATIENT
Start: 2018-12-20 | End: 2018-12-20 | Stop reason: SDUPTHER

## 2018-12-20 RX ADMIN — GABAPENTIN 300 MG: 300 CAPSULE ORAL at 22:33

## 2018-12-20 RX ADMIN — SODIUM CHLORIDE: 9 INJECTION, SOLUTION INTRAVENOUS at 08:45

## 2018-12-20 RX ADMIN — Medication 10 ML: at 22:40

## 2018-12-20 RX ADMIN — INSULIN LISPRO 2 UNITS: 100 INJECTION, SOLUTION INTRAVENOUS; SUBCUTANEOUS at 22:33

## 2018-12-20 RX ADMIN — PROPOFOL 30 MG: 10 INJECTION, EMULSION INTRAVENOUS at 08:55

## 2018-12-20 RX ADMIN — PROPOFOL 30 MG: 10 INJECTION, EMULSION INTRAVENOUS at 08:53

## 2018-12-20 RX ADMIN — LIDOCAINE HYDROCHLORIDE 100 MG: 20 INJECTION, SOLUTION EPIDURAL; INFILTRATION; INTRACAUDAL; PERINEURAL at 08:53

## 2018-12-20 RX ADMIN — PROPOFOL 20 MG: 10 INJECTION, EMULSION INTRAVENOUS at 09:00

## 2018-12-20 RX ADMIN — FOLIC ACID 1 MG: 1 TABLET ORAL at 22:33

## 2018-12-20 RX ADMIN — GABAPENTIN 300 MG: 300 CAPSULE ORAL at 14:33

## 2018-12-20 RX ADMIN — PROBIOTIC PRODUCT - TAB 1 TABLET: TAB at 22:33

## 2018-12-20 RX ADMIN — POTASSIUM CHLORIDE 40 MEQ: 40 SOLUTION ORAL at 05:03

## 2018-12-20 RX ADMIN — METOPROLOL TARTRATE 50 MG: 50 TABLET ORAL at 22:33

## 2018-12-20 ASSESSMENT — PULMONARY FUNCTION TESTS
PIF_VALUE: 1

## 2018-12-20 ASSESSMENT — PAIN SCALES - GENERAL: PAINLEVEL_OUTOF10: 0

## 2018-12-20 NOTE — OP NOTE
were verified and the scope was removed. The patient has tolerated the procedure and conscious sedation without unusual events. Recommendations/Plan:   1. F/U Biopsies  2. F/U In Office as instructed  3.  Discussed with the family                   Electronically signed by Alyson Dubose MD  on 12/20/2018 at 9:12 AM

## 2018-12-20 NOTE — PROGRESS NOTES
Hemalatha Rahman is a 72 y.o. female patient.     Current Facility-Administered Medications   Medication Dose Route Frequency Provider Last Rate Last Dose    0.45 % sodium chloride infusion   Intravenous Continuous Jay Mills MD 75 mL/hr at 12/19/18 0847      glucose (GLUTOSE) 40 % oral gel 15 g  15 g Oral PRN Gail Sanches MD        dextrose 50 % solution 12.5 g  12.5 g Intravenous PRN Gail Sanches MD        glucagon (rDNA) injection 1 mg  1 mg Intramuscular PRN Gail Sanches MD        dextrose 5 % solution  100 mL/hr Intravenous PRN Gail Sanches MD        potassium chloride (KLOR-CON M) extended release tablet 40 mEq  40 mEq Oral PRN Aleksander Simpson MD   40 mEq at 12/19/18 2027    Or    potassium chloride 20 MEQ/15ML (10%) oral solution 40 mEq  40 mEq Oral PRN Aleksander Simpson MD   40 mEq at 12/20/18 0503    Or    potassium chloride 10 mEq/100 mL IVPB (Peripheral Line)  10 mEq Intravenous PRN Aleksander Simpson MD        magnesium sulfate 1 g in dextrose 5% 100 mL IVPB  1 g Intravenous PRN Aleksander Simpson MD   Stopped at 12/17/18 1628    sodium phosphate 8.46 mmol in dextrose 5 % 250 mL IVPB  0.16 mmol/kg Intravenous PRN Aleksander Simpson MD        Or   [place] sodium phosphate 16.89 mmol in dextrose 5 % 250 mL IVPB  0.32 mmol/kg Intravenous PRN Aleksander Simpson MD   Stopped at 12/17/18 2322    sodium chloride flush 0.9 % injection 10 mL  10 mL Intravenous 2 times per day Gail Sanches MD   10 mL at 12/19/18 2022    sodium chloride flush 0.9 % injection 10 mL  10 mL Intravenous PRN Gail Sanches MD        enoxaparin (LOVENOX) injection 40 mg  40 mg Subcutaneous Daily Gail Sanches MD   40 mg at 12/19/18 5686    multivitamin 1 tablet  1 tablet Oral Daily Kenneth Blanco MD        LORazepam (ATIVAN) tablet 1 mg  1 mg Oral Q1H PRN Gail Sanches MD        Or    LORazepam (ATIVAN) injection 1 mg  1 mg

## 2018-12-20 NOTE — PROGRESS NOTES
Type and Reason for Visit: Reassess    Dietitian Assessment of Nutrition Re-Screen: Patient continues to be at risk for malnutrition due to minimal appetite, minimal PO intake (25-50% PO intake recorded 12/18/18) related to problems with swallowing, and stage I pressure ulcer on sacrum. EGD done this date showed esophagitis. Recommend to continue to recieve oral nutrition supplement. Nutrition Risk: High    Nutrition Intervention: Continue with current diet. Continue with oral nutrition supplement.     Electronically signed by Pepper Davis RD, ERWIN on 12/20/18 at 3:06 PM    Contact Number: [phone]

## 2018-12-20 NOTE — PROGRESS NOTES
Nephrology Progress Note    Subjective/     This is a 72 y.o. female with history of active smoking of one pack per day, started smoking at 27years old , seizure disorder who presented with generalized fatigue and weakness for the past 5 days. She fell three  times day prior to admission and had prodromal symptoms of dizziness and lightheadedness. She denied any loss of consciousness with falls. No fever or chills cough, chest pain shortness of breath. She did mention she has been sick to the stomach with nausea but no vomiting and has had aversion to food. She had  EGD several months ago which showed moderate erosive duodenitis and gastritis and and had chronic anemia at that time. On presentation she had a potassium of 2.9 with serum sodium of 123. Her urine osmolarity was 537. She denies headaches, disorientation or recent seizures. He has lost about 20 pounds over 6 months. Patient has been on citalopram at home -denies THIAZIDE diuretics at home. States she has been drinking plenty of water at home. CT chest in June 2018 did show multiple pulmonary nodules with no concerning adenopathy In the past she had serum sodium of 125 in June 2018. Interval history: Patient was seen and examined today s/p EGD today showed esophagitis. SNa improved and stable, over correction reversed. Objective/     Vitals:    12/20/18 0907 12/20/18 0915 12/20/18 0930 12/20/18 1206   BP: (!) 91/37 (!) 99/39 (!) 119/49 (!) 148/72   Pulse: 82 79 80 91   Resp: 20 17 21 16   Temp: 97 °F (36.1 °C)  97.7 °F (36.5 °C) 99 °F (37.2 °C)   TempSrc: Temporal   Oral   SpO2: 100% 100% 100% 98%   Weight:       Height:         24HR INTAKE/OUTPUT:      Intake/Output Summary (Last 24 hours) at 12/20/18 1435  Last data filed at 12/20/18 0931   Gross per 24 hour   Intake             1463 ml   Output                0 ml   Net             1463 ml     No data found.       Constitutional:  Alert, awake, no apparent distress  Cardiovascular: S1, S2 without m/r/g  Respiratory:  CTA B without w/r/r  Abdomen: +bs, soft, nt  Ext:  LE edema    Data/  No results for input(s): WBC, HGB, HCT, MCV, PLT in the last 72 hours. Recent Labs      12/17/18   2352  12/18/18   0407   12/19/18   0409  12/19/18   1141   12/20/18   0043  12/20/18   0406  12/20/18   1215   NA  120*  122*   < >  138  136   < >  135  135  134*   K  3.2*  3.3*   < >  4.0  3.6*   < >  3.6*  3.5*  3.7   CL  87*  89*   < >  106  103   < >  104  102  102   CO2  20  20   < >  21  19*   < >  20  20  19*   GLUCOSE   --   193*   --   152*   --    --    --   164*   --    PHOS  2.7   --    --    --    --    --    --    --    --    MG   --    --    --    --   1.9   --    --    --    --    BUN   --   8   --   5*   --    --    --   6*   --    CREATININE   --   0.40*   --   <0.40*   --    --    --   <0.40*   --    LABGLOM   --   >60   --   CANNOT BE CALCULATED   --    --    --   CANNOT BE CALCULATED   --    GFRAA   --   >60   --   CANNOT BE CALCULATED   --    --    --   CANNOT BE CALCULATED   --     < > = values in this interval not displayed. Assessment/Plan:     1. Hyponatremia - Acute on chronic and suspect underlying SIADH with superimposed hypovolemic hyponatremia. Serum sodium improved remains stable. Over correction controlled. 2.  Chest CT with IV contrast showed a 0.8 pulmonary nodule in the  right lobe. Given smoking history, will follow up with radiologic studies. 3.  Hypokalemia - Poor oral intake. Correction of hypokalemia will aid correction of hyponatremia.     4.  Protein energy malnutrition - Encourage oral intake.       5. History of seizure disorder - No recent seizure documented.     6.  Essential hypertension - Controlled. Importance of compliance was emphasized to the patient. Plan:  DC IVF  Continue fluid restriction.       [facility]    Attending Clinical Nephrologist

## 2018-12-20 NOTE — PROGRESS NOTES
Physical Therapy  DATE: 2018    NAME: Vaibhav Walsh  MRN: [id number]   : 1945    Patient not seen this date for Physical Therapy due to:  [] Blood transfusion in progress  [] Cancel by RN  [] Hemodialysis  [x]  Refusal by Patient (Patient refused to participate but when educated on benefits of PT, she becomes very agitated)   [] Spine Precautions   [] Strict Bedrest  [] Surgery  [] Testing      [] Other        [] PT being discontinued at this time. Patient independent. No further needs. [] PT being discontinued at this time as the patient has been transferred to hospice care. No further needs.     Sadi Calderon, PTA

## 2018-12-20 NOTE — CARE COORDINATION
[facility] received call from Letitia Pedroza with [employer] regarding insurance pre-cert has been approved. SW informed pt RN Xiomara Santiago regarding insurance pre-cert approval, Xiomara Santiago reports the pt will be ready for discharge later today or tomorrow.

## 2018-12-20 NOTE — ANESTHESIA PRE PROCEDURE
mononitrate (IMDUR) extended release tablet 30 mg  30 mg Oral Daily Cayetano Trammell MD   30 mg at 12/19/18 0936    losartan (COZAAR) tablet 25 mg  25 mg Oral Daily Cayetano Trammell MD   25 mg at 12/19/18 0936    metoprolol tartrate (LOPRESSOR) tablet 50 mg  50 mg Oral BID Cayetano Trammell MD   50 mg at 12/19/18 2021    nicotine (NICODERM CQ) 21 MG/24HR 1 patch  1 patch Transdermal Q24H Cayetano Trammell MD   1 patch at 12/20/18 0114    pantoprazole (PROTONIX) tablet 40 mg  40 mg Oral QAM AC Cayetano Trammell MD   Stopped at 12/20/18 0519    vitamin B-1 (THIAMINE) tablet 100 mg  100 mg Oral Daily Cayetano Trammell MD   100 mg at 12/17/18 8887    vitamin B-12 (CYANOCOBALAMIN) tablet 1,000 mcg  1,000 mcg Oral Daily Cayetano Trammell MD   1,000 mcg at 12/17/18 0318    lactobacillus (BACID) tablet 1 tablet  1 tablet Oral BID Cayetano Trammell MD   1 tablet at 12/19/18 2021       Allergies:     Allergies   Allergen Reactions    Penicillins        Problem List:    Patient Active Problem List   Diagnosis Code    Anemia D64.9    Recurrent falls while walking R29.6    PVD (peripheral vascular disease) (HCC) I73.9    Chronic alcohol abuse F10.10    Pulmonary nodules/lesions, multiple R91.8    Tobacco abuse Z72.0    Dizziness R42    Hyponatremia E87.1    Hypokalemia E87.6    Generalized weakness R53.1    Normocytic normochromic anemia D64.9    History of fall Z91.81    Esophageal dysphagia R13.10    Unintentional weight loss R63.4    Protein-calorie malnutrition ([facility] [address] E46       Past Medical History:        Diagnosis Date    Seizures ([address]     pt stated was three years ago       Past Surgical History:        Procedure Laterality Date    CARDIAC SURGERY      stents    SD ESOPHAGOGASTRODUODENOSCOPY TRANSORAL DIAGNOSTIC N/A 6/23/2018    EGD DIAGNOSTIC ONLY performed by Olga Lidia Neumann MD at [address]  06/23/2018       Social History:    Social History

## 2018-12-21 VITALS
WEIGHT: 116.3 LBS | DIASTOLIC BLOOD PRESSURE: 52 MMHG | TEMPERATURE: 99.3 F | RESPIRATION RATE: 22 BRPM | HEIGHT: 62 IN | BODY MASS INDEX: 21.4 KG/M2 | OXYGEN SATURATION: 97 % | SYSTOLIC BLOOD PRESSURE: 121 MMHG | HEART RATE: 79 BPM

## 2018-12-21 PROBLEM — G93.41 ACUTE METABOLIC ENCEPHALOPATHY: Status: ACTIVE | Noted: 2018-12-21

## 2018-12-21 PROBLEM — E22.2 SYNDROME OF INAPPROPRIATE ADH (SIADH) SECRETION (HCC): Status: ACTIVE | Noted: 2018-12-21

## 2018-12-21 LAB
ANION GAP SERPL CALCULATED.3IONS-SCNC: 10 MMOL/L (ref 9–17)
ANION GAP SERPL CALCULATED.3IONS-SCNC: 11 MMOL/L (ref 9–17)
ANION GAP SERPL CALCULATED.3IONS-SCNC: 11 MMOL/L (ref 9–17)
BUN BLDV-MCNC: 5 MG/DL (ref 8–23)
BUN/CREAT BLD: ABNORMAL (ref 9–20)
CALCIUM SERPL-MCNC: 8.1 MG/DL (ref 8.6–10.4)
CHLORIDE BLD-SCNC: 100 MMOL/L (ref 98–107)
CHLORIDE BLD-SCNC: 102 MMOL/L (ref 98–107)
CHLORIDE BLD-SCNC: 103 MMOL/L (ref 98–107)
CO2: 21 MMOL/L (ref 20–31)
CREAT SERPL-MCNC: <0.4 MG/DL (ref 0.5–0.9)
GFR AFRICAN AMERICAN: ABNORMAL ML/MIN
GFR NON-AFRICAN AMERICAN: ABNORMAL ML/MIN
GFR SERPL CREATININE-BSD FRML MDRD: ABNORMAL ML/MIN/{1.73_M2}
GFR SERPL CREATININE-BSD FRML MDRD: ABNORMAL ML/MIN/{1.73_M2}
GLUCOSE BLD-MCNC: 152 MG/DL (ref 65–105)
GLUCOSE BLD-MCNC: 164 MG/DL (ref 70–99)
GLUCOSE BLD-MCNC: 179 MG/DL (ref 65–105)
POTASSIUM SERPL-SCNC: 3.3 MMOL/L (ref 3.7–5.3)
POTASSIUM SERPL-SCNC: 3.8 MMOL/L (ref 3.7–5.3)
POTASSIUM SERPL-SCNC: 4.1 MMOL/L (ref 3.7–5.3)
SODIUM BLD-SCNC: 132 MMOL/L (ref 135–144)
SODIUM BLD-SCNC: 133 MMOL/L (ref 135–144)
SODIUM BLD-SCNC: 135 MMOL/L (ref 135–144)

## 2018-12-21 PROCEDURE — 6370000000 HC RX 637 (ALT 250 FOR IP): Performed by: INTERNAL MEDICINE

## 2018-12-21 PROCEDURE — 2580000003 HC RX 258: Performed by: INTERNAL MEDICINE

## 2018-12-21 PROCEDURE — 82947 ASSAY GLUCOSE BLOOD QUANT: CPT

## 2018-12-21 PROCEDURE — 97530 THERAPEUTIC ACTIVITIES: CPT

## 2018-12-21 PROCEDURE — 6360000002 HC RX W HCPCS: Performed by: INTERNAL MEDICINE

## 2018-12-21 PROCEDURE — 80048 BASIC METABOLIC PNL TOTAL CA: CPT

## 2018-12-21 PROCEDURE — G8988 SELF CARE GOAL STATUS: HCPCS

## 2018-12-21 PROCEDURE — 97535 SELF CARE MNGMENT TRAINING: CPT

## 2018-12-21 PROCEDURE — 36415 COLL VENOUS BLD VENIPUNCTURE: CPT

## 2018-12-21 PROCEDURE — 80051 ELECTROLYTE PANEL: CPT

## 2018-12-21 PROCEDURE — 97110 THERAPEUTIC EXERCISES: CPT

## 2018-12-21 PROCEDURE — G8987 SELF CARE CURRENT STATUS: HCPCS

## 2018-12-21 RX ORDER — ALPRAZOLAM 0.25 MG/1
0.25 TABLET ORAL 2 TIMES DAILY PRN
Qty: 30 TABLET | Refills: 0 | Status: SHIPPED | OUTPATIENT
Start: 2018-12-21 | End: 2019-01-05

## 2018-12-21 RX ORDER — CITALOPRAM 40 MG/1
20 TABLET ORAL DAILY
Qty: 30 TABLET | Refills: 0 | Status: SHIPPED | OUTPATIENT
Start: 2018-12-21

## 2018-12-21 RX ADMIN — CLOPIDOGREL 75 MG: 75 TABLET, FILM COATED ORAL at 09:20

## 2018-12-21 RX ADMIN — CYANOCOBALAMIN TAB 1000 MCG 1000 MCG: 1000 TAB at 09:20

## 2018-12-21 RX ADMIN — METOPROLOL TARTRATE 50 MG: 50 TABLET ORAL at 09:20

## 2018-12-21 RX ADMIN — INSULIN LISPRO 2 UNITS: 100 INJECTION, SOLUTION INTRAVENOUS; SUBCUTANEOUS at 12:43

## 2018-12-21 RX ADMIN — Medication 100 MG: at 09:20

## 2018-12-21 RX ADMIN — PANTOPRAZOLE SODIUM 40 MG: 40 TABLET, DELAYED RELEASE ORAL at 05:41

## 2018-12-21 RX ADMIN — ENOXAPARIN SODIUM 40 MG: 40 INJECTION SUBCUTANEOUS at 09:20

## 2018-12-21 RX ADMIN — FOLIC ACID 1 MG: 1 TABLET ORAL at 09:22

## 2018-12-21 RX ADMIN — ATORVASTATIN CALCIUM 10 MG: 10 TABLET, FILM COATED ORAL at 09:20

## 2018-12-21 RX ADMIN — POTASSIUM CHLORIDE 40 MEQ: 40 SOLUTION ORAL at 01:34

## 2018-12-21 RX ADMIN — Medication 10 ML: at 09:20

## 2018-12-21 RX ADMIN — FOLIC ACID 1 MG: 1 TABLET ORAL at 12:43

## 2018-12-21 RX ADMIN — CITALOPRAM HYDROBROMIDE 20 MG: 20 TABLET ORAL at 09:20

## 2018-12-21 RX ADMIN — ASPIRIN 81 MG: 81 TABLET, COATED ORAL at 09:20

## 2018-12-21 RX ADMIN — GABAPENTIN 300 MG: 300 CAPSULE ORAL at 09:20

## 2018-12-21 RX ADMIN — ISOSORBIDE MONONITRATE 30 MG: 30 TABLET ORAL at 09:20

## 2018-12-21 RX ADMIN — LOSARTAN POTASSIUM 25 MG: 25 TABLET ORAL at 09:20

## 2018-12-21 RX ADMIN — PROBIOTIC PRODUCT - TAB 1 TABLET: TAB at 09:20

## 2018-12-21 RX ADMIN — MULTIVITAMIN TABLET 1 TABLET: TABLET at 09:20

## 2018-12-21 NOTE — PROGRESS NOTES
Training, Transfer Training, Gait Training, Safety Education & Training  Safety Devices  Type of devices: Bed alarm in place, Call light within reach, Left in bed, Nurse notified, All fall risk precautions in place, Gait belt  Restraints  Initially in place: No     Therapy Time   Individual Concurrent Group Co-treatment   Time In 1316         Time Out 1358         Minutes [address], PT

## 2018-12-21 NOTE — PROGRESS NOTES
PRN Zenovia Suresh, MD        Or    LORazepam (ATIVAN) tablet 2 mg  2 mg Oral Q1H PRN Zenovia End, MD        Or    LORazepam (ATIVAN) injection 2 mg  2 mg Intravenous Q1H PRN Zenovia End, MD        Or    LORazepam (ATIVAN) tablet 3 mg  3 mg Oral Q1H PRN Zenovia End, MD        Or    LORazepam (ATIVAN) injection 3 mg  3 mg Intravenous Q1H PRN Zenovia End, MD        Or    LORazepam (ATIVAN) tablet 4 mg  4 mg Oral Q1H PRN Zenovia End, MD        Or    LORazepam (ATIVAN) injection 4 mg  4 mg Intravenous Q1H PRN Zenovia End, MD        ondansetron (ZOFRAN-ODT) disintegrating tablet 4 mg  4 mg Oral Q6H PRN Zenovia End, MD        Or    ondansetron (ZOFRAN) injection 4 mg  4 mg Intravenous Q6H PRN Zenovia End, MD        insulin lispro (HUMALOG) injection vial 0-12 Units  0-12 Units Subcutaneous TID WC Zenovia End, MD   4 Units at 12/19/18 1317    insulin lispro (HUMALOG) injection vial 0-6 Units  0-6 Units Subcutaneous Nightly Zenovia End, MD   2 Units at 12/20/18 2233    aspirin EC tablet 81 mg  81 mg Oral Daily Zenovia End, MD   81 mg at 12/17/18 0928    atorvastatin (LIPITOR) tablet 10 mg  10 mg Oral Daily Zenovia End, MD   10 mg at 12/17/18 0928    citalopram (CELEXA) tablet 20 mg  20 mg Oral Daily Zenovia End, MD   20 mg at 12/17/18 6312    clopidogrel (PLAVIX) tablet 75 mg  75 mg Oral Daily Zenovia End, MD   75 mg at 89/94/61 9546    folic acid (FOLVITE) tablet 1 mg  1 mg Oral 4x daily Zenovia End, MD   1 mg at 12/20/18 2233    gabapentin (NEURONTIN) capsule 300 mg  300 mg Oral TID Zenovia End, MD   300 mg at 12/20/18 2233    isosorbide mononitrate (IMDUR) extended release tablet 30 mg  30 mg Oral Daily Zenovia End, MD   30 mg at 12/19/18 0936    losartan (COZAAR) tablet 25 mg  25 mg Oral Daily Zenovia End, MD   25 mg at 12/19/18 0936    metoprolol tartrate (LOPRESSOR) tablet 50 mg  50 mg Oral

## 2018-12-21 NOTE — DISCHARGE SUMMARY
Discharge Summary    Patient ID: Margo Castillo    MRN: [id number]     Acct:  [de-identified]       Patient's PCP: No primary care provider on file. Admit Date: 12/15/2018     Discharge Date: 12/21/2018      Admitting Physician: Cheryle Lazo MD    Discharge Physician: Marylen Lota, MD     Discharge Diagnoses:  Hyponatremia  Syndrome of inappropriate ADH secretion  Hypokalemia  Acute Metabolic Encephalopathy  History of Alcohol Abuse  Esophagitis determined by endoscopy (biopsy report pending) - report to be followed by GI. Esophageal dysphagia   Unintentional weight loss  Protein-calorie malnutrition  Normocytic normochromic anemia  Generalized weakness  History of a fall    Primary Problem  Hyponatremia    Active Hospital Problems    Diagnosis Date Noted    Acute metabolic encephalopathy [Q95.37] 12/21/2018    Syndrome of inappropriate ADH (SIADH) secretion (HCC) [E22.2] 12/21/2018    History of alcohol abuse [Z87.898] 12/20/2018    Esophagitis determined by endoscopy [K20.9] 12/20/2018    Protein-calorie malnutrition ([address] [E46] 12/19/2018    Hyponatremia [E87.1] 12/16/2018    Hypokalemia [E87.6] 12/16/2018    Generalized weakness [R53.1] 12/16/2018    Normocytic normochromic anemia [D64.9] 12/16/2018    History of fall [Z91.81] 12/16/2018    Esophageal dysphagia [R13.10] 12/16/2018    Unintentional weight loss [R63.4] 12/16/2018     Past Medical History:   Diagnosis Date    Seizures ([address]     pt stated was three years ago     The patient was seen and examined on day of discharge and this discharge summary is in conjunction with any daily progress note from day of discharge. Code Status:  Full Code    Hospital Course: uneventful. Consults:  GI, nephrology, neurology and psychiatry    Significant Diagnostic Studies: see EHR. Esophageal Biopsy report pending - report to be followed by GI.     Treatments: medical.    Disposition: SNF    Discharged Condition: Stable    Follow Up:  SNF medications and discharge plan. Electronically signed by Jermain Trimble MD on 12/21/2018 at 3:25 PM     Thank you  No primary care provider on file. for the opportunity to be involved in this patient's care.

## 2018-12-21 NOTE — PROGRESS NOTES
Occupational Therapy  Facility/Department: [facility] PROGRESSIVE CARE  Daily Treatment Note  NAME: Viridiana Hernandes  : 1945  MRN: [id number]    Date of Service: 2018    Discharge Recommendations:  [address]       Patient Diagnosis(es): The encounter diagnosis was Hyponatremia. has a past medical history of Seizures ([address] has a past surgical history that includes Cardiac surgery; Upper gastrointestinal endoscopy (2018); pr esophagogastroduodenoscopy transoral diagnostic (N/A, 2018); Upper gastrointestinal endoscopy (2018); and Upper gastrointestinal endoscopy (N/A, 2018).     Restrictions  Restrictions/Precautions  Restrictions/Precautions: General Precautions, Fall Risk  Required Braces or Orthoses?: No  Position Activity Restriction  Other position/activity restrictions: bed alarm, 2 L O2 per NC, LUE IV, pt is incontinent of stool/urine, pt refused to complte any standing or getting up to chair this date     Subjective   General  Chart Reviewed: Yes  Patient assessed for rehabilitation services?: Yes  Family / Caregiver Present: No  Pre Treatment Pain Screening  Intervention List: Patient able to continue with treatment  Pain Assessment  Patient Currently in Pain: Denies    Orientation  Orientation  Overall Orientation Status: Within Functional Limits  Objective    ADL  UE Bathing: Setup (set up for sponge bath seated EOB )  LE Bathing: Maximum assistance (in supine as pt refuses to stand )  UE Dressing: Minimal assistance (with donning new hosp gown )  LE Dressing: Maximum assistance (to soraya B  socks )  Toileting: Maximum assistance (for clean up, doffing soiled brief, donning clean one as pt with bowel/bladder incontinence all supine in bed as pt refuses to stand )        Balance  Sitting Balance: Contact guard assistance (CG to SBA )  Standing Balance: Unable to assess (pt refuses to complete any standing )  Standing Balance  Time: pt sat at

## 2018-12-22 ENCOUNTER — HOSPITAL ENCOUNTER (OUTPATIENT)
Age: 73
Setting detail: SPECIMEN
Discharge: HOME | End: 2018-12-22
Payer: MEDICARE

## 2018-12-22 PROCEDURE — 87493 C DIFF AMPLIFIED PROBE: CPT

## 2018-12-23 LAB
C DIFFICILE TOXINS, PCR: NORMAL
SPECIMEN DESCRIPTION: NORMAL

## 2018-12-24 LAB — SURGICAL PATHOLOGY REPORT: NORMAL

## 2019-01-17 PROBLEM — I10 ESSENTIAL HYPERTENSION: Status: ACTIVE | Noted: 2019-01-17

## 2025-01-10 ENCOUNTER — APPOINTMENT (OUTPATIENT)
Dept: CT IMAGING | Age: 80
DRG: 477 | End: 2025-01-10
Payer: MEDICARE

## 2025-01-10 ENCOUNTER — HOSPITAL ENCOUNTER (INPATIENT)
Age: 80
LOS: 5 days | Discharge: NURSING, REHAB OR LONG-TERM CARE | DRG: 477 | End: 2025-01-15
Attending: EMERGENCY MEDICINE | Admitting: FAMILY MEDICINE
Payer: MEDICARE

## 2025-01-10 ENCOUNTER — APPOINTMENT (OUTPATIENT)
Dept: GENERAL RADIOLOGY | Age: 80
DRG: 477 | End: 2025-01-10
Payer: MEDICARE

## 2025-01-10 DIAGNOSIS — S32.010S COMPRESSION FRACTURE OF L1 VERTEBRA, SEQUELA: ICD-10-CM

## 2025-01-10 DIAGNOSIS — S32.000A COMPRESSION FRACTURE OF LUMBAR VERTEBRA, UNSPECIFIED LUMBAR VERTEBRAL LEVEL, INITIAL ENCOUNTER (HCC): ICD-10-CM

## 2025-01-10 DIAGNOSIS — T79.6XXA TRAUMATIC RHABDOMYOLYSIS, INITIAL ENCOUNTER (HCC): Primary | ICD-10-CM

## 2025-01-10 DIAGNOSIS — M80.00XA AGE-RELATED OSTEOPOROSIS WITH CURRENT PATHOLOGICAL FRACTURE, INITIAL ENCOUNTER: ICD-10-CM

## 2025-01-10 DIAGNOSIS — J18.9 PNEUMONIA OF LOWER LOBE DUE TO INFECTIOUS ORGANISM, UNSPECIFIED LATERALITY: ICD-10-CM

## 2025-01-10 DIAGNOSIS — R29.6 UNWITNESSED FALL: ICD-10-CM

## 2025-01-10 PROBLEM — R33.9 URINARY RETENTION: Status: ACTIVE | Noted: 2025-01-10

## 2025-01-10 PROBLEM — S32.010A COMPRESSION FRACTURE OF L1 VERTEBRA, INITIAL ENCOUNTER (HCC): Status: ACTIVE | Noted: 2025-01-10

## 2025-01-10 PROBLEM — M62.82 RHABDOMYOLYSIS: Status: ACTIVE | Noted: 2025-01-10

## 2025-01-10 LAB
ANION GAP SERPL CALCULATED.3IONS-SCNC: 19 MMOL/L (ref 9–16)
BACTERIA URNS QL MICRO: ABNORMAL
BASOPHILS # BLD: 0 K/UL (ref 0–0.2)
BASOPHILS NFR BLD: 0 %
BILIRUB UR QL STRIP: ABNORMAL
BUN SERPL-MCNC: 16 MG/DL (ref 8–23)
CALCIUM SERPL-MCNC: 8.9 MG/DL (ref 8.8–10.2)
CASTS #/AREA URNS LPF: ABNORMAL /LPF
CASTS #/AREA URNS LPF: ABNORMAL /LPF
CHLORIDE SERPL-SCNC: 86 MMOL/L (ref 98–107)
CK SERPL-CCNC: 216 U/L (ref 26–192)
CK SERPL-CCNC: 310 U/L (ref 26–192)
CLARITY UR: CLEAR
CO2 SERPL-SCNC: 20 MMOL/L (ref 20–31)
COLOR UR: YELLOW
CREAT SERPL-MCNC: 0.8 MG/DL (ref 0.5–0.9)
EOSINOPHIL # BLD: 0 K/UL (ref 0–0.4)
EOSINOPHILS RELATIVE PERCENT: 0 % (ref 1–4)
EPI CELLS #/AREA URNS HPF: ABNORMAL /HPF (ref 0–5)
ERYTHROCYTE [DISTWIDTH] IN BLOOD BY AUTOMATED COUNT: 11.4 % (ref 11.8–14.4)
GFR, ESTIMATED: 74 ML/MIN/1.73M2
GLUCOSE SERPL-MCNC: 182 MG/DL (ref 82–115)
GLUCOSE UR STRIP-MCNC: NEGATIVE MG/DL
HCT VFR BLD AUTO: 40.9 % (ref 36.3–47.1)
HGB BLD-MCNC: 14.3 G/DL (ref 11.9–15.1)
HGB UR QL STRIP.AUTO: NEGATIVE
IMM GRANULOCYTES # BLD AUTO: 0.1 K/UL (ref 0–0.3)
IMM GRANULOCYTES NFR BLD: 1 %
KETONES UR STRIP-MCNC: ABNORMAL MG/DL
LACTATE BLDV-SCNC: 1.7 MMOL/L (ref 0.5–1.9)
LACTATE BLDV-SCNC: 1.7 MMOL/L (ref 0.5–1.9)
LACTATE BLDV-SCNC: 2.5 MMOL/L (ref 0.5–2.2)
LACTATE BLDV-SCNC: NORMAL MMOL/L (ref 0.5–2.2)
LACTIC ACID, WHOLE BLOOD: NORMAL MMOL/L (ref 0.7–2.1)
LEUKOCYTE ESTERASE UR QL STRIP: NEGATIVE
LYMPHOCYTES NFR BLD: 0.67 K/UL (ref 1–4.8)
LYMPHOCYTES RELATIVE PERCENT: 7 % (ref 24–44)
MAGNESIUM SERPL-MCNC: 1.7 MG/DL (ref 1.6–2.4)
MCH RBC QN AUTO: 29.4 PG (ref 25.2–33.5)
MCHC RBC AUTO-ENTMCNC: 35 G/DL (ref 28.4–34.8)
MCV RBC AUTO: 84 FL (ref 82.6–102.9)
MONOCYTES NFR BLD: 1.06 K/UL (ref 0.2–0.8)
MONOCYTES NFR BLD: 11 % (ref 1–7)
MUCOUS THREADS URNS QL MICRO: ABNORMAL
MYOGLOBIN SERPL-MCNC: 207 NG/ML (ref 25–58)
MYOGLOBIN SERPL-MCNC: 85 NG/ML (ref 25–58)
NEUTROPHILS NFR BLD: 81 % (ref 36–66)
NEUTS SEG NFR BLD: 7.77 K/UL (ref 1.8–7.7)
NITRITE UR QL STRIP: NEGATIVE
NRBC BLD-RTO: 0 PER 100 WBC
PH UR STRIP: 6 [PH] (ref 5–8)
PLATELET # BLD AUTO: 272 K/UL (ref 138–453)
PMV BLD AUTO: 9.8 FL (ref 8.1–13.5)
POTASSIUM SERPL-SCNC: 4.2 MMOL/L (ref 3.7–5.3)
PROT UR STRIP-MCNC: ABNORMAL MG/DL
RBC # BLD AUTO: 4.87 M/UL (ref 3.95–5.11)
RBC #/AREA URNS HPF: ABNORMAL /HPF (ref 0–2)
SODIUM SERPL-SCNC: 124 MMOL/L (ref 136–145)
SODIUM SERPL-SCNC: 125 MMOL/L (ref 136–145)
SP GR UR STRIP: 1.02 (ref 1–1.03)
TROPONIN I SERPL HS-MCNC: 16 NG/L (ref 0–14)
TROPONIN I SERPL HS-MCNC: 17 NG/L (ref 0–14)
UROBILINOGEN UR STRIP-ACNC: NORMAL EU/DL (ref 0–1)
WBC #/AREA URNS HPF: ABNORMAL /HPF (ref 0–5)
WBC OTHER # BLD: 9.6 K/UL (ref 3.5–11.3)

## 2025-01-10 PROCEDURE — 2700000000 HC OXYGEN THERAPY PER DAY

## 2025-01-10 PROCEDURE — 83605 ASSAY OF LACTIC ACID: CPT

## 2025-01-10 PROCEDURE — 93005 ELECTROCARDIOGRAM TRACING: CPT | Performed by: EMERGENCY MEDICINE

## 2025-01-10 PROCEDURE — 72128 CT CHEST SPINE W/O DYE: CPT

## 2025-01-10 PROCEDURE — 84295 ASSAY OF SERUM SODIUM: CPT

## 2025-01-10 PROCEDURE — 80048 BASIC METABOLIC PNL TOTAL CA: CPT

## 2025-01-10 PROCEDURE — 2580000003 HC RX 258

## 2025-01-10 PROCEDURE — 36415 COLL VENOUS BLD VENIPUNCTURE: CPT

## 2025-01-10 PROCEDURE — 83935 ASSAY OF URINE OSMOLALITY: CPT

## 2025-01-10 PROCEDURE — 6360000002 HC RX W HCPCS: Performed by: EMERGENCY MEDICINE

## 2025-01-10 PROCEDURE — 84300 ASSAY OF URINE SODIUM: CPT

## 2025-01-10 PROCEDURE — 99222 1ST HOSP IP/OBS MODERATE 55: CPT

## 2025-01-10 PROCEDURE — 82550 ASSAY OF CK (CPK): CPT

## 2025-01-10 PROCEDURE — 72131 CT LUMBAR SPINE W/O DYE: CPT

## 2025-01-10 PROCEDURE — 2060000000 HC ICU INTERMEDIATE R&B

## 2025-01-10 PROCEDURE — 72170 X-RAY EXAM OF PELVIS: CPT

## 2025-01-10 PROCEDURE — 71045 X-RAY EXAM CHEST 1 VIEW: CPT

## 2025-01-10 PROCEDURE — 72125 CT NECK SPINE W/O DYE: CPT

## 2025-01-10 PROCEDURE — 84484 ASSAY OF TROPONIN QUANT: CPT

## 2025-01-10 PROCEDURE — 83874 ASSAY OF MYOGLOBIN: CPT

## 2025-01-10 PROCEDURE — 94760 N-INVAS EAR/PLS OXIMETRY 1: CPT

## 2025-01-10 PROCEDURE — 81001 URINALYSIS AUTO W/SCOPE: CPT

## 2025-01-10 PROCEDURE — 83735 ASSAY OF MAGNESIUM: CPT

## 2025-01-10 PROCEDURE — 96365 THER/PROPH/DIAG IV INF INIT: CPT

## 2025-01-10 PROCEDURE — 99285 EMERGENCY DEPT VISIT HI MDM: CPT

## 2025-01-10 PROCEDURE — 85025 COMPLETE CBC W/AUTO DIFF WBC: CPT

## 2025-01-10 PROCEDURE — 51702 INSERT TEMP BLADDER CATH: CPT

## 2025-01-10 PROCEDURE — 2500000003 HC RX 250 WO HCPCS

## 2025-01-10 PROCEDURE — 51798 US URINE CAPACITY MEASURE: CPT

## 2025-01-10 PROCEDURE — 70450 CT HEAD/BRAIN W/O DYE: CPT

## 2025-01-10 PROCEDURE — 2580000003 HC RX 258: Performed by: EMERGENCY MEDICINE

## 2025-01-10 RX ORDER — ENOXAPARIN SODIUM 100 MG/ML
40 INJECTION SUBCUTANEOUS DAILY
Status: DISCONTINUED | OUTPATIENT
Start: 2025-01-10 | End: 2025-01-13

## 2025-01-10 RX ORDER — ACETAMINOPHEN 650 MG/1
650 SUPPOSITORY RECTAL EVERY 6 HOURS PRN
Status: DISCONTINUED | OUTPATIENT
Start: 2025-01-10 | End: 2025-01-15 | Stop reason: HOSPADM

## 2025-01-10 RX ORDER — CITALOPRAM HYDROBROMIDE 20 MG/1
20 TABLET ORAL DAILY
Status: ON HOLD | COMMUNITY
End: 2025-01-12

## 2025-01-10 RX ORDER — POTASSIUM CHLORIDE 1500 MG/1
20 TABLET, EXTENDED RELEASE ORAL 2 TIMES DAILY
Status: ON HOLD | COMMUNITY
End: 2025-01-12

## 2025-01-10 RX ORDER — ONDANSETRON 4 MG/1
4 TABLET, ORALLY DISINTEGRATING ORAL EVERY 8 HOURS PRN
Status: DISCONTINUED | OUTPATIENT
Start: 2025-01-10 | End: 2025-01-15 | Stop reason: HOSPADM

## 2025-01-10 RX ORDER — POTASSIUM CHLORIDE 7.45 MG/ML
10 INJECTION INTRAVENOUS PRN
Status: DISCONTINUED | OUTPATIENT
Start: 2025-01-10 | End: 2025-01-15 | Stop reason: HOSPADM

## 2025-01-10 RX ORDER — ACETAMINOPHEN 325 MG/1
650 TABLET ORAL EVERY 6 HOURS PRN
Status: DISCONTINUED | OUTPATIENT
Start: 2025-01-10 | End: 2025-01-15 | Stop reason: HOSPADM

## 2025-01-10 RX ORDER — MAGNESIUM SULFATE 1 G/100ML
1000 INJECTION INTRAVENOUS PRN
Status: DISCONTINUED | OUTPATIENT
Start: 2025-01-10 | End: 2025-01-15 | Stop reason: HOSPADM

## 2025-01-10 RX ORDER — POLYETHYLENE GLYCOL 3350 17 G/17G
17 POWDER, FOR SOLUTION ORAL DAILY PRN
Status: DISCONTINUED | OUTPATIENT
Start: 2025-01-10 | End: 2025-01-15 | Stop reason: HOSPADM

## 2025-01-10 RX ORDER — SODIUM CHLORIDE 0.9 % (FLUSH) 0.9 %
5-40 SYRINGE (ML) INJECTION EVERY 12 HOURS SCHEDULED
Status: DISCONTINUED | OUTPATIENT
Start: 2025-01-10 | End: 2025-01-15 | Stop reason: HOSPADM

## 2025-01-10 RX ORDER — LEVOFLOXACIN 5 MG/ML
750 INJECTION, SOLUTION INTRAVENOUS ONCE
Status: COMPLETED | OUTPATIENT
Start: 2025-01-10 | End: 2025-01-10

## 2025-01-10 RX ORDER — 0.9 % SODIUM CHLORIDE 0.9 %
500 INTRAVENOUS SOLUTION INTRAVENOUS ONCE
Status: COMPLETED | OUTPATIENT
Start: 2025-01-10 | End: 2025-01-10

## 2025-01-10 RX ORDER — ONDANSETRON 2 MG/ML
4 INJECTION INTRAMUSCULAR; INTRAVENOUS EVERY 6 HOURS PRN
Status: DISCONTINUED | OUTPATIENT
Start: 2025-01-10 | End: 2025-01-15 | Stop reason: HOSPADM

## 2025-01-10 RX ORDER — SODIUM CHLORIDE 9 MG/ML
INJECTION, SOLUTION INTRAVENOUS PRN
Status: DISCONTINUED | OUTPATIENT
Start: 2025-01-10 | End: 2025-01-15 | Stop reason: HOSPADM

## 2025-01-10 RX ORDER — METOPROLOL TARTRATE 50 MG
50 TABLET ORAL 2 TIMES DAILY
Status: ON HOLD | COMMUNITY
End: 2025-01-12

## 2025-01-10 RX ORDER — POTASSIUM CHLORIDE 1500 MG/1
40 TABLET, EXTENDED RELEASE ORAL PRN
Status: DISCONTINUED | OUTPATIENT
Start: 2025-01-10 | End: 2025-01-15 | Stop reason: HOSPADM

## 2025-01-10 RX ORDER — SODIUM CHLORIDE 9 MG/ML
INJECTION, SOLUTION INTRAVENOUS CONTINUOUS
Status: DISCONTINUED | OUTPATIENT
Start: 2025-01-10 | End: 2025-01-11

## 2025-01-10 RX ORDER — SODIUM CHLORIDE 0.9 % (FLUSH) 0.9 %
10 SYRINGE (ML) INJECTION PRN
Status: DISCONTINUED | OUTPATIENT
Start: 2025-01-10 | End: 2025-01-15 | Stop reason: HOSPADM

## 2025-01-10 RX ADMIN — SODIUM CHLORIDE, PRESERVATIVE FREE 10 ML: 5 INJECTION INTRAVENOUS at 19:43

## 2025-01-10 RX ADMIN — SODIUM CHLORIDE 500 ML: 9 INJECTION, SOLUTION INTRAVENOUS at 12:39

## 2025-01-10 RX ADMIN — SODIUM CHLORIDE: 9 INJECTION, SOLUTION INTRAVENOUS at 19:46

## 2025-01-10 RX ADMIN — LEVOFLOXACIN 750 MG: 5 INJECTION, SOLUTION INTRAVENOUS at 14:43

## 2025-01-10 NOTE — ED PROVIDER NOTES
EMERGENCY DEPARTMENT ENCOUNTER    Pt Name: Mela Wood  MRN: [id number]  Birthdate 1945  Date of evaluation: 1/10/25  CHIEF COMPLAINT       Chief Complaint   Patient presents with    Fall     Yesterday around 1600, found today. Denies LOC or thinners     HISTORY OF PRESENT ILLNESS   HPI   The patient is a 79-year-old female who presented to the emergency department by EMS from home secondary to fall.  Patient sustained a fall approximately 4 PM yesterday and has been on the floor of the home since today after being found by her sister.  She does not have a life alert.  Patient says she sustained a fall cannot recall if she was lightheaded or dizzy but was not able to stand due to increased weakness.  She not use of blood thinners such as Coumadin aspirin or Plavix.  Patient complains of pain in the back of her head as well as back.  Patient denies chest pain, shortness of breath, nausea, vomiting, fevers or chills      REVIEW OF SYSTEMS     Review of Systems   Constitutional:  Positive for fatigue. Negative for chills, diaphoresis and fever.   HENT:  Negative for congestion, ear pain and facial swelling.    Eyes:  Negative for pain, discharge and visual disturbance.   Respiratory:  Negative for chest tightness and shortness of breath.    Cardiovascular:  Negative for chest pain and palpitations.   Gastrointestinal:  Negative for abdominal distention and abdominal pain.   Genitourinary:  Negative for difficulty urinating and flank pain.   Musculoskeletal:  Negative for back pain.   Skin:  Negative for wound.   Neurological:  Positive for weakness and headaches. Negative for dizziness and light-headedness.     PASTMEDICAL HISTORY     Past Medical History:   Diagnosis Date    Essential hypertension 1/17/2019    Seizures (HCC)     pt stated was three years ago     Past Problem List  Patient Active Problem List   Diagnosis Code    Anemia D64.9    Recurrent falls while walking R29.6    PVD (peripheral vascular  Abnormal; Notable for the following components:    Myoglobin 207 (*)     All other components within normal limits   TROPONIN - Abnormal; Notable for the following components:    Troponin, High Sensitivity 16 (*)     All other components within normal limits   CBC WITH AUTO DIFFERENTIAL - Abnormal; Notable for the following components:    MCHC 35.0 (*)     RDW 11.4 (*)     Neutrophils % 81 (*)     Lymphocytes % 7 (*)     Monocytes % 11 (*)     Eosinophils % 0 (*)     Immature Granulocytes % 1 (*)     Neutrophils Absolute 7.77 (*)     Lymphocytes Absolute 0.67 (*)     Monocytes Absolute 1.06 (*)     All other components within normal limits   LACTIC ACID   MAGNESIUM   PREVIOUS SPECIMEN   URINALYSIS WITH MICROSCOPIC   SPECIMEN REJECTION   LACTATE, SEPSIS       Vitals Reviewed:    Vitals:    01/10/25 1445 01/10/25 1449 01/10/25 1455 01/10/25 1616   BP: 108/76      Pulse:  (!) 109 (!) 110 (!) 109   Resp: 30 30  25   Temp:       TempSrc:       SpO2: 95%  97% 94%   Weight:       Height:         MEDICATIONS GIVEN TO PATIENT THIS ENCOUNTER:  Orders Placed This Encounter   Medications    sodium chloride 0.9 % bolus 500 mL    levoFLOXacin (LEVAQUIN) 750 MG/150ML infusion 750 mg     Order Specific Question:   Antimicrobial Indications     Answer:   Pneumonia (CAP)     Order Specific Question:   CAP duration of therapy     Answer:   7 days     DISCHARGE PRESCRIPTIONS:  New Prescriptions    No medications on file     PHYSICIAN CONSULTS ORDERED THIS ENCOUNTER:  IP CONSULT TO ORTHOPEDIC SURGERY  IP CONSULT TO INTERNAL MEDICINE  FINAL IMPRESSION      1. Traumatic rhabdomyolysis, initial encounter ([facility])    2. Pneumonia of lower lobe due to infectious organism, unspecified laterality    3. Compression fracture of lumbar vertebra, unspecified lumbar vertebral level, initial encounter ([facility])          DISPOSITION/PLAN   DISPOSITION Admitted 01/10/2025 04:02:47 PM               OUTPATIENT FOLLOW UP THE PATIENT:  No follow-up provider

## 2025-01-10 NOTE — ED NOTES
Pt to the ED via EMS from home today. Per report pt had a fall in her kitchen yesterday around 1600. She was found by her sister earlier today. Pt denies any LOC or taking any thinners. Pt brought in for further evaluation.

## 2025-01-10 NOTE — PROGRESS NOTES
Pharmacy Medication Review    The patient's list of current home medications has been reviewed.     PHYSICIANS AND NURSE PRACTITIONERS: please note there is no Transitions of Care/Med Rec Pharmacist available to address any discrepancies on inpatient orders. It is the responsibility of the attending provider to review the updates made to the home med list and adjust inpatient orders as appropriate.        Source(s) of information:patient, [facility] refill report and [facility] pharmacy        Based on information provided by the above source(s), I have updated the patient's home med list as described below.     Removed   Polyethyl Glycol-Propyl Glycol (SYSTANE OP)  metoprolol (LOPRESSOR) 100 MG tablet  nicotine (NICODERM CQ) 21 MG/24HR  cyanocobalamin ([employer] VITAMIN B12) 1000 MCG tablet  folic acid (FOLVITE) 1 MG tablet  aspirin 81 MG tablet  citalopram (CELEXA) 40 MG tablet  potassium chloride (KLOR-CON) 20 MEQ packet      Added metoprolol tartrate (LOPRESSOR) 50 MG tablet  citalopram (CELEXA) 20 MG tablet  potassium chloride (KLOR-CON) 20 MEQ tablet      Adjusted   None      Other notes:   Pt stated that she has not taken any medication or seen a doctor in 5 or six years. This medication was last filled 01/17/2019     Are any of the medications noted above considered a 'high alert' medication? no      Is the patient on warfarin at home? No          Please feel free to call me with any questions about this encounter. Thank you.      Michael Chiu, pharmacy technician  [facility]  Phone:  [phone]      Electronically signed by Michael Chiu on 1/10/2025 at 3:55 PM   Note: co-signature by the pharmacist only acknowledges that I have performed a medication review and does not attest to an evaluation of this medication review.        Prior to Admission medications    Medication Sig   metoprolol tartrate (LOPRESSOR) 50 MG tablet Take 1 tablet by mouth 2 times daily  Patient not taking: Reported on  1/10/2025   citalopram (CELEXA) 20 MG tablet Take 1 tablet by mouth daily  Patient not taking: Reported on 1/10/2025     pantoprazole (PROTONIX) 40 MG tablet Take 1 tablet by mouth every morning (before breakfast)  Patient not taking: Reported on 1/10/2025     vitamin B-1 100 MG tablet Take 1 tablet by mouth daily  Patient not taking: Reported on 1/10/2025     sodium chloride 1 g tablet Take 1 g by mouth 3 times daily as needed  Patient not taking: Reported on 1/10/2025     potassium chloride (KLOR-CON) 20 MEQ tablet    Take 20 mEq by mouth 2 times daily  Patient not taking: Reported on 1/10/2025   gabapentin (NEURONTIN) 300 MG capsule Take 300 mg by mouth 3 times daily..  Patient not taking: Reported on 1/10/2025     atorvastatin (LIPITOR) 10 MG tablet Take 10 mg by mouth daily  Patient not taking: Reported on 1/10/2025     losartan (COZAAR) 25 MG tablet Take 25 mg by mouth daily  Patient not taking: Reported on 1/10/2025     isosorbide mononitrate (IMDUR) 30 MG extended release tablet Take 30 mg by mouth daily  Patient not taking: Reported on 1/10/2025     clopidogrel (PLAVIX) 75 MG tablet Take 75 mg by mouth daily  Patient not taking: Reported on 1/10/2025

## 2025-01-10 NOTE — CONSULTS
Maximilian Regalado MD  Urology Consultation    Patient:  Mela Wood  MRN: [id number]  YOB: 1945    CHIEF COMPLAINT: Urinary retention difficult catheterization    HISTORY OF PRESENT ILLNESS:   The patient is a 79 y.o. female who presents with history of fall, rhabdomyolysis pneumonia managed with Rocephin and azithromycin  Patient with compression fracture of L1  Urology consulted because of multiple attempted catheterizations from the staff have failed    Patient's old records, notes and chart reviewed and summarized above.    Past Medical History:    Past Medical History:   Diagnosis Date    Essential hypertension 1/17/2019    Seizures (HCC)     pt stated was three years ago       Past Surgical History:    Past Surgical History:   Procedure Laterality Date    CARDIAC SURGERY      stents    [place] ESOPHAGOGASTRODUODENOSCOPY TRANSORAL DIAGNOSTIC N/A 6/23/2018    EGD DIAGNOSTIC ONLY performed by Piero Goodrich MD at [facility] OR    UPPER GASTROINTESTINAL ENDOSCOPY  06/23/2018    UPPER GASTROINTESTINAL ENDOSCOPY  12/20/2018    with biopsy by Dr. Griffin    UPPER GASTROINTESTINAL ENDOSCOPY N/A 12/20/2018    EGD BIOPSY performed by Joshua Griffin MD at [facility] OR     Previous  surgery: none   Medications:    Scheduled Meds:  Continuous Infusions:  PRN Meds:.    Allergies:  Penicillins    Social History:    Social History     Socioeconomic History    Marital status: Single     Spouse name: Not on file    Number of children: Not on file    Years of education: Not on file    Highest education level: Not on file   Occupational History    Not on file   Tobacco Use    Smoking status: Every Day     Current packs/day: 1.00     Types: Cigarettes    Smokeless tobacco: Never   Substance and Sexual Activity    Alcohol use: No     Comment: patient states it has been at least 8 weeks since her last drink    Drug use: No    Sexual activity: Not on file   Other Topics Concern    Not on file   Social History Narrative

## 2025-01-10 NOTE — PROGRESS NOTES
Pt admitted to room 55933 at this time. Vital signs obtained, telemetry initiated. Admission database completed, home med list reviewed. Dawkins placed at bedside with Dr. Regalado, pt had bladder spasm, Dr Regalado made aware. Bed alarm on for pt safety, call light within reach.

## 2025-01-10 NOTE — ED NOTES
Per Dr. Goodrich, urology needs to be consulted. Message for Dr. Regalado sent with perfect serve

## 2025-01-10 NOTE — H&P
department due to a fall happening on January 9 around 4 PM.  Patient has a chronic history of hypertension.  While in the emergency department her sodium was 125 and her lactic acid was 2.5, her total CK was 310, and her myoglobin was 207.  A CT of her lumbar spine was performed and revealed an acute compression fracture of the L1 vertebral body.    Currently the patient is endorsing back pain, difficulty with urination, decreased appetite, and weakness.  Patient states that she has been falling lately.  Patient fell yesterday approximately 4 PM in her kitchen, denies any dizziness, shortness of breath, or chest pain before she fell.  Her sister found her earlier this morning lying on the kitchen floor.  Patient denies taking any of her home medications because she was not sure what she was supposed to take anymore.  She has no primary care physician.  Patient endorses that she does drink a lot of alcohol usually vodka.  She states that her last drink was a few days ago when she had 3 shots of vodka.    Past Medical History:     Past Medical History:   Diagnosis Date    Essential hypertension 1/17/2019    Seizures (HCC)     pt stated was three years ago        Past Surgical History:     Past Surgical History:   Procedure Laterality Date    CARDIAC SURGERY      stents    [place] ESOPHAGOGASTRODUODENOSCOPY TRANSORAL DIAGNOSTIC N/A 6/23/2018    EGD DIAGNOSTIC ONLY performed by Piero Goodrich MD at [facility] OR    UPPER GASTROINTESTINAL ENDOSCOPY  06/23/2018    UPPER GASTROINTESTINAL ENDOSCOPY  12/20/2018    with biopsy by Dr. Griffin    UPPER GASTROINTESTINAL ENDOSCOPY N/A 12/20/2018    EGD BIOPSY performed by Joshua Griffin MD at [facility] OR        Medications Prior to Admission:     Prior to Admission medications    Medication Sig Start Date End Date Taking? Authorizing Provider   metoprolol tartrate (LOPRESSOR) 50 MG tablet Take 1 tablet by mouth 2 times daily  Patient not taking: Reported on 1/10/2025    Provider,  Acid 2.5 (H) 0.5 - 2.2 mmol/L   Magnesium    Collection Time: 01/10/25  2:05 PM   Result Value Ref Range    Magnesium 1.7 1.6 - 2.4 mg/dL   Myoglobin, Blood    Collection Time: 01/10/25  2:05 PM   Result Value Ref Range    Myoglobin 207 (H) 25 - 58 ng/mL   Troponin    Collection Time: 01/10/25  2:05 PM   Result Value Ref Range    Troponin, High Sensitivity 16 (H) 0 - 14 ng/L   CBC with Auto Differential    Collection Time: 01/10/25  2:05 PM   Result Value Ref Range    WBC 9.6 3.5 - 11.3 k/uL    RBC 4.87 3.95 - 5.11 m/uL    Hemoglobin 14.3 11.9 - 15.1 g/dL    Hematocrit 40.9 36.3 - 47.1 %    MCV 84.0 82.6 - 102.9 fL    MCH 29.4 25.2 - 33.5 pg    MCHC 35.0 (H) 28.4 - 34.8 g/dL    RDW 11.4 (L) 11.8 - 14.4 %    Platelets 272 138 - 453 k/uL    MPV 9.8 8.1 - 13.5 fL    NRBC Automated 0.0 0.0 per 100 WBC    Neutrophils % 81 (H) 36 - 66 %    Lymphocytes % 7 (L) 24 - 44 %    Monocytes % 11 (H) 1 - 7 %    Eosinophils % 0 (L) 1 - 4 %    Basophils % 0 %    Immature Granulocytes % 1 (H) 0 %    Neutrophils Absolute 7.77 (H) 1.8 - 7.7 k/uL    Lymphocytes Absolute 0.67 (L) 1.0 - 4.8 k/uL    Monocytes Absolute 1.06 (H) 0.2 - 0.8 k/uL    Eosinophils Absolute 0.00 0.0 - 0.4 k/uL    Basophils Absolute 0.00 0.0 - 0.2 k/uL    Immature Granulocytes Absolute 0.10 0.00 - 0.30 k/uL       Imaging/Diagnostics:  CT LUMBAR SPINE WO CONTRAST    Result Date: 1/10/2025  Acute compression fracture of the L1 vertebral body with approximately 20% loss of height.  This is new when compared to the June 21, 2018 exam appears as an acute vertical component extending through the superior endplate of L1. Multilevel lumbar spondylosis as described above.  There is moderate to marked central canal stenosis at the L4-5 level.     CT THORACIC SPINE WO CONTRAST    Result Date: 1/10/2025  1.  No acute fractures. 2.  Stable appearance of a chronic compression fracture of the T6 vertebral body.     CT Head W/O Contrast    Result Date: 1/10/2025  1. No acute

## 2025-01-11 ENCOUNTER — APPOINTMENT (OUTPATIENT)
Age: 80
DRG: 477 | End: 2025-01-11
Attending: FAMILY MEDICINE
Payer: MEDICARE

## 2025-01-11 PROBLEM — S16.1XXA CERVICAL STRAIN, ACUTE: Status: ACTIVE | Noted: 2025-01-11

## 2025-01-11 PROBLEM — M80.00XA AGE-RELATED OSTEOPOROSIS WITH CURRENT PATHOLOGICAL FRACTURE: Status: ACTIVE | Noted: 2025-01-11

## 2025-01-11 PROBLEM — I70.90 ATHEROSCLEROSIS: Status: ACTIVE | Noted: 2025-01-11

## 2025-01-11 PROBLEM — M80.88XA PATHOLOGICAL FRACTURE OF LUMBAR VERTEBRA DUE TO SECONDARY OSTEOPOROSIS (HCC): Status: ACTIVE | Noted: 2025-01-11

## 2025-01-11 PROBLEM — S32.000A COMPRESSION OF LUMBAR VERTEBRA (HCC): Status: ACTIVE | Noted: 2025-01-11

## 2025-01-11 PROBLEM — M54.9 INTRACTABLE BACK PAIN: Status: ACTIVE | Noted: 2025-01-11

## 2025-01-11 PROBLEM — J18.9 PNEUMONIA OF LOWER LOBE DUE TO INFECTIOUS ORGANISM: Status: ACTIVE | Noted: 2025-01-11

## 2025-01-11 LAB
25(OH)D3 SERPL-MCNC: <6 NG/ML (ref 30–100)
ANION GAP SERPL CALCULATED.3IONS-SCNC: 15 MMOL/L (ref 9–16)
BASOPHILS # BLD: 0 K/UL (ref 0–0.2)
BASOPHILS NFR BLD: 0 %
BUN SERPL-MCNC: 15 MG/DL (ref 8–23)
CALCIUM SERPL-MCNC: 8.4 MG/DL (ref 8.8–10.2)
CHLORIDE SERPL-SCNC: 92 MMOL/L (ref 98–107)
CK SERPL-CCNC: 111 U/L (ref 26–192)
CK SERPL-CCNC: 113 U/L (ref 26–192)
CK SERPL-CCNC: 145 U/L (ref 26–192)
CK SERPL-CCNC: 151 U/L (ref 26–192)
CO2 SERPL-SCNC: 18 MMOL/L (ref 20–31)
CREAT SERPL-MCNC: 0.7 MG/DL (ref 0.5–0.9)
ECHO AO ROOT DIAM: 3 CM
ECHO AO ROOT INDEX: 1.92 CM/M2
ECHO AV AREA PEAK VELOCITY: 1.7 CM2
ECHO AV AREA VTI: 1.6 CM2
ECHO AV AREA/BSA PEAK VELOCITY: 1.1 CM2/M2
ECHO AV AREA/BSA VTI: 1 CM2/M2
ECHO AV MEAN GRADIENT: 3 MMHG
ECHO AV MEAN VELOCITY: 0.8 M/S
ECHO AV PEAK GRADIENT: 4 MMHG
ECHO AV PEAK VELOCITY: 1.1 M/S
ECHO AV VELOCITY RATIO: 0.73
ECHO AV VTI: 19 CM
ECHO BSA: 1.58 M2
ECHO LA AREA 2C: 7.7 CM2
ECHO LA AREA 4C: 9.3 CM2
ECHO LA DIAMETER INDEX: 1.79 CM/M2
ECHO LA DIAMETER: 2.8 CM
ECHO LA MAJOR AXIS: 4.5 CM
ECHO LA MINOR AXIS: 3.9 CM
ECHO LA TO AORTIC ROOT RATIO: 0.93
ECHO LA VOL BP: 15 ML (ref 22–52)
ECHO LA VOL MOD A2C: 13 ML (ref 22–52)
ECHO LA VOL MOD A4C: 15 ML (ref 22–52)
ECHO LA VOL/BSA BIPLANE: 10 ML/M2 (ref 16–34)
ECHO LA VOLUME INDEX MOD A2C: 8 ML/M2 (ref 16–34)
ECHO LA VOLUME INDEX MOD A4C: 10 ML/M2 (ref 16–34)
ECHO LV E' LATERAL VELOCITY: 8.38 CM/S
ECHO LV E' SEPTAL VELOCITY: 5.87 CM/S
ECHO LV EF PHYSICIAN: 50 %
ECHO LV FRACTIONAL SHORTENING: 30 % (ref 28–44)
ECHO LV INTERNAL DIMENSION DIASTOLE INDEX: 2.12 CM/M2
ECHO LV INTERNAL DIMENSION DIASTOLIC: 3.3 CM (ref 3.9–5.3)
ECHO LV INTERNAL DIMENSION SYSTOLIC INDEX: 1.47 CM/M2
ECHO LV INTERNAL DIMENSION SYSTOLIC: 2.3 CM
ECHO LV IVSD: 1 CM (ref 0.6–0.9)
ECHO LV MASS 2D: 94.6 G (ref 67–162)
ECHO LV MASS INDEX 2D: 60.6 G/M2 (ref 43–95)
ECHO LV POSTERIOR WALL DIASTOLIC: 1 CM (ref 0.6–0.9)
ECHO LV RELATIVE WALL THICKNESS RATIO: 0.61
ECHO LVOT AREA: 2.3 CM2
ECHO LVOT AV VTI INDEX: 0.69
ECHO LVOT DIAM: 1.7 CM
ECHO LVOT MEAN GRADIENT: 1 MMHG
ECHO LVOT PEAK GRADIENT: 2 MMHG
ECHO LVOT PEAK VELOCITY: 0.8 M/S
ECHO LVOT STROKE VOLUME INDEX: 19.1 ML/M2
ECHO LVOT SV: 29.7 ML
ECHO LVOT VTI: 13.1 CM
ECHO MV A VELOCITY: 0.95 M/S
ECHO MV E DECELERATION TIME (DT): 157 MS
ECHO MV E VELOCITY: 0.54 M/S
ECHO MV E/A RATIO: 0.57
ECHO MV E/E' LATERAL: 6.44
ECHO MV E/E' RATIO (AVERAGED): 7.82
ECHO MV E/E' SEPTAL: 9.2
ECHO PV MAX VELOCITY: 1.1 M/S
ECHO PV PEAK GRADIENT: 5 MMHG
ECHO RA AREA 4C: 6.5 CM2
ECHO RA END SYSTOLIC VOLUME APICAL 4 CHAMBER INDEX BSA: 6 ML/M2
ECHO RA VOLUME: 10 ML
ECHO RV BASAL DIMENSION: 2.2 CM
ECHO RV FREE WALL PEAK S': 7.8 CM/S
ECHO RV TAPSE: 0.8 CM (ref 1.7–?)
EOSINOPHIL # BLD: 0 K/UL (ref 0–0.4)
EOSINOPHILS RELATIVE PERCENT: 0 % (ref 1–4)
ERYTHROCYTE [DISTWIDTH] IN BLOOD BY AUTOMATED COUNT: 11.4 % (ref 11.8–14.4)
ETHANOL PERCENT: NORMAL %
ETHANOLAMINE SERPL-MCNC: <10 MG/DL (ref 0–0.08)
GFR, ESTIMATED: 89 ML/MIN/1.73M2
GLUCOSE SERPL-MCNC: 158 MG/DL (ref 82–115)
HCT VFR BLD AUTO: 32.5 % (ref 36.3–47.1)
HGB BLD-MCNC: 11.4 G/DL (ref 11.9–15.1)
IMM GRANULOCYTES # BLD AUTO: 0 K/UL (ref 0–0.3)
IMM GRANULOCYTES NFR BLD: 0 %
INR PPP: 1.1
LYMPHOCYTES NFR BLD: 0.46 K/UL (ref 1–4.8)
LYMPHOCYTES RELATIVE PERCENT: 8 % (ref 24–44)
MCH RBC QN AUTO: 29.6 PG (ref 25.2–33.5)
MCHC RBC AUTO-ENTMCNC: 35.1 G/DL (ref 28.4–34.8)
MCV RBC AUTO: 84.4 FL (ref 82.6–102.9)
MONOCYTES NFR BLD: 0.64 K/UL (ref 0.2–0.8)
MONOCYTES NFR BLD: 11 % (ref 1–7)
MYOGLOBIN SERPL-MCNC: 41 NG/ML (ref 25–58)
MYOGLOBIN SERPL-MCNC: 49 NG/ML (ref 25–58)
MYOGLOBIN SERPL-MCNC: 57 NG/ML (ref 25–58)
MYOGLOBIN SERPL-MCNC: 78 NG/ML (ref 25–58)
NEUTROPHILS NFR BLD: 81 % (ref 36–66)
NEUTS SEG NFR BLD: 4.7 K/UL (ref 1.8–7.7)
NRBC BLD-RTO: 0 PER 100 WBC
OSMOLALITY UR: 615 MOSM/KG (ref 80–1300)
PLATELET # BLD AUTO: 258 K/UL (ref 138–453)
PMV BLD AUTO: 10.1 FL (ref 8.1–13.5)
POTASSIUM SERPL-SCNC: 4 MMOL/L (ref 3.7–5.3)
PROTHROMBIN TIME: 14.4 SEC (ref 11.5–14.2)
RBC # BLD AUTO: 3.85 M/UL (ref 3.95–5.11)
SODIUM SERPL-SCNC: 125 MMOL/L (ref 136–145)
SODIUM SERPL-SCNC: 127 MMOL/L (ref 136–145)
SODIUM SERPL-SCNC: 128 MMOL/L (ref 136–145)
SODIUM SERPL-SCNC: 129 MMOL/L (ref 136–145)
SODIUM UR-SCNC: 29 MMOL/L
WBC OTHER # BLD: 5.8 K/UL (ref 3.5–11.3)

## 2025-01-11 PROCEDURE — 99223 1ST HOSP IP/OBS HIGH 75: CPT | Performed by: INTERNAL MEDICINE

## 2025-01-11 PROCEDURE — 93306 TTE W/DOPPLER COMPLETE: CPT

## 2025-01-11 PROCEDURE — 6370000000 HC RX 637 (ALT 250 FOR IP)

## 2025-01-11 PROCEDURE — 36415 COLL VENOUS BLD VENIPUNCTURE: CPT

## 2025-01-11 PROCEDURE — 99232 SBSQ HOSP IP/OBS MODERATE 35: CPT | Performed by: FAMILY MEDICINE

## 2025-01-11 PROCEDURE — 2580000003 HC RX 258

## 2025-01-11 PROCEDURE — 6370000000 HC RX 637 (ALT 250 FOR IP): Performed by: FAMILY MEDICINE

## 2025-01-11 PROCEDURE — 2060000000 HC ICU INTERMEDIATE R&B

## 2025-01-11 PROCEDURE — 85610 PROTHROMBIN TIME: CPT

## 2025-01-11 PROCEDURE — G0480 DRUG TEST DEF 1-7 CLASSES: HCPCS

## 2025-01-11 PROCEDURE — 83874 ASSAY OF MYOGLOBIN: CPT

## 2025-01-11 PROCEDURE — 84295 ASSAY OF SERUM SODIUM: CPT

## 2025-01-11 PROCEDURE — 82306 VITAMIN D 25 HYDROXY: CPT

## 2025-01-11 PROCEDURE — 6360000002 HC RX W HCPCS: Performed by: FAMILY MEDICINE

## 2025-01-11 PROCEDURE — 2500000003 HC RX 250 WO HCPCS

## 2025-01-11 PROCEDURE — 85025 COMPLETE CBC W/AUTO DIFF WBC: CPT

## 2025-01-11 PROCEDURE — 82550 ASSAY OF CK (CPK): CPT

## 2025-01-11 PROCEDURE — 6360000002 HC RX W HCPCS

## 2025-01-11 PROCEDURE — 80048 BASIC METABOLIC PNL TOTAL CA: CPT

## 2025-01-11 RX ORDER — METOPROLOL SUCCINATE 25 MG/1
25 TABLET, EXTENDED RELEASE ORAL DAILY
Status: DISCONTINUED | OUTPATIENT
Start: 2025-01-11 | End: 2025-01-15 | Stop reason: HOSPADM

## 2025-01-11 RX ORDER — AMLODIPINE BESYLATE 5 MG/1
5 TABLET ORAL DAILY
Status: DISCONTINUED | OUTPATIENT
Start: 2025-01-11 | End: 2025-01-12

## 2025-01-11 RX ORDER — HYDRALAZINE HYDROCHLORIDE 20 MG/ML
5 INJECTION INTRAMUSCULAR; INTRAVENOUS EVERY 6 HOURS PRN
Status: DISCONTINUED | OUTPATIENT
Start: 2025-01-11 | End: 2025-01-15 | Stop reason: HOSPADM

## 2025-01-11 RX ADMIN — AMLODIPINE BESYLATE 5 MG: 5 TABLET ORAL at 17:06

## 2025-01-11 RX ADMIN — HYDRALAZINE HYDROCHLORIDE 5 MG: 20 INJECTION INTRAMUSCULAR; INTRAVENOUS at 23:20

## 2025-01-11 RX ADMIN — WATER 1000 MG: 1 INJECTION INTRAMUSCULAR; INTRAVENOUS; SUBCUTANEOUS at 14:44

## 2025-01-11 RX ADMIN — AZITHROMYCIN MONOHYDRATE 500 MG: 500 INJECTION, POWDER, LYOPHILIZED, FOR SOLUTION INTRAVENOUS at 14:55

## 2025-01-11 RX ADMIN — ENOXAPARIN SODIUM 40 MG: 100 INJECTION SUBCUTANEOUS at 08:39

## 2025-01-11 RX ADMIN — METOPROLOL SUCCINATE 25 MG: 25 TABLET, EXTENDED RELEASE ORAL at 11:23

## 2025-01-11 RX ADMIN — ACETAMINOPHEN 650 MG: 325 TABLET ORAL at 14:44

## 2025-01-11 RX ADMIN — SODIUM CHLORIDE, PRESERVATIVE FREE 10 ML: 5 INJECTION INTRAVENOUS at 19:52

## 2025-01-11 ASSESSMENT — PAIN SCALES - GENERAL
PAINLEVEL_OUTOF10: 5
PAINLEVEL_OUTOF10: 0
PAINLEVEL_OUTOF10: 8

## 2025-01-11 ASSESSMENT — PAIN DESCRIPTION - ORIENTATION: ORIENTATION: LOWER;MID

## 2025-01-11 ASSESSMENT — PAIN DESCRIPTION - DESCRIPTORS: DESCRIPTORS: SHARP

## 2025-01-11 ASSESSMENT — PAIN DESCRIPTION - LOCATION: LOCATION: BACK

## 2025-01-11 ASSESSMENT — PAIN - FUNCTIONAL ASSESSMENT: PAIN_FUNCTIONAL_ASSESSMENT: PREVENTS OR INTERFERES WITH MANY ACTIVE NOT PASSIVE ACTIVITIES

## 2025-01-11 NOTE — PROCEDURES
PROCEDURE NOTE  Date: 1/11/2025   Name: Mela Wood  YOB: 1945    Procedures     Diagnosis:  Acute urinary retention                      Difficult catheterization                       Vaginal atrophy     Procedure:  Urethral dilation insert Dawkins   With the assistance of 2 nursing staff we proceeded with a pelvic exam,   Genitalia and introitus prepped with a Betadine examination revealed significant vaginal atrophy the urethral meatus is restricted and receded behind the symphysis     Genital dilatation of the urethral meatus through size 18   Insertion of a 16.  Coude catheter   Immediate efflux of dark ana urine, greater than 500 mL drained from the bladder   Procedure well tolerated   Catheter secured to the leg   Patient has not seen Urology in the past     She will require  urology follow-up  upon discharge

## 2025-01-11 NOTE — PROGRESS NOTES
[x] Medication Reconciliation was completed and the patient's home medication list was verified. The Med List Status is marked \"Complete\". The following sources were used to assist with Medication Reconciliation:    [x] Med Rec Pharmacist already completed   [] Patient had a list of medications which was transcribed into the EHR and verified for accuracy.  [] Patient provided bottles of their medications for validation  [] Home medications reviewed and confirmed with   [] Contacted patient's pharmacy to confirm home medications  [] Contacted patient's physician office to confirm home medications  [] Medical Records from another facility and/or Care Everywhere were reviewed and validated  [] Physician was contacted for modifications needed    [] There are one or more home medications that need clarification before Medication Reconciliation can be completed. The Med List Status has been marked as In Progress.     To assist with Home Medication Reconciliation the following actions have been taken:    [] Family requested to bring medications into the hospital  [] Family requested to call hospital with medication list  [] Message left with physician office to call unit  [] Request for medical records made   [] MAR from facility requested to be faxed over  [] Unable to complete due to patient condition  [] Unable to validate home medications  [] Other       *Effective communication is essential throughout this process. It is important for the nurse to document the progress to keep others informed about the status in the reconciliation.  Notes can be recorded in the medication list with every medication, as well as in a general nursing note. If modifications are needed it is the responsibility of the RN to communicate need for modifications to the physician.

## 2025-01-11 NOTE — CONSULTS
Orthopedic Consult    Requesting Physician: Claire    CHIEF COMPLAINT: Spine pain    HISTORY OF PRESENT ILLNESS:      The patient is a 79 y.o. female  who presents with [facility] ER admission after a kitchen standing height fall.  On the floor half the day her family came over calling 911 and transferred to [facility] for evaluation.  1-10 ER imaging included chest film, pelvis, CT head, cervical, thoracic, lumbar spine.  Acute L1 compression fracture identified.  I am asked to see her orthopedically.  She also acknowledges cervical greater than lumbar pain.  Mechanical.  No bowel or bladder sphincter incontinence.  No sciatica.  No current complaints right or left upper extremity, right or left lower extremity.    Past history includes atherosclerosis and T6 compression fracture.  Denies osteoporosis, rheumatoid arthritis, lupus, gout, spine surgery.    Past Medical History:    Past Medical History:   Diagnosis Date    Atherosclerosis 1/11/2025    Essential hypertension 1/17/2019    Seizures (HCC)     pt stated was three years ago       Past Surgical History:    Past Surgical History:   Procedure Laterality Date    CARDIAC SURGERY      stents    [place] ESOPHAGOGASTRODUODENOSCOPY TRANSORAL DIAGNOSTIC N/A 6/23/2018    EGD DIAGNOSTIC ONLY performed by Piero Goodrich MD at [facility] OR    UPPER GASTROINTESTINAL ENDOSCOPY  06/23/2018    UPPER GASTROINTESTINAL ENDOSCOPY  12/20/2018    with biopsy by Dr. Griffin    UPPER GASTROINTESTINAL ENDOSCOPY N/A 12/20/2018    EGD BIOPSY performed by Joshua Griffin MD at [facility] OR       Medications Prior to Admission:   Current Facility-Administered Medications   Medication Dose Route Frequency Provider Last Rate Last Admin    sodium chloride flush 0.9 % injection 5-40 mL  5-40 mL IntraVENous 2 times per day Joycelyn Oconnor APRN - CNP   10 mL at 01/10/25 1943    sodium chloride flush 0.9 % injection 10 mL  10 mL IntraVENous PRN Joycelyn Oconnor APRN - CNP        0.9 % sodium  family members.    3.  Mechanical DVT prophylax    4.  Pain control    5.  Cardiology evaluation, clearance    6.  Med management hyponatremia.  Currently 125    7.  Follow closely with you.  Further suggestions to follow.  Thank you very much for the consultation        Malik Davalos MD

## 2025-01-11 NOTE — CARE COORDINATION
Case Management Assessment  Initial Evaluation    Date/Time of Evaluation: 1/11/2025 2:34 PM  Assessment Completed by: ROSE MARIE REINA RN    If patient is discharged prior to next notation, then this note serves as note for discharge by case management.    Patient Name: Mela Wood                   YOB: 1945  Diagnosis: Traumatic rhabdomyolysis, initial encounter ([facility]) [T79.6XXA]  Pneumonia of lower lobe due to infectious organism, unspecified laterality [J18.9]  Compression fracture of L1 vertebra, initial encounter ([facility]) [S32.010A]  Compression fracture of lumbar vertebra, unspecified lumbar vertebral level, initial encounter ([facility]) [S32.000A]                   Date / Time: 1/10/2025 11:43 AM    Patient Admission Status: Inpatient   Readmission Risk (Low < 19, Mod (19-27), High > 27): Readmission Risk Score: 12.1    Current PCP: No primary care provider on file.  PCP verified by CM? No (No PCP. List provided.)    Chart Reviewed: Yes      History Provided by: Patient  Patient Orientation: Alert and Oriented, Person, Place, Situation, Self    Patient Cognition: Alert    Hospitalization in the last 30 days (Readmission):  No    If yes, Readmission Assessment in  Navigator will be completed.    Advance Directives:      Code Status: Limited   Patient's Primary Decision Maker is: Legal Next of Kin      Discharge Planning:    Patient lives with: Alone Type of Home: House  Primary Care Giver: Self  Patient Support Systems include: Family Members   Current Financial resources: None  Current community resources: None  Current services prior to admission: Durable Medical Equipment            Current DME: Walker            Type of Home Care services:  PT, OT, Skilled Therapy    ADLS  Prior functional level: Independent in ADLs/IADLs  Current functional level: Assistance with the following:, Bathing, Toileting, Cooking, Housework, Shopping, Mobility    PT AM-PAC:   /24  OT AM-PAC:   /24    Family

## 2025-01-11 NOTE — PROGRESS NOTES
[facility]  Office: [phone]  Karl Ocampo DO, Arthur Cornejo DO, Daren Birmingham DO, Erwin Galarza DO, Kamran Vizcaino MD, Carlotta Adkins MD, Rick Goodrich MD, Concha Machuca MD,  Barry Curry MD, Parul Olivia MD, Greg Porras MD,  Ty Carr DO, Ghulam Garcia MD, Alpesh Medina MD, Blake Ocampo DO, Barbara Flaherty MD,  Jose Stone DO, Ivett Keyes MD, Cassandra Mora MD, Dahlia Cheema MD, Hanna Domínguez MD,  Eric Mcgill MD, Nadege Morataya MD, Swathi Baxter MD, Madhav Gurrola MD, Jose Roberto MD, Santos Ramirez MD, Avi Victoria DO, Chris Desir MD, Ty Patton MD, Mohsin Reza, MD, Shirley Waterhouse, CNP,  Cynthia Bruce CNP, Avi Matta, ANASTACIO,  Nahomy Parada, VICENTA, Carolina Elaine, ANASTACIO, Vianca Whalen, CNP, Joslyn Jeter, ANASTACIO, Kathryn Chacon, CNP, Brittany Bolden, PA-C, Leighann Heath PA-C, Lydia Alexander, CNP, Saida Reyes, CNP,  Kayla Brown, CNP, Joycelyn Oconnor, CNP, Celia Martínez, CNP,  Ruth Monte, ANASTACIO, Eboni Watson, CNP       [facility]      Daily Progress Note     Admit Date: 1/10/2025  Bed/Room No.  1025/1025-01  Admitting Physician : Rick Goodrich MD  Code Status :Limited  Hospital Day:  LOS: 1 day   Chief Complaint:     Chief Complaint   Patient presents with    Fall     Yesterday around 1600, found today. Denies LOC or thinners     Principal Problem:    Pathological fracture of lumbar vertebra due to secondary osteoporosis (HCC)  Active Problems:    Recurrent falls while walking    Hyponatremia    Generalized weakness    Rhabdomyolysis    Urinary retention    Age-related osteoporosis with current pathological fracture    Intractable back pain    Atherosclerosis    Cervical strain, acute  Resolved Problems:    * No resolved hospital problems. *    Subjective :        Interval History/Significant events :  01/11/25    Patient continues to complain of low back pain.  She is having difficulty and sitting, turning in the

## 2025-01-11 NOTE — PLAN OF CARE
Patient aox4. Most recent sodium 127.  MRI cervical spine and lumbar spine ordered, will be done Monday. Checklist complete.   Possible surgery for L1 fracture  Dawkins in place and draining  Blood pressures have been elevated today. Patient started on metroprolol and amlodipine daily. PRN hydrlazine ordered for SBP >180  Tylenol given once for back pain  VSS, call light within reach, safety maintained        Problem: Discharge Planning  Goal: Discharge to home or other facility with appropriate resources  1/11/2025 1835 by Reyes, Brittnie, RN  Outcome: Progressing     Problem: Skin/Tissue Integrity  Goal: Absence of new skin breakdown  Description: 1.  Monitor for areas of redness and/or skin breakdown  2.  Assess vascular access sites hourly  3.  Every 4-6 hours minimum:  Change oxygen saturation probe site  4.  Every 4-6 hours:  If on nasal continuous positive airway pressure, respiratory therapy assess nares and determine need for appliance change or resting period.  Outcome: Progressing     Problem: Safety - Adult  Goal: Free from fall injury  1/11/2025 1835 by Reyes, Brittnie, RN  Outcome: Progressing     Problem: ABCDS Injury Assessment  Goal: Absence of physical injury  Outcome: Progressing     Problem: Respiratory - Adult  Goal: Achieves optimal ventilation and oxygenation  1/11/2025 1835 by Reyes, Brittnie, RN  Outcome: Progressing     Problem: Musculoskeletal - Adult  Goal: Return mobility to safest level of function  1/11/2025 1835 by Reyes, Brittnie, RN  Outcome: Progressing     Problem: Musculoskeletal - Adult  Goal: Maintain proper alignment of affected body part  1/11/2025 1835 by Reyes, Brittnie, RN  Outcome: Progressing     Problem: Musculoskeletal - Adult  Goal: Return ADL status to a safe level of function  1/11/2025 1835 by Reyes, Brittnie, RN  Outcome: Progressing     Problem: Genitourinary - Adult  Goal: Absence of urinary retention  1/11/2025 1835 by Reyes, Brittnie, RN  Outcome: Progressing

## 2025-01-11 NOTE — CONSULTS
[facility]  In Patient Cardiology Consult      Date:   1/11/2025  Patient name: Mela Wood  Date of admission:  1/10/2025 11:43 AM  MRN:   [id number]  YOB: 1945    Reason for Admission: Fall    CHIEF COMPLAINT: Fall    History Obtained From: The patient and chart    HISTORY OF PRESENT ILLNESS:    This is a 79-year-old female.  She came to the emergency room.  She sustained a fall.  There was no loss of consciousness.  Apparently she was on the floor for quite a while.  This was until her sister found her.  Denies any dizziness, lightheadedness, chest pain, shortness of breath orthopnea, PND, lower extremity edema.  She states back in [place] in 2014 she underwent a PCI, details unclear.    Past Medical History:    Past Medical History:   Diagnosis Date    Atherosclerosis 1/11/2025    Essential hypertension 1/17/2019    Seizures (HCC)     pt stated was three years ago         Past Surgical History:    Past Surgical History:   Procedure Laterality Date    CARDIAC SURGERY      stents    ME ESOPHAGOGASTRODUODENOSCOPY TRANSORAL DIAGNOSTIC N/A 6/23/2018    EGD DIAGNOSTIC ONLY performed by Piero Goodrich MD at [facility] OR    UPPER GASTROINTESTINAL ENDOSCOPY  06/23/2018    UPPER GASTROINTESTINAL ENDOSCOPY  12/20/2018    with biopsy by Dr. Griffin    UPPER GASTROINTESTINAL ENDOSCOPY N/A 12/20/2018    EGD BIOPSY performed by Joshua Griffin MD at [facility] OR         Home Medications:    No outpatient medications have been marked as taking for the 1/10/25 encounter (Hospital Encounter).        Allergies:  Penicillins    Social History:    Social History     Socioeconomic History    Marital status: Single     Spouse name: None    Number of children: None    Years of education: None    Highest education level: None   Tobacco Use    Smoking status: Every Day     Current packs/day: 1.00     Types: Cigarettes    Smokeless tobacco: Never   Substance and Sexual Activity    Alcohol use: No          BNP: No results for input(s): \"BNP\" in the last 72 hours.  PT/INR:   Recent Labs     01/11/25  0525   PROTIME 14.4*   INR 1.1     APTT:No results for input(s): \"APTT\" in the last 72 hours.  CARDIAC ENZYMES:  Recent Labs     01/10/25  1405 01/10/25  1930   TROPHS 16* 17*     FASTING LIPID PANEL:No results found for: \"HDL\", \"LDLDIRECT\", \"TRIG\"  LIVER PROFILE:No results for input(s): \"AST\", \"ALT\", \"LABALBU\" in the last 72 hours.      IMPRESSION:      Preop risk stratification  Mechanical fall  Rhabdomyolysis  Hyponatremia  Possible pneumonia  Lumbar compression fracture  History of coronary artery disease status post PCI in 2014.  The patient in [facility], details unclear, currently stable without angina    RECOMMENDATIONS:  Agree with 2D echocardiogram which has been ordered  Orthopedics is following, they offered possible kyphoplasty, the patient will think about it  On antibiotics per primary team  On beta-blocker  Agree with 2D echocardiogram  If echocardiogram is low risk the patient can proceed at a moderate risk  Prior to discharge please start the patient on aspirin once daily    Discussed with patient and nursing.    Thank you for allowing me to participate in the care of this patient, please do not hesitate to call if you have any questions.    Juvenal Evans DO, FACC, FACOI, RPVI, CAMILLE, EDWARD [facility]  [facility]  [phone]

## 2025-01-11 NOTE — PROGRESS NOTES
Physical Therapy  DATE: 2025    NAME: Mela Wood  MRN: [id number]   : 1945    Patient not seen this date for Physical Therapy due to:      [] Cancel by RN or physician due to:    [] Hemodialysis    [] Critical Lab Value Level     [] Blood transfusion in progress    [] Acute or unstable cardiovascular status   _MAP < 55 or more than >115  _HR < 40 or > 130    [] Acute or unstable pulmonary status   -FiO2 > 60%   _RR < 5 or >40    _O2 sats < 85%    [x] Strict Bedrest    [] Off Unit for surgery or procedure    [] Off Unit for testing       [] Pending imaging to R/O fracture    [] Refusal by Patient      [x] Other: Hold for Ortho consult      [] PT being discontinued at this time. Patient independent. No further needs.     [] PT being discontinued at this time as the patient has been transferred to hospice care. No further needs.      Vida Masters, PT

## 2025-01-11 NOTE — PLAN OF CARE
Pt admitted for rhabdo, hyponatremia, pneumonia, and compression fracture of L1. IV Rocephin and Zithro for pneumonia. Monitoring NA and myglobin Q6hrs. Dawkins placed by urology yesterday and draining. Urine sample sent overnight with results reported to JEAN PAUL Jeter no new orders placed. NS running at 75. Pt reamined alert and oriented x4 all shift. Pt did develop hypoxia overnight and placed on 2L NC and continuous pulse ox, tolerating well. Vitals stable and safety maintained.     Problem: Respiratory - Adult  Goal: Achieves optimal ventilation and oxygenation  Outcome: Progressing     Problem: Genitourinary - Adult  Goal: Absence of urinary retention  Outcome: Progressing  Goal: Urinary catheter remains patent  Outcome: Progressing     Problem: Metabolic/Fluid and Electrolytes - Adult  Goal: Electrolytes maintained within normal limits  Outcome: Progressing  Goal: Hemodynamic stability and optimal renal function maintained  Outcome: Progressing

## 2025-01-11 NOTE — PROGRESS NOTES
Occupational Therapy  DATE: 2025    NAME: Mela Wood  MRN: [id number]   : 1945    Patient not seen this date for Occupational Therapy due to:      [] Cancel by RN or physician due to:    [] Hemodialysis    [] Critical Lab Value Level     [] Blood transfusion in progress    [] Acute or unstable cardiovascular status   _MAP < 55 or more than >115  _HR < 40 or > 130    [] Acute or unstable pulmonary status   -FiO2 > 60%   _RR < 5 or >40    _O2 sats < 85%    [] Strict Bedrest    [] Off Unit for surgery or procedure    [] Off Unit for testing       [] Pending imaging to R/O fracture    [] Refusal by Patient      [] Intubated    [x] Other (Pending ortho consult)       [] OT being discontinued at this time. Patient independent. No further needs.     [] OT being discontinued at this time as the patient has been transferred to hospice care. No further needs.      Melany Davis, GREG, OTR/L

## 2025-01-11 NOTE — CARE COORDINATION
Social work: faxed a referral and asked if [facility] where pt was in the past is still in network in case pt needs snf again. Will need cindy and Rx along with precert and PT/OT notes when able to do therapy. Nahomy cox    Social work: [facility] did respond and they can accept per rep. Will need precert. Nahomy cox

## 2025-01-12 LAB
25(OH)D3 SERPL-MCNC: <6 NG/ML (ref 30–100)
CHOLEST SERPL-MCNC: 136 MG/DL (ref 0–199)
CK SERPL-CCNC: 70 U/L (ref 26–192)
EST. AVERAGE GLUCOSE BLD GHB EST-MCNC: 166 MG/DL
GLUCOSE BLD-MCNC: 183 MG/DL (ref 65–105)
GLUCOSE BLD-MCNC: 235 MG/DL (ref 65–105)
HBA1C MFR BLD: 7.4 % (ref 4–6)
HDLC SERPL-MCNC: 34 MG/DL
LDLC SERPL CALC-MCNC: 84 MG/DL (ref 0–100)
MYOGLOBIN SERPL-MCNC: 38 NG/ML (ref 25–58)
SODIUM SERPL-SCNC: 128 MMOL/L (ref 136–145)
TRIGL SERPL-MCNC: 92 MG/DL
TSH SERPL DL<=0.05 MIU/L-ACNC: 1.03 UIU/ML (ref 0.27–4.2)
VLDLC SERPL CALC-MCNC: 18 MG/DL (ref 1–30)

## 2025-01-12 PROCEDURE — 94640 AIRWAY INHALATION TREATMENT: CPT

## 2025-01-12 PROCEDURE — 2060000000 HC ICU INTERMEDIATE R&B

## 2025-01-12 PROCEDURE — 83036 HEMOGLOBIN GLYCOSYLATED A1C: CPT

## 2025-01-12 PROCEDURE — 2580000003 HC RX 258

## 2025-01-12 PROCEDURE — 80061 LIPID PANEL: CPT

## 2025-01-12 PROCEDURE — 2500000003 HC RX 250 WO HCPCS

## 2025-01-12 PROCEDURE — 84295 ASSAY OF SERUM SODIUM: CPT

## 2025-01-12 PROCEDURE — 6370000000 HC RX 637 (ALT 250 FOR IP): Performed by: FAMILY MEDICINE

## 2025-01-12 PROCEDURE — 99233 SBSQ HOSP IP/OBS HIGH 50: CPT | Performed by: INTERNAL MEDICINE

## 2025-01-12 PROCEDURE — 82306 VITAMIN D 25 HYDROXY: CPT

## 2025-01-12 PROCEDURE — 6360000002 HC RX W HCPCS

## 2025-01-12 PROCEDURE — 84443 ASSAY THYROID STIM HORMONE: CPT

## 2025-01-12 PROCEDURE — 82947 ASSAY GLUCOSE BLOOD QUANT: CPT

## 2025-01-12 PROCEDURE — 36415 COLL VENOUS BLD VENIPUNCTURE: CPT

## 2025-01-12 PROCEDURE — 99232 SBSQ HOSP IP/OBS MODERATE 35: CPT | Performed by: FAMILY MEDICINE

## 2025-01-12 PROCEDURE — 82550 ASSAY OF CK (CPK): CPT

## 2025-01-12 PROCEDURE — 94761 N-INVAS EAR/PLS OXIMETRY MLT: CPT

## 2025-01-12 PROCEDURE — 2700000000 HC OXYGEN THERAPY PER DAY

## 2025-01-12 PROCEDURE — 51702 INSERT TEMP BLADDER CATH: CPT

## 2025-01-12 PROCEDURE — 83874 ASSAY OF MYOGLOBIN: CPT

## 2025-01-12 RX ORDER — INSULIN LISPRO 100 [IU]/ML
0-4 INJECTION, SOLUTION INTRAVENOUS; SUBCUTANEOUS
Status: DISCONTINUED | OUTPATIENT
Start: 2025-01-12 | End: 2025-01-15 | Stop reason: HOSPADM

## 2025-01-12 RX ORDER — MORPHINE SULFATE 2 MG/ML
1 INJECTION, SOLUTION INTRAMUSCULAR; INTRAVENOUS EVERY 4 HOURS PRN
Status: DISCONTINUED | OUTPATIENT
Start: 2025-01-12 | End: 2025-01-15 | Stop reason: HOSPADM

## 2025-01-12 RX ORDER — CLINDAMYCIN PHOSPHATE 600 MG/50ML
600 INJECTION, SOLUTION INTRAVENOUS ONCE
Status: DISCONTINUED | OUTPATIENT
Start: 2025-01-13 | End: 2025-01-15 | Stop reason: HOSPADM

## 2025-01-12 RX ORDER — LEVALBUTEROL 1.25 MG/.5ML
SOLUTION, CONCENTRATE RESPIRATORY (INHALATION)
Status: DISPENSED
Start: 2025-01-12 | End: 2025-01-12

## 2025-01-12 RX ORDER — ATORVASTATIN CALCIUM 20 MG/1
20 TABLET, FILM COATED ORAL DAILY
Status: DISCONTINUED | OUTPATIENT
Start: 2025-01-12 | End: 2025-01-15 | Stop reason: HOSPADM

## 2025-01-12 RX ORDER — SODIUM CHLORIDE FOR INHALATION 0.9 %
3 VIAL, NEBULIZER (ML) INHALATION EVERY 8 HOURS PRN
Status: DISCONTINUED | OUTPATIENT
Start: 2025-01-12 | End: 2025-01-15 | Stop reason: HOSPADM

## 2025-01-12 RX ORDER — LORAZEPAM 2 MG/ML
0.5 INJECTION INTRAMUSCULAR ONCE
Status: COMPLETED | OUTPATIENT
Start: 2025-01-12 | End: 2025-01-13

## 2025-01-12 RX ORDER — LOSARTAN POTASSIUM 25 MG/1
25 TABLET ORAL DAILY
Status: DISCONTINUED | OUTPATIENT
Start: 2025-01-12 | End: 2025-01-15 | Stop reason: HOSPADM

## 2025-01-12 RX ORDER — LEVALBUTEROL 1.25 MG/.5ML
1.25 SOLUTION, CONCENTRATE RESPIRATORY (INHALATION) EVERY 8 HOURS PRN
Status: DISCONTINUED | OUTPATIENT
Start: 2025-01-12 | End: 2025-01-15 | Stop reason: HOSPADM

## 2025-01-12 RX ORDER — DEXTROSE MONOHYDRATE 100 MG/ML
INJECTION, SOLUTION INTRAVENOUS CONTINUOUS PRN
Status: DISCONTINUED | OUTPATIENT
Start: 2025-01-12 | End: 2025-01-15 | Stop reason: HOSPADM

## 2025-01-12 RX ORDER — AMLODIPINE BESYLATE 10 MG/1
10 TABLET ORAL DAILY
Status: DISCONTINUED | OUTPATIENT
Start: 2025-01-13 | End: 2025-01-15 | Stop reason: HOSPADM

## 2025-01-12 RX ADMIN — ATORVASTATIN CALCIUM 20 MG: 20 TABLET, FILM COATED ORAL at 20:16

## 2025-01-12 RX ADMIN — INSULIN LISPRO 1 UNITS: 100 INJECTION, SOLUTION INTRAVENOUS; SUBCUTANEOUS at 20:30

## 2025-01-12 RX ADMIN — SODIUM CHLORIDE, PRESERVATIVE FREE 10 ML: 5 INJECTION INTRAVENOUS at 20:17

## 2025-01-12 RX ADMIN — SODIUM CHLORIDE, PRESERVATIVE FREE 10 ML: 5 INJECTION INTRAVENOUS at 08:23

## 2025-01-12 RX ADMIN — LOSARTAN POTASSIUM 25 MG: 25 TABLET, FILM COATED ORAL at 15:59

## 2025-01-12 RX ADMIN — AZITHROMYCIN MONOHYDRATE 500 MG: 500 INJECTION, POWDER, LYOPHILIZED, FOR SOLUTION INTRAVENOUS at 14:28

## 2025-01-12 RX ADMIN — WATER 1000 MG: 1 INJECTION INTRAMUSCULAR; INTRAVENOUS; SUBCUTANEOUS at 14:19

## 2025-01-12 RX ADMIN — INSULIN LISPRO 1 UNITS: 100 INJECTION, SOLUTION INTRAVENOUS; SUBCUTANEOUS at 17:19

## 2025-01-12 RX ADMIN — MORPHINE SULFATE 1 MG: 2 INJECTION, SOLUTION INTRAMUSCULAR; INTRAVENOUS at 00:49

## 2025-01-12 RX ADMIN — METOPROLOL SUCCINATE 25 MG: 25 TABLET, EXTENDED RELEASE ORAL at 08:21

## 2025-01-12 RX ADMIN — ENOXAPARIN SODIUM 40 MG: 100 INJECTION SUBCUTANEOUS at 08:21

## 2025-01-12 RX ADMIN — LEVALBUTEROL 1.25 MG: 1.25 SOLUTION, CONCENTRATE RESPIRATORY (INHALATION) at 00:48

## 2025-01-12 RX ADMIN — AMLODIPINE BESYLATE 5 MG: 5 TABLET ORAL at 08:21

## 2025-01-12 ASSESSMENT — PAIN SCALES - GENERAL
PAINLEVEL_OUTOF10: 0
PAINLEVEL_OUTOF10: 7
PAINLEVEL_OUTOF10: 0
PAINLEVEL_OUTOF10: 0
PAINLEVEL_OUTOF10: 2

## 2025-01-12 ASSESSMENT — PAIN DESCRIPTION - DESCRIPTORS: DESCRIPTORS: DISCOMFORT;TIGHTNESS

## 2025-01-12 ASSESSMENT — PAIN - FUNCTIONAL ASSESSMENT: PAIN_FUNCTIONAL_ASSESSMENT: PREVENTS OR INTERFERES SOME ACTIVE ACTIVITIES AND ADLS

## 2025-01-12 ASSESSMENT — PAIN DESCRIPTION - ORIENTATION: ORIENTATION: MID;LOWER

## 2025-01-12 ASSESSMENT — PAIN DESCRIPTION - LOCATION: LOCATION: BACK;RIB CAGE

## 2025-01-12 NOTE — PROGRESS NOTES
[facility]  Office: [phone]  Karl Ocampo DO, Arthur Cornejo DO, Daren Birmingham DO, Erwin Galarza DO, Kamran Vizcaino MD, Carlotta Adkins MD, Rick Goodrich MD, Concha Machuca MD,  Barry Curry MD, Parul Olivia MD, Greg Porras MD,  Ty Carr DO, Ghulam Garcia MD, Alpesh Medina MD, Blake Ocampo DO, Barbara Flaherty MD,  Jose Stone DO, Ivett Keyes MD, Cassandra Mora MD, Dahlia Cheema MD, Hanna Domínguez MD,  Eric Mcgill MD, Nadege Morataya MD, Swathi Baxter MD, Madhav Gurrola MD, Jose Roberto MD, Santos Ramirez MD, Avi Victoria DO, Chris Desir MD, Ty Patton MD, Mohsin Reza, MD, Shirley Waterhouse, CNP,  Cynthia Bruce CNP, Avi Matta, ANASTACIO,  Nahomy Parada, VICENTA, Carolina Elaine, ANASTACIO, Vianca Whalen, CNP, Joslyn Jeter, ANASTACIO, Kathryn Chacon, CNP, Brittany Bolden, PA-C, Leighann Heath PA-C, Lydia Alexander, CNP, Saida Reyes, CNP,  Kayla Brown, CNP, Joycelyn Oconnor, CNP, Celia Martínez, CNP,  Ruth Monte, ANASTACIO, Eboni Watson, CNP       [facility]      Daily Progress Note     Admit Date: 1/10/2025  Bed/Room No.  1025/1025-01  Admitting Physician : Rick Goodrich MD  Code Status :Limited  Hospital Day:  LOS: 2 days   Chief Complaint:     Chief Complaint   Patient presents with    Fall     Yesterday around 1600, found today. Denies LOC or thinners     Principal Problem:    Pathological fracture of lumbar vertebra due to secondary osteoporosis (HCC)  Active Problems:    Unwitnessed fall    Hyponatremia    Generalized weakness    Rhabdomyolysis    Urinary retention    Age-related osteoporosis with current pathological fracture    Intractable back pain    Atherosclerosis    Cervical strain, acute    Compression of lumbar vertebra (HCC)    Pneumonia of lower lobe due to infectious organism  Resolved Problems:    * No resolved hospital problems. *    Subjective :        Interval History/Significant events :  01/12/25    Patient was seen

## 2025-01-12 NOTE — PROGRESS NOTES
[facility]  In Patient Cardiology Consult      Date:   1/12/2025  Patient name: Mela Wood  Date of admission:  1/10/2025 11:43 AM  MRN:   [id number]  YOB: 1945    Reason for Admission: Fall    CHIEF COMPLAINT: Fall    Subjective:  No cp  No sob  Echo resulted- low risk  Plan for kyhpo?      Current Facility-Administered Medications:     levalbuterol (XOPENEX) nebulizer solution 1.25 mg, 1.25 mg, Nebulization, Q8H PRN, Kayla Brown APRN - CNP, 1.25 mg at 01/12/25 0048    sodium chloride nebulizer 0.9 % solution 3 mL, 3 mL, Nebulization, Q8H PRN, Kayla Brown APRN - CNP    levalbuterol (XOPENEX) 1.25 MG/0.5ML nebulizer solution, , , ,     morphine (PF) injection 1 mg, 1 mg, IntraVENous, Q4H PRN, Kayla Brown APRN - CNP, 1 mg at 01/12/25 0049    [START ON 1/13/2025] clindamycin (CLEOCIN) 600 mg in dextrose 5 % 50 mL IVPB, 600 mg, IntraVENous, Once, Malik Davalos MD    metoprolol succinate (TOPROL XL) extended release tablet 25 mg, 25 mg, Oral, Daily, Rick Goodrich MD, 25 mg at 01/12/25 0821    amLODIPine (NORVASC) tablet 5 mg, 5 mg, Oral, Daily, Rick Goodrich MD, 5 mg at 01/12/25 0821    hydrALAZINE (APRESOLINE) injection 5 mg, 5 mg, IntraVENous, Q6H PRN, Rick Goordich MD, 5 mg at 01/11/25 2320    sodium chloride flush 0.9 % injection 5-40 mL, 5-40 mL, IntraVENous, 2 times per day, Joycelyn Oconnor APRN - CNP, 10 mL at 01/12/25 0823    sodium chloride flush 0.9 % injection 10 mL, 10 mL, IntraVENous, PRN, Joycelyn Oconnor APRN - CNP    0.9 % sodium chloride infusion, , IntraVENous, PRN, Joycelyn Oconnor APRN - CNP    potassium chloride (KLOR-CON M) extended release tablet 40 mEq, 40 mEq, Oral, PRN **OR** potassium bicarb-citric acid (EFFER-K) effervescent tablet 40 mEq, 40 mEq, Oral, PRN **OR** potassium chloride 10 mEq/100 mL IVPB (Peripheral Line), 10 mEq, IntraVENous, PRN, Joycelyn Oconnor, JUAN - CNP    magnesium sulfate 1000 mg in dextrose 5% 100 mL IVPB, 1,000 mg,  IntraVENous, PRN, Joycelyn Oconnor, APRJASBIR - CNP    enoxaparin (LOVENOX) injection 40 mg, 40 mg, SubCUTAneous, Daily, Joycelyn Oconnor APRN - CNP, 40 mg at 01/12/25 0821    ondansetron (ZOFRAN-ODT) disintegrating tablet 4 mg, 4 mg, Oral, Q8H PRN **OR** ondansetron (ZOFRAN) injection 4 mg, 4 mg, IntraVENous, Q6H PRN, Joycelyn Oconnor APRN - CNP    polyethylene glycol (GLYCOLAX) packet 17 g, 17 g, Oral, Daily PRN, Joycelyn Oconnor, APRN - CNP    acetaminophen (TYLENOL) tablet 650 mg, 650 mg, Oral, Q6H PRN, 650 mg at 01/11/25 1444 **OR** acetaminophen (TYLENOL) suppository 650 mg, 650 mg, Rectal, Q6H PRN, Joycelyn Oconnor, APRN - CNP    cefTRIAXone (ROCEPHIN) 1,000 mg in sterile water 10 mL IV syringe, 1,000 mg, IntraVENous, Q24H, Joycelyn Oconnor APRN - CNP, 1,000 mg at 01/11/25 1444    azithromycin (ZITHROMAX) 500 mg in 250 mL addavial, 500 mg, IntraVENous, Q24H, Joycelyn Oconnor APRN - CNP, Stopped at 01/11/25 1555    PHYSICAL EXAM:    Physical Examination:    BP (!) 151/77   Pulse 97   Temp 98.8 °F (37.1 °C) (Oral)   Resp 18   Ht 1.651 m (5' 5\")   Wt 53.1 kg (117 lb)   SpO2 97%   BMI 19.47 kg/m²    Constitutional and General Appearance: alert, cooperative, no distress and appears stated age  HEENT: PERRL, no cervical lymphadenopathy. No masses palpable. Normal oral mucosa  Respiratory:  Normal excursion and expansion without use of accessory muscles  Resp Auscultation: Good respiratory effort. No for increased work of breathing. On auscultation:clear  Cardiovascular:  Heart tones are crisp and normal. regular S1 and S2. Murmurs: none  Jugular venous pulsation Normal  Abdomen:  No masses or tenderness  Bowel sounds present  Extremities:   No Cyanosis or Clubbing   Lower extremity edema: none   Skin: Warm and dry  Neurological:  Alert and oriented.  Moves all extremities well  No abnormalities of mood, affect, memory, mentation, or behavior are noted    DATA:    Diagnostics:      EKG:   Results for orders placed or performed during the

## 2025-01-12 NOTE — PROGRESS NOTES
Progress Note    1/12/2025 9:32 AM  Subjective:   Admit Date: 1/10/2025  PCP: No primary care provider on file.  Date of Discharge: Unknown    Medications:   Scheduled Meds:   levalbuterol        metoprolol succinate  25 mg Oral Daily    amLODIPine  5 mg Oral Daily    sodium chloride flush  5-40 mL IntraVENous 2 times per day    enoxaparin  40 mg SubCUTAneous Daily    cefTRIAXone (ROCEPHIN) IV  1,000 mg IntraVENous Q24H    azithromycin  500 mg IntraVENous Q24H     Continuous Infusions:   sodium chloride       PRN Meds:levalbuterol, sodium chloride nebulizer, levalbuterol, morphine, hydrALAZINE, sodium chloride flush, sodium chloride, potassium chloride **OR** potassium alternative oral replacement **OR** potassium chloride, magnesium sulfate, ondansetron **OR** ondansetron, polyethylene glycol, acetaminophen **OR** acetaminophen    Diet:   Diet: ADULT DIET; Regular    Subjective:   Systemic or Specific Complaints:No Complaints    Objective:     Patient Vitals for the past 24 hrs:   BP Temp Temp src Pulse Resp SpO2 Weight   01/12/25 0721 (!) 151/77 98.8 °F (37.1 °C) Oral 97 18 97 % --   01/12/25 0436 -- -- -- -- -- 97 % --   01/12/25 0408 137/72 98.2 °F (36.8 °C) Axillary 96 22 97 % 53.1 kg (117 lb)   01/12/25 0119 -- -- -- -- 22 -- --   01/12/25 0107 (!) 166/72 -- -- (!) 113 -- -- --   01/12/25 0045 -- -- -- -- -- 95 % --   01/11/25 2315 (!) 185/95 98.4 °F (36.9 °C) Oral (!) 103 24 92 % --   01/11/25 1933 (!) 158/69 97.9 °F (36.6 °C) Axillary 90 21 92 % --   01/11/25 1830 (!) 158/80 -- -- -- -- -- --   01/11/25 1533 (!) 161/83 97.9 °F (36.6 °C) Oral 88 16 90 % --   01/11/25 1113 (!) 181/90 97.7 °F (36.5 °C) Oral 98 18 94 % --     I/O last 3 completed shifts:  In: 1485.1 [P.O.:240; I.V.:998.3; IV Piggyback:246.8]  Out: 1200 [Urine:1200]  No intake/output data recorded.      General: alert, appears stated age, and cooperative   Wound:    Motion: Painful range of Motion in affected extremity   DVT Exam: No evidence of  DVT seen on physical exam.  Negative Campbell's sign.  No cords or calf tenderness.     Additional exam:     Data Review  CBC:   Recent Labs     01/10/25  1405 01/11/25  0525   WBC 9.6 5.8   HGB 14.3 11.4*    258     BMP:    Recent Labs     01/10/25  1405 01/10/25  2257 01/11/25  0525 01/11/25  1029 01/11/25  1622 01/11/25  2215 01/12/25  0429   *   < > 125*   < > 127* 128* 128*   K 4.2  --  4.0  --   --   --   --    CL 86*  --  92*  --   --   --   --    CO2 20  --  18*  --   --   --   --    BUN 16  --  15  --   --   --   --    CREATININE 0.8  --  0.7  --   --   --   --    GLUCOSE 182*  --  158*  --   --   --   --     < > = values in this interval not displayed.     Hepatic: No results for input(s): \"AST\", \"ALT\", \"BILITOT\", \"ALKPHOS\" in the last 72 hours.    Invalid input(s): \"ALB\"  Troponin: No results for input(s): \"TROPONINI\" in the last 72 hours.  BNP: No results for input(s): \"BNP\" in the last 72 hours.  Lipids:   Recent Labs     01/12/25 0429   CHOL PENDING   HDL PENDING     INR:   Recent Labs     01/11/25 0525   INR 1.1         Assessment:   Mela has improved from yesterday.   Pain is well controlled. She has no nausea and no vomiting.    Current activity is up with assistance  Incision:       Plan:      1: Case discussed with patient, hospitalist at bedside.  Earlier cervical symptoms have resolved so we will discontinue cervical MRI.  She also states she was interested in kyphoplasty.  Will schedule with OR availability Monday Tuesday with medical, cardiology clearance  2:  Continue Deep venous thrombosis prophylaxis  3:  Continue Pain Control        Electronically signed by Malik Davalos MD on 1/12/2025 at 9:32 AM

## 2025-01-12 NOTE — PLAN OF CARE
Patient A/O x4.  Pt on 2L NC for comfort. O2 sat mid-upper 90's.  Dawkins in place.   Pt to have MRI cervical/lumbar spine on Monday. Dr. Goodrich placed order for one time anxiety medication to be given prior to MRI per patient request.   Pressure relief mattress applied to bed and turned Q2 and as needed.  Safety maintained and call light placed within reach. Will continue to monitor and access.  See chart for more detail      Problem: Discharge Planning  Goal: Discharge to home or other facility with appropriate resources  1/12/2025 1115 by Aleisha Morrell RN  Outcome: Progressing  1/12/2025 0206 by Destiny Naylor RN  Outcome: Progressing     Problem: Skin/Tissue Integrity  Goal: Absence of new skin breakdown  Description: 1.  Monitor for areas of redness and/or skin breakdown  2.  Assess vascular access sites hourly  3.  Every 4-6 hours minimum:  Change oxygen saturation probe site  4.  Every 4-6 hours:  If on nasal continuous positive airway pressure, respiratory therapy assess nares and determine need for appliance change or resting period.  1/12/2025 1115 by Aleisha Morrell RN  Outcome: Progressing  1/12/2025 0206 by Destiny Naylor RN  Outcome: Progressing     Problem: Safety - Adult  Goal: Free from fall injury  1/12/2025 1115 by Aleisha Morrell RN  Outcome: Progressing  1/12/2025 0206 by Destiny Naylor RN  Outcome: Progressing     Problem: ABCDS Injury Assessment  Goal: Absence of physical injury  1/12/2025 1115 by Aleisha Morrell RN  Outcome: Progressing  1/12/2025 0206 by Destiny Naylor RN  Outcome: Progressing     Problem: Respiratory - Adult  Goal: Able to breathe comfortably  1/12/2025 0051 by Bonny Swan RCP  Outcome: Progressing  Goal: Patient's breath sounds will be clear and equal  1/12/2025 0051 by Bonny Swan RCP  Outcome: Progressing  Goal: Adequate oxygenation  Description: Adequate oxygenation  1/12/2025 0051 by Bonny Swan RCP  Outcome: Progressing     Problem:

## 2025-01-12 NOTE — PLAN OF CARE
Patient A/O x4.  PRN iv hydralazine given 1x.  During the shift, pt became tachypneic and tachycardic. Respiratory called to bedside. NP notified. PRN nebulizer ordered. PRN iv morphine given 1x. See MAR for details.   Pt on 2L NC for comfort. O2 sat mid-upper 90's.  Dawkins in place.   Pt to have MRI cervical/lumbar spine on Monday.  Safety maintained and call light placed within reach.    Problem: Discharge Planning  Goal: Discharge to home or other facility with appropriate resources  1/12/2025 0206 by Destiny Naylor RN  Outcome: Progressing     Problem: Skin/Tissue Integrity  Goal: Absence of new skin breakdown  Description: 1.  Monitor for areas of redness and/or skin breakdown  2.  Assess vascular access sites hourly  3.  Every 4-6 hours minimum:  Change oxygen saturation probe site  1/12/2025 0206 by Destiny Naylor RN  Outcome: Progressing     Problem: Safety - Adult  Goal: Free from fall injury  1/12/2025 0206 by Destiny Naylor RN  Outcome: Progressing     Problem: ABCDS Injury Assessment  Goal: Absence of physical injury  1/12/2025 0206 by Destiny Naylor RN  Outcome: Progressing     Problem: Respiratory - Adult  Goal: Adequate oxygenation  Description: Adequate oxygenation  1/12/2025 0051 by Bonny Swan RCP  Outcome: Progressing     Problem: Respiratory - Adult  Goal: Patient's breath sounds will be clear and equal  1/12/2025 0051 by Bonny Swan RCP  Outcome: Progressing     Problem: Musculoskeletal - Adult  Goal: Return mobility to safest level of function  1/12/2025 0206 by Destiny Naylor RN  Outcome: Progressing     Problem: Musculoskeletal - Adult  Goal: Maintain proper alignment of affected body part  1/12/2025 0206 by Destiny Naylor RN  Outcome: Progressing     Problem: Genitourinary - Adult  Goal: Absence of urinary retention  1/12/2025 0206 by Destiny Naylor RN  Outcome: Progressing     Problem: Genitourinary - Adult  Goal: Urinary catheter remains patent  1/12/2025 0206 by Destiny Naylor RN  Outcome:

## 2025-01-13 ENCOUNTER — APPOINTMENT (OUTPATIENT)
Dept: GENERAL RADIOLOGY | Age: 80
DRG: 477 | End: 2025-01-13
Payer: MEDICARE

## 2025-01-13 ENCOUNTER — ANESTHESIA (OUTPATIENT)
Dept: OPERATING ROOM | Age: 80
DRG: 477 | End: 2025-01-13
Payer: MEDICARE

## 2025-01-13 ENCOUNTER — APPOINTMENT (OUTPATIENT)
Dept: MRI IMAGING | Age: 80
DRG: 477 | End: 2025-01-13
Payer: MEDICARE

## 2025-01-13 ENCOUNTER — ANESTHESIA EVENT (OUTPATIENT)
Dept: OPERATING ROOM | Age: 80
DRG: 477 | End: 2025-01-13
Payer: MEDICARE

## 2025-01-13 PROBLEM — E55.9 VITAMIN D DEFICIENCY: Status: ACTIVE | Noted: 2025-01-13

## 2025-01-13 LAB
ANION GAP SERPL CALCULATED.3IONS-SCNC: 13 MMOL/L (ref 9–16)
BUN SERPL-MCNC: 9 MG/DL (ref 8–23)
CALCIUM SERPL-MCNC: 8.9 MG/DL (ref 8.8–10.2)
CHLORIDE SERPL-SCNC: 95 MMOL/L (ref 98–107)
CO2 SERPL-SCNC: 22 MMOL/L (ref 20–31)
CREAT SERPL-MCNC: 0.6 MG/DL (ref 0.5–0.9)
EKG ATRIAL RATE: 109 BPM
EKG P AXIS: 78 DEGREES
EKG P-R INTERVAL: 132 MS
EKG Q-T INTERVAL: 348 MS
EKG QRS DURATION: 76 MS
EKG QTC CALCULATION (BAZETT): 468 MS
EKG R AXIS: 60 DEGREES
EKG T AXIS: 56 DEGREES
EKG VENTRICULAR RATE: 109 BPM
ERYTHROCYTE [DISTWIDTH] IN BLOOD BY AUTOMATED COUNT: 11.8 % (ref 11.8–14.4)
GFR, ESTIMATED: >90 ML/MIN/1.73M2
GLUCOSE BLD-MCNC: 117 MG/DL (ref 65–105)
GLUCOSE BLD-MCNC: 145 MG/DL (ref 65–105)
GLUCOSE BLD-MCNC: 259 MG/DL (ref 65–105)
GLUCOSE BLD-MCNC: 299 MG/DL (ref 65–105)
GLUCOSE SERPL-MCNC: 168 MG/DL (ref 82–115)
HCT VFR BLD AUTO: 34.9 % (ref 36.3–47.1)
HGB BLD-MCNC: 12.3 G/DL (ref 11.9–15.1)
MCH RBC QN AUTO: 29.6 PG (ref 25.2–33.5)
MCHC RBC AUTO-ENTMCNC: 35.2 G/DL (ref 28.4–34.8)
MCV RBC AUTO: 84.1 FL (ref 82.6–102.9)
NRBC BLD-RTO: 0 PER 100 WBC
PLATELET # BLD AUTO: 290 K/UL (ref 138–453)
PMV BLD AUTO: 10.1 FL (ref 8.1–13.5)
POTASSIUM SERPL-SCNC: 3.9 MMOL/L (ref 3.7–5.3)
RBC # BLD AUTO: 4.15 M/UL (ref 3.95–5.11)
SODIUM SERPL-SCNC: 129 MMOL/L (ref 136–145)
WBC OTHER # BLD: 5.9 K/UL (ref 3.5–11.3)

## 2025-01-13 PROCEDURE — 88341 IMHCHEM/IMCYTCHM EA ADD ANTB: CPT

## 2025-01-13 PROCEDURE — 3600000012 HC SURGERY LEVEL 2 ADDTL 15MIN: Performed by: ORTHOPAEDIC SURGERY

## 2025-01-13 PROCEDURE — 7100000001 HC PACU RECOVERY - ADDTL 15 MIN: Performed by: ORTHOPAEDIC SURGERY

## 2025-01-13 PROCEDURE — 88342 IMHCHEM/IMCYTCHM 1ST ANTB: CPT

## 2025-01-13 PROCEDURE — 2580000003 HC RX 258

## 2025-01-13 PROCEDURE — 72148 MRI LUMBAR SPINE W/O DYE: CPT

## 2025-01-13 PROCEDURE — 2720000010 HC SURG SUPPLY STERILE: Performed by: ORTHOPAEDIC SURGERY

## 2025-01-13 PROCEDURE — 88364 INSITU HYBRIDIZATION (FISH): CPT

## 2025-01-13 PROCEDURE — 6370000000 HC RX 637 (ALT 250 FOR IP): Performed by: FAMILY MEDICINE

## 2025-01-13 PROCEDURE — 99232 SBSQ HOSP IP/OBS MODERATE 35: CPT | Performed by: FAMILY MEDICINE

## 2025-01-13 PROCEDURE — 2500000003 HC RX 250 WO HCPCS

## 2025-01-13 PROCEDURE — 88311 DECALCIFY TISSUE: CPT

## 2025-01-13 PROCEDURE — 94761 N-INVAS EAR/PLS OXIMETRY MLT: CPT

## 2025-01-13 PROCEDURE — 7100000000 HC PACU RECOVERY - FIRST 15 MIN: Performed by: ORTHOPAEDIC SURGERY

## 2025-01-13 PROCEDURE — 88307 TISSUE EXAM BY PATHOLOGIST: CPT

## 2025-01-13 PROCEDURE — 2580000003 HC RX 258: Performed by: ORTHOPAEDIC SURGERY

## 2025-01-13 PROCEDURE — 2500000003 HC RX 250 WO HCPCS: Performed by: ORTHOPAEDIC SURGERY

## 2025-01-13 PROCEDURE — 82947 ASSAY GLUCOSE BLOOD QUANT: CPT

## 2025-01-13 PROCEDURE — 6360000002 HC RX W HCPCS: Performed by: ORTHOPAEDIC SURGERY

## 2025-01-13 PROCEDURE — 2709999900 HC NON-CHARGEABLE SUPPLY: Performed by: ORTHOPAEDIC SURGERY

## 2025-01-13 PROCEDURE — 85027 COMPLETE CBC AUTOMATED: CPT

## 2025-01-13 PROCEDURE — 6360000002 HC RX W HCPCS: Performed by: FAMILY MEDICINE

## 2025-01-13 PROCEDURE — 6360000002 HC RX W HCPCS

## 2025-01-13 PROCEDURE — 3700000001 HC ADD 15 MINUTES (ANESTHESIA): Performed by: ORTHOPAEDIC SURGERY

## 2025-01-13 PROCEDURE — 3700000000 HC ANESTHESIA ATTENDED CARE: Performed by: ORTHOPAEDIC SURGERY

## 2025-01-13 PROCEDURE — C1713 ANCHOR/SCREW BN/BN,TIS/BN: HCPCS | Performed by: ORTHOPAEDIC SURGERY

## 2025-01-13 PROCEDURE — 80048 BASIC METABOLIC PNL TOTAL CA: CPT

## 2025-01-13 PROCEDURE — 6370000000 HC RX 637 (ALT 250 FOR IP): Performed by: ORTHOPAEDIC SURGERY

## 2025-01-13 PROCEDURE — 93010 ELECTROCARDIOGRAM REPORT: CPT | Performed by: INTERNAL MEDICINE

## 2025-01-13 PROCEDURE — 2700000000 HC OXYGEN THERAPY PER DAY

## 2025-01-13 PROCEDURE — 3600000002 HC SURGERY LEVEL 2 BASE: Performed by: ORTHOPAEDIC SURGERY

## 2025-01-13 PROCEDURE — 99233 SBSQ HOSP IP/OBS HIGH 50: CPT | Performed by: NURSE PRACTITIONER

## 2025-01-13 PROCEDURE — 2060000000 HC ICU INTERMEDIATE R&B

## 2025-01-13 PROCEDURE — C1894 INTRO/SHEATH, NON-LASER: HCPCS | Performed by: ORTHOPAEDIC SURGERY

## 2025-01-13 PROCEDURE — 6360000004 HC RX CONTRAST MEDICATION: Performed by: ORTHOPAEDIC SURGERY

## 2025-01-13 PROCEDURE — 36415 COLL VENOUS BLD VENIPUNCTURE: CPT

## 2025-01-13 PROCEDURE — 88365 INSITU HYBRIDIZATION (FISH): CPT

## 2025-01-13 DEVICE — CEMENT C01A KYPHX HV-R BONE CEMENT EN
Type: IMPLANTABLE DEVICE | Site: VERTEBRAE | Status: FUNCTIONAL
Brand: KYPHON® HV-R® BONE CEMENT

## 2025-01-13 RX ORDER — PROCHLORPERAZINE EDISYLATE 5 MG/ML
10 INJECTION INTRAMUSCULAR; INTRAVENOUS
Status: DISCONTINUED | OUTPATIENT
Start: 2025-01-13 | End: 2025-01-13 | Stop reason: HOSPADM

## 2025-01-13 RX ORDER — SODIUM CHLORIDE 9 MG/ML
INJECTION, SOLUTION INTRAVENOUS PRN
Status: DISCONTINUED | OUTPATIENT
Start: 2025-01-13 | End: 2025-01-13 | Stop reason: HOSPADM

## 2025-01-13 RX ORDER — MEPERIDINE HYDROCHLORIDE 50 MG/ML
12.5 INJECTION INTRAMUSCULAR; INTRAVENOUS; SUBCUTANEOUS EVERY 5 MIN PRN
Status: DISCONTINUED | OUTPATIENT
Start: 2025-01-13 | End: 2025-01-13 | Stop reason: HOSPADM

## 2025-01-13 RX ORDER — SODIUM CHLORIDE 9 MG/ML
INJECTION, SOLUTION INTRAVENOUS
Status: DISCONTINUED | OUTPATIENT
Start: 2025-01-13 | End: 2025-01-13 | Stop reason: SDUPTHER

## 2025-01-13 RX ORDER — NALOXONE HYDROCHLORIDE 0.4 MG/ML
INJECTION, SOLUTION INTRAMUSCULAR; INTRAVENOUS; SUBCUTANEOUS PRN
Status: DISCONTINUED | OUTPATIENT
Start: 2025-01-13 | End: 2025-01-13 | Stop reason: HOSPADM

## 2025-01-13 RX ORDER — SODIUM CHLORIDE 0.9 % (FLUSH) 0.9 %
5-40 SYRINGE (ML) INJECTION EVERY 12 HOURS SCHEDULED
Status: DISCONTINUED | OUTPATIENT
Start: 2025-01-13 | End: 2025-01-13 | Stop reason: HOSPADM

## 2025-01-13 RX ORDER — OXYCODONE HYDROCHLORIDE 5 MG/1
5 TABLET ORAL
Status: DISCONTINUED | OUTPATIENT
Start: 2025-01-13 | End: 2025-01-13 | Stop reason: HOSPADM

## 2025-01-13 RX ORDER — DIPHENHYDRAMINE HYDROCHLORIDE 50 MG/ML
12.5 INJECTION INTRAMUSCULAR; INTRAVENOUS
Status: DISCONTINUED | OUTPATIENT
Start: 2025-01-13 | End: 2025-01-13 | Stop reason: HOSPADM

## 2025-01-13 RX ORDER — SODIUM CHLORIDE 0.9 % (FLUSH) 0.9 %
5-40 SYRINGE (ML) INJECTION PRN
Status: DISCONTINUED | OUTPATIENT
Start: 2025-01-13 | End: 2025-01-13 | Stop reason: HOSPADM

## 2025-01-13 RX ORDER — HYDROCODONE BITARTRATE AND ACETAMINOPHEN 5; 325 MG/1; MG/1
1 TABLET ORAL EVERY 6 HOURS PRN
Status: DISCONTINUED | OUTPATIENT
Start: 2025-01-13 | End: 2025-01-15 | Stop reason: HOSPADM

## 2025-01-13 RX ORDER — INSULIN GLARGINE 100 [IU]/ML
15 INJECTION, SOLUTION SUBCUTANEOUS NIGHTLY
Status: DISCONTINUED | OUTPATIENT
Start: 2025-01-13 | End: 2025-01-15 | Stop reason: HOSPADM

## 2025-01-13 RX ORDER — METOCLOPRAMIDE HYDROCHLORIDE 5 MG/ML
10 INJECTION INTRAMUSCULAR; INTRAVENOUS
Status: DISCONTINUED | OUTPATIENT
Start: 2025-01-13 | End: 2025-01-13 | Stop reason: HOSPADM

## 2025-01-13 RX ORDER — FENTANYL CITRATE 50 UG/ML
INJECTION, SOLUTION INTRAMUSCULAR; INTRAVENOUS
Status: DISCONTINUED | OUTPATIENT
Start: 2025-01-13 | End: 2025-01-13 | Stop reason: SDUPTHER

## 2025-01-13 RX ORDER — ERGOCALCIFEROL 1.25 MG/1
50000 CAPSULE, LIQUID FILLED ORAL WEEKLY
Status: DISCONTINUED | OUTPATIENT
Start: 2025-01-13 | End: 2025-01-15 | Stop reason: HOSPADM

## 2025-01-13 RX ORDER — IOPAMIDOL 612 MG/ML
INJECTION, SOLUTION INTRAVASCULAR PRN
Status: DISCONTINUED | OUTPATIENT
Start: 2025-01-13 | End: 2025-01-13 | Stop reason: ALTCHOICE

## 2025-01-13 RX ORDER — FENTANYL CITRATE 50 UG/ML
25 INJECTION, SOLUTION INTRAMUSCULAR; INTRAVENOUS EVERY 5 MIN PRN
Status: DISCONTINUED | OUTPATIENT
Start: 2025-01-13 | End: 2025-01-13 | Stop reason: HOSPADM

## 2025-01-13 RX ORDER — PROPOFOL 10 MG/ML
INJECTION, EMULSION INTRAVENOUS
Status: DISCONTINUED | OUTPATIENT
Start: 2025-01-13 | End: 2025-01-13 | Stop reason: SDUPTHER

## 2025-01-13 RX ORDER — BUPIVACAINE HYDROCHLORIDE AND EPINEPHRINE 5; 5 MG/ML; UG/ML
INJECTION, SOLUTION EPIDURAL; INTRACAUDAL; PERINEURAL PRN
Status: DISCONTINUED | OUTPATIENT
Start: 2025-01-13 | End: 2025-01-13 | Stop reason: ALTCHOICE

## 2025-01-13 RX ORDER — HYDROMORPHONE HYDROCHLORIDE 1 MG/ML
0.5 INJECTION, SOLUTION INTRAMUSCULAR; INTRAVENOUS; SUBCUTANEOUS EVERY 5 MIN PRN
Status: DISCONTINUED | OUTPATIENT
Start: 2025-01-13 | End: 2025-01-13 | Stop reason: HOSPADM

## 2025-01-13 RX ADMIN — FENTANYL CITRATE 25 MCG: 50 INJECTION INTRAMUSCULAR; INTRAVENOUS at 12:14

## 2025-01-13 RX ADMIN — AZITHROMYCIN MONOHYDRATE 500 MG: 500 INJECTION, POWDER, LYOPHILIZED, FOR SOLUTION INTRAVENOUS at 14:23

## 2025-01-13 RX ADMIN — SODIUM CHLORIDE: 9 INJECTION, SOLUTION INTRAVENOUS at 12:11

## 2025-01-13 RX ADMIN — INSULIN LISPRO 2 UNITS: 100 INJECTION, SOLUTION INTRAVENOUS; SUBCUTANEOUS at 08:54

## 2025-01-13 RX ADMIN — SODIUM CHLORIDE, PRESERVATIVE FREE 10 ML: 5 INJECTION INTRAVENOUS at 08:54

## 2025-01-13 RX ADMIN — ERGOCALCIFEROL 50000 UNITS: 1.25 CAPSULE ORAL at 08:54

## 2025-01-13 RX ADMIN — PROPOFOL 60 MCG/KG/MIN: 10 INJECTION, EMULSION INTRAVENOUS at 12:23

## 2025-01-13 RX ADMIN — ATORVASTATIN CALCIUM 20 MG: 20 TABLET, FILM COATED ORAL at 20:58

## 2025-01-13 RX ADMIN — INSULIN GLARGINE 15 UNITS: 100 INJECTION, SOLUTION SUBCUTANEOUS at 20:58

## 2025-01-13 RX ADMIN — METOPROLOL SUCCINATE 25 MG: 25 TABLET, EXTENDED RELEASE ORAL at 08:54

## 2025-01-13 RX ADMIN — LOSARTAN POTASSIUM 25 MG: 25 TABLET, FILM COATED ORAL at 08:54

## 2025-01-13 RX ADMIN — CHOLECALCIFEROL TAB 125 MCG (5000 UNIT) 5000 UNITS: 125 TAB at 08:54

## 2025-01-13 RX ADMIN — LORAZEPAM 0.5 MG: 2 INJECTION INTRAMUSCULAR; INTRAVENOUS at 08:57

## 2025-01-13 RX ADMIN — WATER 1000 MG: 1 INJECTION INTRAMUSCULAR; INTRAVENOUS; SUBCUTANEOUS at 14:09

## 2025-01-13 RX ADMIN — AMLODIPINE BESYLATE 10 MG: 10 TABLET ORAL at 08:54

## 2025-01-13 RX ADMIN — INSULIN LISPRO 2 UNITS: 100 INJECTION, SOLUTION INTRAVENOUS; SUBCUTANEOUS at 20:58

## 2025-01-13 RX ADMIN — SODIUM CHLORIDE, PRESERVATIVE FREE 10 ML: 5 INJECTION INTRAVENOUS at 21:02

## 2025-01-13 ASSESSMENT — ENCOUNTER SYMPTOMS
WHEEZING: 0
CHEST TIGHTNESS: 0
BLOOD IN STOOL: 0
SHORTNESS OF BREATH: 0
BACK PAIN: 0
DIARRHEA: 0
CONSTIPATION: 0
ABDOMINAL PAIN: 0
VOMITING: 0
RHINORRHEA: 0
COUGH: 0
NAUSEA: 0

## 2025-01-13 ASSESSMENT — PAIN - FUNCTIONAL ASSESSMENT: PAIN_FUNCTIONAL_ASSESSMENT: FACE, LEGS, ACTIVITY, CRY, AND CONSOLABILITY (FLACC)

## 2025-01-13 NOTE — PLAN OF CARE
Pt admitted for compression fracture of L1. No c/o pain overnight. Dawkins in place and draining. Pt currently on 1L NC, attempted to wean to RA overnight but pt desatted. Tolerating 1L NC well. Plan for MRI back and kyphoplasty today. Vitals stable and safety maintained.     Problem: Pain  Goal: Verbalizes/displays adequate comfort level or baseline comfort level  Outcome: Progressing     Problem: Respiratory - Adult  Goal: Achieves optimal ventilation and oxygenation  Outcome: Progressing     Problem: Genitourinary - Adult  Goal: Absence of urinary retention  1/13/2025 0413 by Elizabeth Gallardo, RN  Outcome: Progressing  Goal: Urinary catheter remains patent  1/13/2025 0413 by Elizabeth Gallardo, RN  Outcome: Progressing

## 2025-01-13 NOTE — PLAN OF CARE
Pt remains safe and free from falls thus far in shift  IV Rocephin and Zithromax   Sinus rhythm on cardiac monitor  MRI back completed, gave 1x dose ativan prior to testing  Kyphoplasty with Dr. Davalos done today for L1 compression fx, dressing on mid lower back, clean, dry and intact  Pt takes medications whole but did cough will taking them 1 by 1 this morning, barium swallow ordered for tomorrow since she was NPO today   No c/o pain throughout shift   Dawkins in for rentention, draining with no difficulty, see chart for output  Pt on 1 L O2 new for patient   Discharge planning in progress, [facility] accepted and needs precert   Call light in reach  Bed locked and lowest position  Bed alarm on for safety  Care ongoing        Problem: Discharge Planning  Goal: Discharge to home or other facility with appropriate resources  1/13/2025 0928 by Lisa Humphrey, RN  Outcome: Progressing     Problem: Skin/Tissue Integrity  Goal: Absence of new skin breakdown  Description: 1.  Monitor for areas of redness and/or skin breakdown  2.  Assess vascular access sites hourly  3.  Every 4-6 hours minimum:  Change oxygen saturation probe site  4.  Every 4-6 hours:  If on nasal continuous positive airway pressure, respiratory therapy assess nares and determine need for appliance change or resting period.  Outcome: Progressing     Problem: Safety - Adult  Goal: Free from fall injury  1/13/2025 0928 by Lisa Humphrey, RN  Outcome: Progressing     Problem: ABCDS Injury Assessment  Goal: Absence of physical injury  Outcome: Progressing     Problem: Respiratory - Adult  Goal: Achieves optimal ventilation and oxygenation  1/13/2025 0928 by Lisa Humphrey, RN  Outcome: Progressing     Problem: Musculoskeletal - Adult  Goal: Return mobility to safest level of function  1/13/2025 0928 by Lisa Humphrey, RN  Outcome: Progressing     Problem: Musculoskeletal - Adult  Goal: Maintain proper alignment of affected body part  1/13/2025 0928 by

## 2025-01-13 NOTE — PROGRESS NOTES
[facility]  In Patient Cardiology Consult      Date:   1/13/2025  Patient name: Mela Wood  Date of admission:  1/10/2025 11:43 AM  MRN:   [id number]  YOB: 1945    Reason for Admission: Fall    CHIEF COMPLAINT: Fall    Subjective:  Pt seen and examined in the room.  Patient resting in bed. Pt denies any CP or sob.  Labs, vitals and tele reviewed-     Echo resulted- low risk  Plan for [facility] today      Current Facility-Administered Medications:     vitamin D (ERGOCALCIFEROL) capsule 50,000 Units, 50,000 Units, Oral, Weekly, Malik Davalos MD, 50,000 Units at 01/13/25 0854    vitamin D3 (CHOLECALCIFEROL) tablet 5,000 Units, 5,000 Units, Oral, Daily, Malik Davalos MD, 5,000 Units at 01/13/25 0854    insulin glargine (LANTUS) injection vial 15 Units, 15 Units, SubCUTAneous, Nightly, Rick Goodrich MD    levalbuterol (XOPENEX) nebulizer solution 1.25 mg, 1.25 mg, Nebulization, Q8H PRN, Kayla Brown APRN - CNP, 1.25 mg at 01/12/25 0048    sodium chloride nebulizer 0.9 % solution 3 mL, 3 mL, Nebulization, Q8H PRN, Kayla Brown APRN - CNP    morphine (PF) injection 1 mg, 1 mg, IntraVENous, Q4H PRN, Kayla Brown APRN - CNP, 1 mg at 01/12/25 0049    clindamycin (CLEOCIN) 600 mg in dextrose 5 % 50 mL IVPB, 600 mg, IntraVENous, Once, Malik Davalos MD    atorvastatin (LIPITOR) tablet 20 mg, 20 mg, Oral, Daily, Rick Goodrich MD, 20 mg at 01/12/25 2016    losartan (COZAAR) tablet 25 mg, 25 mg, Oral, Daily, Rick Goodrich MD, 25 mg at 01/13/25 0854    amLODIPine (NORVASC) tablet 10 mg, 10 mg, Oral, Daily, Rick Goodrich MD, 10 mg at 01/13/25 0854    insulin lispro (HUMALOG,ADMELOG) injection vial 0-4 Units, 0-4 Units, SubCUTAneous, 4x Daily AC & HS, Rick Goodrich MD, 2 Units at 01/13/25 0854    glucose chewable tablet 16 g, 4 tablet, Oral, PRN, Rick Goodrich MD    dextrose bolus 10% 125 mL, 125 mL, IntraVENous, PRN **OR** dextrose bolus 10% 250 mL, 250 mL,

## 2025-01-13 NOTE — PROGRESS NOTES
Comprehensive Nutrition Assessment    Type and Reason for Visit:  Initial, Positive nutrition screen    Nutrition Recommendations/Plan:   Recommend cardiac diet when able to advance.   Will order supplement when patient on po diet  Encourage good po intakes  Diabetes diet education, recommend outpatient referral  RD to follow/monitor      Malnutrition Assessment:  Malnutrition Status:  Mild malnutrition (01/13/25 1216)    Context:  Acute Illness     Findings of the 6 clinical characteristics of malnutrition:  Energy Intake:  No decrease in energy intake  Weight Loss:  No weight loss     Body Fat Loss:  Mild body fat loss     Muscle Mass Loss:  Mild muscle mass loss    Fluid Accumulation:  No fluid accumulation     Strength:       Nutrition Assessment:    Patient is a 79 year old woman who presents with pathological fracture of lumbar vertebra due to secondary osteoporosis. History of HTN. Patieht with history of alcoholism per chart review, fall may have possibly been related per MD notes. Patient is NPO at this time, she reports being hungry, willing to try Ensure when diet advances. She is currently NPO, awaiting POC. Notes discuss new DM diagnosis, A1c of 7.4%. Patient reports weight loss, # per patient, however EMR shows weight stable for the last 6 years. Labs, meds, PMH reviewed.    Nutrition Related Findings:              Current Nutrition Intake & Therapies:    Average Meal Intake: NPO  Average Supplements Intake: NPO  Diet NPO Exceptions are: Sips of Water with Meds    Anthropometric Measures:  Height: 165.1 cm (5' 5\")  Ideal Body Weight (IBW): 125 lbs (57 kg)       Current Body Weight: 53.1 kg (117 lb),   IBW.    Current BMI (kg/m2): 19.5                             BMI Categories: Underweight (BMI less than 22) age over 65    Estimated Daily Nutrient Needs:  Energy Requirements Based On: Kcal/kg  Weight Used for Energy Requirements: Current  Energy (kcal/day): 5671-4290  (30-35)  Weight Used

## 2025-01-13 NOTE — ANESTHESIA POSTPROCEDURE EVALUATION
Department of Anesthesiology  Postprocedure Note    Patient: Mela Wood  MRN: [id number]  YOB: 1945  Date of evaluation: 1/13/2025    Procedure Summary       Date: 01/13/25 Room / Location: [address]    Anesthesia Start: 1211 Anesthesia Stop:     Procedure: KYPHOPLASTY L1 WITH BIOPSY (Back) Diagnosis:       Compression fracture of L1 vertebra, sequela      (Compression fracture of L1 vertebra, sequela [S32.010S])    Surgeons: Malik Davalos MD Responsible Provider:     Anesthesia Type: MAC ASA Status: 3            Anesthesia Type: No value filed.    Anusha Phase I:      Anusha Phase II:      Anesthesia Post Evaluation    Patient location during evaluation: PACU  Patient participation: complete - patient participated  Level of consciousness: awake and alert  Airway patency: patent  Nausea & Vomiting: no nausea and no vomiting  Cardiovascular status: hemodynamically stable  Respiratory status: acceptable  Hydration status: stable  Pain management: adequate    No notable events documented.

## 2025-01-13 NOTE — PROGRESS NOTES
[facility]  Office: [phone]  Karl Ocampo DO, Arthur Cornejo DO, Daren Birmingham DO, Erwin Galarza DO, Kamran Vizcaino MD, Carlotta Adkins MD, Rick Goodrich MD, Concha Machuca MD,  Barry Curry MD, Parul Olivia MD, Greg Porras MD,  Ty Carr DO, Ghulam Garcia MD, Alpesh Medina MD, Blake Ocapmo DO, Barbara Flaherty MD,  Jose Stone DO, Ivett Keyes MD, Cassandra Mora MD, Dahlia Cheema MD, Hanna Domínguez MD,  Eric Mcgill MD, Nadege Morataya MD, Swathi Baxter MD, Madhav Gurrola MD, Jose Roberto MD, Santos Ramirez MD, Avi Victoria DO, Chris Desir MD, Ty Patton MD, Mohsin Reza, MD, Shirley Waterhouse, CNP,  Cynthia Bruce CNP, Avi aMtta, ANASTACIO,  Nahomy Parada, VICENTA, Carolina Elaine, ANASTACIO, Vianca Whalen, CNP, Joslyn Jeter, ANASTACIO, Kathryn Chacon, CNP, Brittany Bolden, PA-C, Leighann Heath PA-C, Lydia Alexander, CNP, Saida Reyes, CNP,  Kayla Brown, CNP, Joycelyn Oconnor, CNP, Celia Martínez, CNP,  Ruth Monte, ANASTACIO, Eboni Watson, CNP       [facility]      Daily Progress Note     Admit Date: 1/10/2025  Bed/Room No.  1025/1025-01  Admitting Physician : Rick Goodrich MD  Code Status :Limited  Hospital Day:  LOS: 3 days   Chief Complaint:     Chief Complaint   Patient presents with    Fall     Yesterday around 1600, found today. Denies LOC or thinners     Principal Problem:    Pathological fracture of lumbar vertebra due to secondary osteoporosis (HCC)  Active Problems:    Unwitnessed fall    Hyponatremia    Generalized weakness    Rhabdomyolysis    Urinary retention    Age-related osteoporosis with current pathological fracture    Intractable back pain    Atherosclerosis    Cervical strain, acute    Compression of lumbar vertebra (HCC)    Pneumonia of lower lobe due to infectious organism    Vitamin D deficiency  Resolved Problems:    * No resolved hospital problems. *    Subjective :        Interval History/Significant events :

## 2025-01-13 NOTE — PROGRESS NOTES
Progress Note    1/13/2025 7:18 AM  Subjective:   Admit Date: 1/10/2025  PCP: No primary care provider on file.  Date of Discharge: Unknown    Medications:   Scheduled Meds:   clindamycin (CLEOCIN) IV  600 mg IntraVENous Once    LORazepam  0.5 mg IntraVENous Once    atorvastatin  20 mg Oral Daily    losartan  25 mg Oral Daily    amLODIPine  10 mg Oral Daily    insulin lispro  0-4 Units SubCUTAneous 4x Daily AC & HS    metoprolol succinate  25 mg Oral Daily    sodium chloride flush  5-40 mL IntraVENous 2 times per day    enoxaparin  40 mg SubCUTAneous Daily    cefTRIAXone (ROCEPHIN) IV  1,000 mg IntraVENous Q24H    azithromycin  500 mg IntraVENous Q24H     Continuous Infusions:   dextrose      sodium chloride       PRN Meds:levalbuterol, sodium chloride nebulizer, morphine, glucose, dextrose bolus **OR** dextrose bolus, glucagon (rDNA), dextrose, hydrALAZINE, sodium chloride flush, sodium chloride, potassium chloride **OR** potassium alternative oral replacement **OR** potassium chloride, magnesium sulfate, ondansetron **OR** ondansetron, polyethylene glycol, acetaminophen **OR** acetaminophen    Diet:   Diet: Diet NPO Exceptions are: Sips of Water with Meds    Subjective:   Systemic or Specific Complaints:No Complaints    Objective:     Patient Vitals for the past 24 hrs:   BP Temp Temp src Pulse Resp SpO2 Weight   01/13/25 0400 (!) 166/96 99 °F (37.2 °C) Oral -- 20 94 % --   01/13/25 0246 -- -- -- -- -- 91 % 53.4 kg (117 lb 11.6 oz)   01/13/25 0245 -- -- -- -- -- (!) 88 % --   01/13/25 0200 -- -- -- -- -- 94 % --   01/13/25 0138 (!) 165/89 99 °F (37.2 °C) Oral 93 20 95 % --   01/13/25 0000 -- -- -- -- -- 94 % --   01/12/25 2000 (!) 162/101 99.7 °F (37.6 °C) Oral 94 -- 97 % --   01/12/25 1516 (!) 149/75 98.1 °F (36.7 °C) Oral 86 16 98 % --   01/12/25 1215 (!) 167/80 -- -- -- -- -- --   01/12/25 1135 (!) 176/87 97.9 °F (36.6 °C) Oral 94 18 93 % --   01/12/25 0721 (!) 151/77 98.8 °F (37.1 °C) Oral 97 18 97 % --

## 2025-01-13 NOTE — PROGRESS NOTES
[facility]  Speech Language Pathology    Date: 1/13/2025  Patient Name: Mela Wood  YOB: 1945   AGE: 79 y.o.  MRN: [id number]        Patient Not Available for Speech Therapy     Due to:  [] Testing  [] Hemodialysis  [] Cancelled by RN  [x] Surgery   [] Intubation/Sedation/Pain Medication  [] Medical instability  [x] Other: RN reports coughing with water/meds this morning, yesterday's RN also reported coughing with PO. Pt States she does experience some coughing with oral intake. Recommend Modified Barium Swallow Study, orderplaced                                                                                                                                                                     Next scheduled treatment: 1/14/25  Completed by: Carol Stanford, SLP, M.S. LOGAN-SLP

## 2025-01-13 NOTE — CARE COORDINATION
Discharge planning    Chart reviewed. Patient lives alone. Using friends RW. No PCP unable to provide HC unless lined up with visiting MD RASHID faxed referral to [facility] and they accepted. Will be pre cert     Admitted with unwitnessed fall. L2 compression fx and ortho rec kyphoplasty. Scheduled at noon.

## 2025-01-13 NOTE — OP NOTE
Operative Note      Patient: Mela Wood  YOB: 1945  MRN: [id number]    Date of Procedure: 1/13/2025    Pre-Op Diagnosis Codes:      * Compression fracture of L1 vertebra, sequela [S32.010S]    Post-Op Diagnosis: Same       Procedure(s):  KYPHOPLASTY L1 WITH BIOPSY    Surgeon(s):  Malik Davalos MD    Assistant:   * No surgical staff found *    Anesthesia: General    Estimated Blood Loss (mL): Minimal    Complications: None    Specimens:   ID Type Source Tests Collected by Time Destination   A : LUMBAR BONE BIOPSY Bone Back SURGICAL PATHOLOGY Malik Davalos MD 1/13/2025 1234        Implants:  Implant Name Type Inv. Item Serial No.  Lot No. LRB No. Used Action   CEMENT BNE HI VISC RADIOPAQUE KYPHON HV-R - [id number]  CEMENT BNE HI VISC RADIOPAQUE KYPHON HV-R  [employer] DANEK-WD [id number] N/A 1 Implanted         Drains:   Urinary Catheter 01/10/25 Coude (Active)   $ Urethral catheter insertion $ Not inserted for procedure 01/12/25 1945   Catheter Indications Urinary retention (acute or chronic), continuous bladder irrigation or bladder outlet obstruction 01/13/25 1200   Site Assessment Pink 01/13/25 0857   Urine Color Yellow 01/13/25 0857   Urine Appearance Clear 01/13/25 0857   Collection Container Standard 01/13/25 1200   Securement Method Securing device (Describe) 01/13/25 1200   Catheter Care  Other (comment) 01/12/25 1108   Catheter Best Practices  Drainage tube clipped to bed;Catheter secured to thigh;Tamper seal intact;Bag below bladder;Bag not on floor;Lack of dependent loop in tubing;Drainage bag less than half full 01/13/25 1200   Status Draining;Patent 01/13/25 1200   Output (mL) 525 mL 01/13/25 0857       Findings:  Infection Present At Time Of Surgery (PATOS) (choose all levels that have infection present):  No infection present  Other Findings: See dictation    Detailed Description of Procedure:   See dictation    Electronically signed by Malik Davalos MD on  1/13/2025 at 12:43 PM

## 2025-01-13 NOTE — PROGRESS NOTES
Occupational Therapy  [facility]  Occupational Therapy Not Seen Note    Patient not available for Occupational Therapy due to:    [] Testing:    [] Hemodialysis    [] Cancelled by RN:    []Refusal by Patient:    [] Surgery:     [] Intubation:     [] Pain Medication:    [] Sedation:     [] Spine Precautions :    [] Medical Instability:    [x] Other:1/13 cx eval as pt on strict BR and plan to do kypho per chart; continue to follow

## 2025-01-13 NOTE — CARE COORDINATION
Social Work-Met with patient to discuss dc options. Discussed short term rehab/ She is agreeable. She would like  to contact her sister, Araceli. Spoke with Araceli.                    Post Acute Facility/Agency List     Provided  sister, Araceli  with the following list, the list includes the overall star ratings obtained from CMS per the Medicare Web site (www.Medicare.gov):     [] Long Term Acute Care Facilities  [] Acute Inpatient Rehabilitation Facilities  [x] Skilled Nursing Facilities  [] Hospice Facilities  [] Home Care    Provided verbal instructions on how to utilize the QR Code to obtain additional detailed star ratings from www.Medicare.gov     offered to print and provide the detailed list:    []Accepted   [x]Declined    She is requesting a referral to [facility]. Sent referral. Edu

## 2025-01-13 NOTE — PROGRESS NOTES
Maximilian Regalado MD   Urology Progress Note            Subjective:  follow-up urinary retention difficult catheterization    Patient Vitals for the past 24 hrs:   BP Temp Temp src Pulse Resp SpO2 Weight   01/13/25 0400 (!) 166/96 99 °F (37.2 °C) Oral -- 20 94 % --   01/13/25 0246 -- -- -- -- -- 91 % 53.4 kg (117 lb 11.6 oz)   01/13/25 0245 -- -- -- -- -- (!) 88 % --   01/13/25 0200 -- -- -- -- -- 94 % --   01/13/25 0138 (!) 165/89 99 °F (37.2 °C) Oral 93 20 95 % --   01/13/25 0000 -- -- -- -- -- 94 % --   01/12/25 2000 (!) 162/101 99.7 °F (37.6 °C) Oral 94 -- 97 % --   01/12/25 1516 (!) 149/75 98.1 °F (36.7 °C) Oral 86 16 98 % --   01/12/25 1215 (!) 167/80 -- -- -- -- -- --   01/12/25 1135 (!) 176/87 97.9 °F (36.6 °C) Oral 94 18 93 % --   01/12/25 0721 (!) 151/77 98.8 °F (37.1 °C) Oral 97 18 97 % --       Intake/Output Summary (Last 24 hours) at 1/13/2025 0644  Last data filed at 1/12/2025 1723  Gross per 24 hour   Intake --   Output 325 ml   Net -325 ml       Recent Labs     01/10/25  1405 01/11/25  0525 01/13/25  0513   WBC 9.6 5.8 5.9   HGB 14.3 11.4* 12.3   HCT 40.9 32.5* 34.9*   MCV 84.0 84.4 84.1    258 290     Recent Labs     01/10/25  1405 01/10/25  2257 01/11/25  0525 01/11/25  1029 01/11/25  2215 01/12/25  0429 01/13/25  0513   *   < > 125*   < > 128* 128* 129*   K 4.2  --  4.0  --   --   --  3.9   CL 86*  --  92*  --   --   --  95*   CO2 20  --  18*  --   --   --  22   BUN 16  --  15  --   --   --  9   CREATININE 0.8  --  0.7  --   --   --  0.6    < > = values in this interval not displayed.       Recent Labs     01/10/25  1950   COLORU Yellow   PHUR 6.0   WBCUA 2 TO 5   RBCUA 0 TO 2   MUCUS 2+*   BACTERIA RARE*   LEUKOCYTESUR NEGATIVE   UROBILINOGEN Normal   BILIRUBINUR NEGATIVE  Verified by ictotest.*       Additional Lab/culture results:    Physical Exam:  patient not in acute distress, tolerating the catheter well urine dark ana no bladder spasms patient is

## 2025-01-13 NOTE — PROGRESS NOTES
Physical Therapy    DATE: 2025    NAME: Mela Wood  MRN: [id number]   : 1945    Patient not seen this date for Physical Therapy due to:      [] Cancel by RN or physician due to:    [] Hemodialysis    [] Critical Lab Value Level     [] Blood transfusion in progress    [] Acute or unstable cardiovascular status   _MAP < 55 or more than >115  _HR < 40 or > 130    [] Acute or unstable pulmonary status   -FiO2 > 60%   _RR < 5 or >40    _O2 sats < 85%    [x] CX  for cont'd STRICT BR and plan to do kyphoplasty today or tomorrow    [] Off Unit for surgery or procedure    [] Off Unit for testing       [] Pending imaging to R/O fracture    [] Refusal by Patient      [] Other      [] PT being discontinued at this time. Patient independent. No further needs.     [] PT being discontinued at this time as the patient has been transferred to hospice care. No further needs.      KAYLA RIVERA, PT

## 2025-01-13 NOTE — ANESTHESIA PRE PROCEDURE
(TOPROL XL) extended release tablet 25 mg  25 mg Oral Daily Rick Goodrich MD   25 mg at 01/13/25 0854    [Transfer Hold] hydrALAZINE (APRESOLINE) injection 5 mg  5 mg IntraVENous Q6H PRN Rick Goodrich MD   5 mg at 01/11/25 2320    [Transfer Hold] sodium chloride flush 0.9 % injection 5-40 mL  5-40 mL IntraVENous 2 times per day Joycelyn Oconnor APRN - CNP   10 mL at 01/13/25 0854    [Transfer Hold] sodium chloride flush 0.9 % injection 10 mL  10 mL IntraVENous PRN Joycelyn Oconnor APRN - CNP        [Transfer Hold] 0.9 % sodium chloride infusion   IntraVENous PRN Joycelyn Oconnor APRN - CNP        [Transfer Hold] potassium chloride (KLOR-CON M) extended release tablet 40 mEq  40 mEq Oral PRN Joycelyn Oconnor APRN - CNP        Or    [Transfer Hold] potassium bicarb-citric acid (EFFER-K) effervescent tablet 40 mEq  40 mEq Oral PRN Joycelyn Oconnor APRN - CNP        Or    [Transfer Hold] potassium chloride 10 mEq/100 mL IVPB (Peripheral Line)  10 mEq IntraVENous PRN Joycelyn Oconnor APRN - CNP        [Transfer Hold] magnesium sulfate 1000 mg in dextrose 5% 100 mL IVPB  1,000 mg IntraVENous PRN Joycelyn Oconnor APRN - CNP        [Transfer Hold] enoxaparin (LOVENOX) injection 40 mg  40 mg SubCUTAneous Daily Joycelyn Oconnor APRN - CNP   40 mg at 01/12/25 0821    [Transfer Hold] ondansetron (ZOFRAN-ODT) disintegrating tablet 4 mg  4 mg Oral Q8H PRN Joycelyn Oconnor APRN - CNP        Or    [Transfer Hold] ondansetron (ZOFRAN) injection 4 mg  4 mg IntraVENous Q6H PRN Joycelyn Oconnor APRN - CNP        [Transfer Hold] polyethylene glycol (GLYCOLAX) packet 17 g  17 g Oral Daily PRN Joycelyn Oconnor APRN - CNP        [Transfer Hold] acetaminophen (TYLENOL) tablet 650 mg  650 mg Oral Q6H PRN Joycelyn Oconnor APRN - CNP   650 mg at 01/11/25 1444    Or    [Transfer Hold] acetaminophen (TYLENOL) suppository 650 mg  650 mg Rectal Q6H PRN Joycelyn Oconnor APRN - CNP        [Transfer Hold] cefTRIAXone (ROCEPHIN) 1,000 mg in sterile water 10 mL IV syringe  1,000 mg

## 2025-01-14 ENCOUNTER — APPOINTMENT (OUTPATIENT)
Dept: GENERAL RADIOLOGY | Age: 80
DRG: 477 | End: 2025-01-14
Payer: MEDICARE

## 2025-01-14 PROBLEM — R62.7 FAILURE TO THRIVE IN ADULT: Status: ACTIVE | Noted: 2025-01-14

## 2025-01-14 PROBLEM — E43 SEVERE MALNUTRITION (HCC): Status: ACTIVE | Noted: 2018-12-19

## 2025-01-14 LAB
ANION GAP SERPL CALCULATED.3IONS-SCNC: 14 MMOL/L (ref 9–16)
BUN SERPL-MCNC: 10 MG/DL (ref 8–23)
CALCIUM SERPL-MCNC: 9 MG/DL (ref 8.8–10.2)
CHLORIDE SERPL-SCNC: 93 MMOL/L (ref 98–107)
CO2 SERPL-SCNC: 21 MMOL/L (ref 20–31)
CREAT SERPL-MCNC: 0.5 MG/DL (ref 0.5–0.9)
ERYTHROCYTE [DISTWIDTH] IN BLOOD BY AUTOMATED COUNT: 11.7 % (ref 11.8–14.4)
GFR, ESTIMATED: >90 ML/MIN/1.73M2
GLUCOSE BLD-MCNC: 171 MG/DL (ref 65–105)
GLUCOSE BLD-MCNC: 244 MG/DL (ref 65–105)
GLUCOSE BLD-MCNC: 261 MG/DL (ref 65–105)
GLUCOSE BLD-MCNC: 287 MG/DL (ref 65–105)
GLUCOSE SERPL-MCNC: 155 MG/DL (ref 82–115)
HCT VFR BLD AUTO: 38.4 % (ref 36.3–47.1)
HGB BLD-MCNC: 13.4 G/DL (ref 11.9–15.1)
MAGNESIUM SERPL-MCNC: 1.7 MG/DL (ref 1.6–2.4)
MCH RBC QN AUTO: 29.5 PG (ref 25.2–33.5)
MCHC RBC AUTO-ENTMCNC: 34.9 G/DL (ref 28.4–34.8)
MCV RBC AUTO: 84.6 FL (ref 82.6–102.9)
NRBC BLD-RTO: 0 PER 100 WBC
PLATELET # BLD AUTO: 314 K/UL (ref 138–453)
PMV BLD AUTO: 10.4 FL (ref 8.1–13.5)
POTASSIUM SERPL-SCNC: 3.5 MMOL/L (ref 3.7–5.3)
RBC # BLD AUTO: 4.54 M/UL (ref 3.95–5.11)
SODIUM SERPL-SCNC: 127 MMOL/L (ref 136–145)
WBC OTHER # BLD: 6.9 K/UL (ref 3.5–11.3)

## 2025-01-14 PROCEDURE — 85027 COMPLETE CBC AUTOMATED: CPT

## 2025-01-14 PROCEDURE — 97167 OT EVAL HIGH COMPLEX 60 MIN: CPT

## 2025-01-14 PROCEDURE — 97110 THERAPEUTIC EXERCISES: CPT

## 2025-01-14 PROCEDURE — 80048 BASIC METABOLIC PNL TOTAL CA: CPT

## 2025-01-14 PROCEDURE — 97535 SELF CARE MNGMENT TRAINING: CPT

## 2025-01-14 PROCEDURE — 0QB00ZX EXCISION OF LUMBAR VERTEBRA, OPEN APPROACH, DIAGNOSTIC: ICD-10-PCS | Performed by: ORTHOPAEDIC SURGERY

## 2025-01-14 PROCEDURE — 0QS03ZZ REPOSITION LUMBAR VERTEBRA, PERCUTANEOUS APPROACH: ICD-10-PCS | Performed by: ORTHOPAEDIC SURGERY

## 2025-01-14 PROCEDURE — 97530 THERAPEUTIC ACTIVITIES: CPT

## 2025-01-14 PROCEDURE — 6370000000 HC RX 637 (ALT 250 FOR IP): Performed by: ORTHOPAEDIC SURGERY

## 2025-01-14 PROCEDURE — 0QU03JZ SUPPLEMENT LUMBAR VERTEBRA WITH SYNTHETIC SUBSTITUTE, PERCUTANEOUS APPROACH: ICD-10-PCS | Performed by: ORTHOPAEDIC SURGERY

## 2025-01-14 PROCEDURE — 0T9B70Z DRAINAGE OF BLADDER WITH DRAINAGE DEVICE, VIA NATURAL OR ARTIFICIAL OPENING: ICD-10-PCS | Performed by: ORTHOPAEDIC SURGERY

## 2025-01-14 PROCEDURE — 99233 SBSQ HOSP IP/OBS HIGH 50: CPT | Performed by: NURSE PRACTITIONER

## 2025-01-14 PROCEDURE — 2060000000 HC ICU INTERMEDIATE R&B

## 2025-01-14 PROCEDURE — 99232 SBSQ HOSP IP/OBS MODERATE 35: CPT | Performed by: STUDENT IN AN ORGANIZED HEALTH CARE EDUCATION/TRAINING PROGRAM

## 2025-01-14 PROCEDURE — 6370000000 HC RX 637 (ALT 250 FOR IP): Performed by: STUDENT IN AN ORGANIZED HEALTH CARE EDUCATION/TRAINING PROGRAM

## 2025-01-14 PROCEDURE — 92611 MOTION FLUOROSCOPY/SWALLOW: CPT

## 2025-01-14 PROCEDURE — 2500000003 HC RX 250 WO HCPCS: Performed by: ORTHOPAEDIC SURGERY

## 2025-01-14 PROCEDURE — 97163 PT EVAL HIGH COMPLEX 45 MIN: CPT

## 2025-01-14 PROCEDURE — 83735 ASSAY OF MAGNESIUM: CPT

## 2025-01-14 PROCEDURE — 74230 X-RAY XM SWLNG FUNCJ C+: CPT

## 2025-01-14 PROCEDURE — 97112 NEUROMUSCULAR REEDUCATION: CPT

## 2025-01-14 PROCEDURE — 36415 COLL VENOUS BLD VENIPUNCTURE: CPT

## 2025-01-14 PROCEDURE — 6360000002 HC RX W HCPCS: Performed by: ORTHOPAEDIC SURGERY

## 2025-01-14 PROCEDURE — 82947 ASSAY GLUCOSE BLOOD QUANT: CPT

## 2025-01-14 RX ORDER — MIRTAZAPINE 7.5 MG/1
7.5 TABLET, FILM COATED ORAL NIGHTLY
Status: DISCONTINUED | OUTPATIENT
Start: 2025-01-14 | End: 2025-01-15 | Stop reason: HOSPADM

## 2025-01-14 RX ORDER — AZITHROMYCIN 250 MG/1
500 TABLET, FILM COATED ORAL DAILY
Status: COMPLETED | OUTPATIENT
Start: 2025-01-14 | End: 2025-01-14

## 2025-01-14 RX ADMIN — SODIUM CHLORIDE, PRESERVATIVE FREE 10 ML: 5 INJECTION INTRAVENOUS at 21:12

## 2025-01-14 RX ADMIN — AZITHROMYCIN DIHYDRATE 500 MG: 250 TABLET ORAL at 14:39

## 2025-01-14 RX ADMIN — CHOLECALCIFEROL TAB 125 MCG (5000 UNIT) 5000 UNITS: 125 TAB at 10:10

## 2025-01-14 RX ADMIN — ATORVASTATIN CALCIUM 20 MG: 20 TABLET, FILM COATED ORAL at 21:11

## 2025-01-14 RX ADMIN — LOSARTAN POTASSIUM 25 MG: 25 TABLET, FILM COATED ORAL at 10:10

## 2025-01-14 RX ADMIN — INSULIN GLARGINE 15 UNITS: 100 INJECTION, SOLUTION SUBCUTANEOUS at 21:12

## 2025-01-14 RX ADMIN — METOPROLOL SUCCINATE 25 MG: 25 TABLET, EXTENDED RELEASE ORAL at 10:10

## 2025-01-14 RX ADMIN — INSULIN LISPRO 2 UNITS: 100 INJECTION, SOLUTION INTRAVENOUS; SUBCUTANEOUS at 21:11

## 2025-01-14 RX ADMIN — WATER 1000 MG: 1 INJECTION INTRAMUSCULAR; INTRAVENOUS; SUBCUTANEOUS at 14:39

## 2025-01-14 RX ADMIN — HYDROCODONE BITARTRATE AND ACETAMINOPHEN 1 TABLET: 5; 325 TABLET ORAL at 08:12

## 2025-01-14 RX ADMIN — MIRTAZAPINE 7.5 MG: 7.5 TABLET, FILM COATED ORAL at 21:11

## 2025-01-14 RX ADMIN — AMLODIPINE BESYLATE 10 MG: 10 TABLET ORAL at 10:10

## 2025-01-14 RX ADMIN — SODIUM CHLORIDE, PRESERVATIVE FREE 10 ML: 5 INJECTION INTRAVENOUS at 10:14

## 2025-01-14 RX ADMIN — INSULIN LISPRO 1 UNITS: 100 INJECTION, SOLUTION INTRAVENOUS; SUBCUTANEOUS at 11:58

## 2025-01-14 ASSESSMENT — ENCOUNTER SYMPTOMS
RHINORRHEA: 0
NAUSEA: 0
BACK PAIN: 0
VOMITING: 0
ABDOMINAL PAIN: 0
SHORTNESS OF BREATH: 0
CHEST TIGHTNESS: 0
COUGH: 0
WHEEZING: 0
BLOOD IN STOOL: 0
DIARRHEA: 0
CONSTIPATION: 0

## 2025-01-14 ASSESSMENT — PAIN DESCRIPTION - ONSET: ONSET: ON-GOING

## 2025-01-14 ASSESSMENT — PAIN DESCRIPTION - ORIENTATION: ORIENTATION: MID;LOWER

## 2025-01-14 ASSESSMENT — PAIN SCALES - GENERAL
PAINLEVEL_OUTOF10: 7
PAINLEVEL_OUTOF10: 3

## 2025-01-14 ASSESSMENT — PAIN DESCRIPTION - PAIN TYPE: TYPE: ACUTE PAIN

## 2025-01-14 ASSESSMENT — PAIN DESCRIPTION - FREQUENCY: FREQUENCY: INTERMITTENT

## 2025-01-14 ASSESSMENT — PAIN DESCRIPTION - LOCATION: LOCATION: BACK

## 2025-01-14 ASSESSMENT — PAIN - FUNCTIONAL ASSESSMENT: PAIN_FUNCTIONAL_ASSESSMENT: PREVENTS OR INTERFERES SOME ACTIVE ACTIVITIES AND ADLS

## 2025-01-14 ASSESSMENT — PAIN DESCRIPTION - DESCRIPTORS: DESCRIPTORS: ACHING;DULL

## 2025-01-14 NOTE — PROGRESS NOTES
Physician Progress Note      PATIENT:               TICO THIBODEAUX  CSN #:                  [id number]  :                       1945  ADMIT DATE:       1/10/2025 11:43 AM  DISCH DATE:  RESPONDING  PROVIDER #:        Jose Roberto MD          QUERY TEXT:    Patient admitted with L1 compression fracture s/p fall.  Pt noted to have mild   body fat loss and mild muscle mass loss.  If possible, please document in   progress notes and discharge summary if you are evaluating and /or treating   any of the following:    The medical record reflects the following:  Risk Factors: age 79, hx alcoholism, inadequate oral intake, new onset DM 2,   PNA  Clinical Indicators: mild body fat loss and mild muscle mass loss; BMI 19;   hyponatremia; Recurrent falls/weakness  Treatment: dietitian consult, cardiac diet when able to advance, supplements   to be ordered when on po diet, encourage good po intake, diabetes diet   education, I&Os, vitamin D, IVF, labs    ASPEN Criteria:    https://aspenjournals.onlinelibrary.inman.com/doi/full/10.1177/[email or web address]  5  Options provided:  -- Protein calorie malnutrition moderate  -- Other - I will add my own diagnosis  -- Disagree - Not applicable / Not valid  -- Disagree - Clinically unable to determine / Unknown  -- Refer to Clinical Documentation Reviewer    PROVIDER RESPONSE TEXT:    This patient has moderate protein calorie malnutrition.    Query created by: Nahomy Moyer on 2025 8:39 AM      Electronically signed by:  Jose Roberto MD 2025 8:57 AM

## 2025-01-14 NOTE — OP NOTE
[place] [address]                            OPERATIVE REPORT      PATIENT NAME: TICO THIBODEAUX        : 1945  MED REC NO: [id number]                         ROOM: [facility]  ACCOUNT NO: [id number]                       ADMIT DATE: 01/10/2025  PROVIDER: Malik Davalos MD      DATE OF PROCEDURE:  2025    SURGEON:  Malik Davalos MD    PREOPERATIVE DIAGNOSIS:  Pathological osteoporotic L1 compression fracture.    POSTOPERATIVE DIAGNOSIS:  Pathological osteoporotic L1 compression fracture.    ADDITIONAL DIAGNOSIS:  None.    PROCEDURES:    1. Kyphoplasty with vertebral augmentation of pathological L1 compression fracture.  Anesthesia by surgeon, 64 modifier.  2. Regional Marcaine lumbar block (75493).  3. Biopsy of pathological L1 compression fracture.  4. AP and lateral fluoroscopy of lumbar spine, 27558-22.  5. Less than 1 hour physician intraoperative C-arm interpretation, CPT code 50885.    ESTIMATED BLOOD LOSS:  5 mL.    INDICATIONS:  The patient is a 79-year-old who required admission to [facility] via the emergency room after standing height fall in her kitchen.  911 was contacted.  Imaging included CTs identifying an acute fragility fracture at L1.  This was confirmed on MRI.  Treatment options were outlined.  Nonoperative care via TLSO, pain control versus surgery.  Kyphoplasty with vertebral augmentation, biopsy was recommended to decrease pain, decrease kyphotic deformity, eliminate the need for spinal orthosis, and obtain tissue diagnosis.  Risks of procedure including infection, phlebitis, neurovascular or tendon injury, aesthetic complications, failure of procedure, new fractures amongst others were discussed and accepted.    Her past orthopedic history also includes compression fracture treated nonoperatively at T6 with thoracic kyphosis.    DESCRIPTION OF PROCEDURE:  The patient was brought to  the operative room and given MAC sedation.  She had been on IV Rocephin.  She was carefully rotated to the prone position on a well-padded Alexis frame.  Two C-arms were brought in.  Two AP and 2 lateral images were obtained of the L1 fracture.  She also has associated osteoarthritis, DDD, facet arthropathy, and scoliosis.  The spine was scrubbed, prepped, and draped in routine sterile fashion with ChloraPrep.    The regional block was performed at this point.  It involved a total of 30 mL of 0.5% Marcaine.  Skin, subcutaneous tissue, paraspinal musculatures infiltrated down to the posterior elements.  This was followed by an 11 blade and a bevel tip 15-3 trocar.  The coronal dimensions of the L1 pedicle were wide and could accommodate the implant.  Once the body was encountered, a diagnostic trabecular biopsy was obtained through a cannula trocar.  This was followed by a hand drill created a tract for the IBTs.  They were inserted and inflated to 3.5 mL with definite correction of anterior column height loss.    AP and lateral fluoroscopy was used throughout the operative procedure.  The images were interpreted by myself regarding fracture reduction and implant position.    On the back table, methylmethacrylate was prepared.  It was warmed to proper consistency.  It was then carefully backfilled in the void created by the balloons.  The fill was excellent throughout the anterior column superior to inferior endplate without canal extrusions.  Satisfied with the procedure, the portals were closed with 3-0 nylon.  A fluff, ABD dressing was secured.  Having tolerated the procedure well, the patient was transferred to recovery room in stable condition.          KARL QUEEN MD      D:  01/13/2025 12:47:56     T:  01/13/2025 18:01:05     JINA/SELENA  Job #:  [id number]     Doc#:  [id number]    CC:   MD Yovani Foster MD

## 2025-01-14 NOTE — PROGRESS NOTES
Maximilian Regalado MD   Urology Progress Note            Subjective:  follow-up  neurogenic bladder with urinary retention    Patient Vitals for the past 24 hrs:   BP Temp Temp src Pulse Resp SpO2 Weight   01/14/25 0350 (!) 165/90 98.8 °F (37.1 °C) -- 91 17 90 % --   01/14/25 0001 -- -- -- -- -- -- 52.4 kg (115 lb 8 oz)   01/13/25 2358 (!) 154/89 98.1 °F (36.7 °C) -- 85 18 92 % --   01/13/25 2011 (!) 155/93 97.9 °F (36.6 °C) -- 85 17 90 % --   01/13/25 1542 (!) 156/81 -- Axillary 80 18 95 % --   01/13/25 1354 139/72 97.3 °F (36.3 °C) Oral 70 23 94 % --   01/13/25 1326 124/65 97.3 °F (36.3 °C) -- -- -- 94 % --   01/13/25 1325 114/66 -- -- 74 20 91 % --   01/13/25 1315 (!) 120/55 -- -- 74 25 93 % --   01/13/25 1300 120/68 -- -- 79 27 94 % --   01/13/25 1254 127/68 97.3 °F (36.3 °C) Temporal 82 28 94 % --   01/13/25 1131 (!) 141/82 97.9 °F (36.6 °C) Oral 80 18 94 % --   01/13/25 0945 (!) 148/76 -- -- 83 -- -- --   01/13/25 0806 (!) 180/89 98.6 °F (37 °C) Oral 88 16 94 % --       Intake/Output Summary (Last 24 hours) at 1/14/2025 0524  Last data filed at 1/14/2025 0002  Gross per 24 hour   Intake 450 ml   Output 1225 ml   Net -775 ml       Recent Labs     01/11/25  0525 01/13/25  0513   WBC 5.8 5.9   HGB 11.4* 12.3   HCT 32.5* 34.9*   MCV 84.4 84.1    290     Recent Labs     01/11/25  0525 01/11/25  1029 01/11/25  2215 01/12/25  0429 01/13/25  0513   *   < > 128* 128* 129*   K 4.0  --   --   --  3.9   CL 92*  --   --   --  95*   CO2 18*  --   --   --  22   BUN 15  --   --   --  9   CREATININE 0.7  --   --   --  0.6    < > = values in this interval not displayed.       No results for input(s): \"COLORU\", \"PHUR\", \"LABCAST\", \"WBCUA\", \"RBCUA\", \"MUCUS\", \"TRICHOMONAS\", \"YEAST\", \"BACTERIA\", \"CLARITYU\", \"SPECGRAV\", \"LEUKOCYTESUR\", \"UROBILINOGEN\", \"BILIRUBINUR\", \"BLOODU\" in the last 72 hours.    Invalid input(s): \"NITRATE\", \"GLUCOSEUKETONESUAMORPHOUS\"    Additional Lab/culture

## 2025-01-14 NOTE — PROGRESS NOTES
frequency and purpose, completed 10 reps  Disease-specific Exercises: ED on importance being up & moving, & to be up in chair at least 2x/D to reduce SEDENTARY & POST OP complications: INC risk for blood clots, pneumonia, constipation, muscle atrophy, and decreased independence.     Return from 2757-8617:  Bed mobility  Rolling to Left: MOD assistance;2 Person assistance  Supine to Sit: MOD assistance;2 Person assistance    Scooting: MOD;2 Person assistance  Bed Mobility Comments: MAX verbal instruction/tactile assist to initiate movements, for proper log rolling technique, and use of UB, how to drop legs off EOB & use UB to come to sit in order to adhere to spinal precautions, MAX verbal instruction/tactile assist for flexing knees and proper tech to adhere to spinal precautions, pursed lip breathing, slowing down movements & pacing, assist with line mgt, with increased time/EFFORT needed to inc safety/reduce fall risk  Transfers  Sit to Stand: MOD Assistance;2 Person Assistance  Stand to Sit: MOD Assistance;2 Person Assistance  Bed to Chair: Dependent/Total  Lateral Transfers: Dependent/Total  Comment: MAX VC + tactile assist on correct use of upper body for safe sit/stand, nose over toes tech, upright posture, Required INGRID STEDY, placed & stood into device with 2MAX assist, mobilized to RECLINER CHAIR,  & sat from device with 2MOD assist.  Pt required MOD cues/tactile assist for B hand placement on INGRID STEDY as well as reaching back, safety features of lift, upright posture and weight shifting, pursed lip breathing, controlled stand to sit to increase safety/reduce falls     OutComes Score         Single Leg Stance Test:  0 second,  (<5 seconds = INC FALL RISK)                                                 AM-PAC - Mobility    AM-PAC Basic Mobility - Inpatient   How much help is needed turning from your back to your side while in a flat bed without using bedrails?: A Lot  How much help is needed moving from  lying on your back to sitting on the side of a flat bed without using bedrails?: A Lot  How much help is needed moving to and from a bed to a chair?: Total  How much help is needed standing up from a chair using your arms?: A Lot  How much help is needed walking in hospital room?: Total  How much help is needed climbing 3-5 steps with a railing?: Total  AM-PAC Inpatient Mobility Raw Score : 9  AM-[facility] Inpatient T-Scale Score : 30.55  Mobility Inpatient CMS 0-100% Score: 81.38  Mobility Inpatient CMS G-Code Modifier : CM         Tinneti Score       Goals  Short Term Goals  Time Frame for Short Term Goals: 6 visits  Short Term Goal 1: Inc bed-mobility to enable pt to safely get in/OOB INDEP with spinal precautions, & Indep transfers safely  Short Term Goal 2: Inc gait to amb 150ft or > indep w/ RW to enable pt to return to previous level of independence, promotion of home walking program & able to demonstrate indep/ safe use of RW in functional activities including approaching surfaces and turning to sit.  Short Term Goal 3: Pt able to tolerate 30 min of endurance and functional mobility to facilitate activity tolerance to WFL  and prevent post op complications  Short Term Goal 4: Inc standing balance to good with device to facilitate pt independence for performance of ADL's & functional mobility, & reduce fall risk  Short Term Goal 5: Ed spinal precautions, bed mobility within spinal precautions, posture, proper pacing, safety and fall prevention, importance to be up hourly while awake after D/C for prevention POST OP complications,& Home walking program, & issue written Pt ED  Patient Goals   Patient Goals : able to return to my apartment & be independent again       Education  Patient Education  Education Given To: Patient  Education Provided: Role of Therapy;Plan of Care;Precautions;Transfer Training;Equipment;Fall Prevention Strategies  Education Provided Comments: Pt educated on purpose of PT eval, importance of

## 2025-01-14 NOTE — PROGRESS NOTES
Patient having pain on their back and rates it a 7. Pain interventions includefrequent position changes, correct body alignment/body mechanics, medication, pillow support/positioning, and regular rest periods. Patients goal for pain relief is 3. The need for pain and symptom management will be considered in the discharge planning process to ensure patients comfort.

## 2025-01-14 NOTE — PROGRESS NOTES
Post Operative Progress Note    1/14/2025 9:12 AM  Subjective:   Admit Date: 1/10/2025  PCP: No primary care provider on file.  Date of Discharge: Per hospitalist    Medications:   Scheduled Meds:   azithromycin  500 mg Oral Daily    vitamin D  50,000 Units Oral Weekly    vitamin D3  5,000 Units Oral Daily    insulin glargine  15 Units SubCUTAneous Nightly    clindamycin (CLEOCIN) IV  600 mg IntraVENous Once    atorvastatin  20 mg Oral Daily    losartan  25 mg Oral Daily    amLODIPine  10 mg Oral Daily    insulin lispro  0-4 Units SubCUTAneous 4x Daily AC & HS    metoprolol succinate  25 mg Oral Daily    sodium chloride flush  5-40 mL IntraVENous 2 times per day    cefTRIAXone (ROCEPHIN) IV  1,000 mg IntraVENous Q24H     Continuous Infusions:   dextrose      sodium chloride       PRN Meds:HYDROcodone-acetaminophen, levalbuterol, sodium chloride nebulizer, morphine, glucose, dextrose bolus **OR** dextrose bolus, glucagon (rDNA), dextrose, hydrALAZINE, sodium chloride flush, sodium chloride, potassium chloride **OR** potassium alternative oral replacement **OR** potassium chloride, magnesium sulfate, ondansetron **OR** ondansetron, polyethylene glycol, acetaminophen **OR** acetaminophen    Diet:   Diet: ADULT DIET; Regular    Subjective:   Systemic or Specific Complaints:No Complaints    Objective:     Patient Vitals for the past 24 hrs:   BP Temp Temp src Pulse Resp SpO2 Weight   01/14/25 0848 (!) 142/100 97.3 °F (36.3 °C) -- 98 -- (!) 89 % --   01/14/25 0350 (!) 165/90 98.8 °F (37.1 °C) -- 91 17 90 % --   01/14/25 0001 -- -- -- -- -- -- 52.4 kg (115 lb 8 oz)   01/13/25 2358 (!) 154/89 98.1 °F (36.7 °C) -- 85 18 92 % --   01/13/25 2011 (!) 155/93 97.9 °F (36.6 °C) -- 85 17 90 % --   01/13/25 1542 (!) 156/81 -- Axillary 80 18 95 % --   01/13/25 1354 139/72 97.3 °F (36.3 °C) Oral 70 23 94 % --   01/13/25 1326 124/65 97.3 °F (36.3 °C) -- -- -- 94 % --   01/13/25 1325 114/66 -- -- 74 20 91 % --   01/13/25 1315 (!) 120/55

## 2025-01-14 NOTE — PLAN OF CARE
Pt resting comfortably overnight with no complaints of pain.   Pt continues to need 2L NC  Pt coughing with pills, barium swallow today.   Dawkins remains in place with adequate output.   Bed locked and in lowest position, call light within reach, safety maintained.     Problem: Discharge Planning  Goal: Discharge to home or other facility with appropriate resources  Outcome: Progressing     Problem: Skin/Tissue Integrity  Goal: Absence of new skin breakdown  Description: 1.  Monitor for areas of redness and/or skin breakdown  2.  Assess vascular access sites hourly  3.  Every 4-6 hours minimum:  Change oxygen saturation probe site  4.  Every 4-6 hours:  If on nasal continuous positive airway pressure, respiratory therapy assess nares and determine need for appliance change or resting period.  Outcome: Progressing     Problem: Safety - Adult  Goal: Free from fall injury  Outcome: Progressing     Problem: Respiratory - Adult  Goal: Achieves optimal ventilation and oxygenation  Outcome: Progressing     Problem: Musculoskeletal - Adult  Goal: Return mobility to safest level of function  Outcome: Progressing     Problem: Musculoskeletal - Adult  Goal: Maintain proper alignment of affected body part  Outcome: Progressing     Problem: Musculoskeletal - Adult  Goal: Return ADL status to a safe level of function  Outcome: Progressing     Problem: Genitourinary - Adult  Goal: Absence of urinary retention  Outcome: Progressing     Problem: Genitourinary - Adult  Goal: Urinary catheter remains patent  Outcome: Progressing     Problem: Metabolic/Fluid and Electrolytes - Adult  Goal: Electrolytes maintained within normal limits  Outcome: Progressing     Problem: Metabolic/Fluid and Electrolytes - Adult  Goal: Hemodynamic stability and optimal renal function maintained  Outcome: Progressing     Problem: Pain  Goal: Verbalizes/displays adequate comfort level or baseline comfort level  Outcome: Progressing     Problem: Nutrition

## 2025-01-14 NOTE — PROGRESS NOTES
[facility]  Speech Language Pathology    Date: 1/14/2025  Patient Name: Mela Wood  YOB: 1945   AGE: 79 y.o.  MRN: [id number]        Attempted to call and speak with RN at 1615 as pts diet has not been changed in EPIC per recommendation from MBS and discussion completed earlier this date. Was unable to get in contact at this time. Please change diet level per recommendation and/or if code status/QOL of life decisions have been discussed this needs to be reflected also in chart.     Recommendation per MBS are as follows:     Recommended Diet:  Solid consistency: Soft and Bite-Sized  Liquid consistency: Other (comments) (Pudding/Extremely Thick liquids)  Liquid administration via: Spoon  Medication administration: Meds in puree      Completed by: Leticia Benz M.A., CCC-SLP        No respiratory distress. No stridor, Lungs sounds clear with good aeration bilaterally.

## 2025-01-14 NOTE — PROGRESS NOTES
[facility]  Office: [phone]  Karl Ocampo DO, Arthur Cornejo DO, Daren Birmingham DO, Erwin Galarza DO, Kamran Vizcaino MD, Carlotta Adkins MD, Rick Goodrich MD, Concha Machuca MD,  Barry Curry MD, Parul Olivia MD, Greg Porras MD,  Ty Carr DO, Ghulam Garcia MD, Alpesh Medina MD, Blake Ocampo DO, Barbara Flaherty MD,  Jose Stone DO, Ivett Keyes MD, Cassandra Mora MD, Dahlia Cheema MD, Hanna Domínguez MD,  Eric Mcgill MD, Nadege Morataya MD, Swathi Baxter MD, Madhav Gurrola MD, Jose Roberto MD, Santos Ramirez MD, vAi Victoria DO, Chris Desir MD, Ty Patton MD, Mohsin Reza, MD, Shirley Waterhouse, CNP,  Cynthia Bruce CNP, Avi Matta, ANASTACIO,  Nahomy Parada, VICENTA, Carolina Elaine, ANASTACIO, Vianca Whalen, CNP, Joslyn Jeter, ANASTACIO, Kathryn Chacon, CNP, Brittany Bolden, PA-C, Leighann Heath PA-C, Lydia Alexander, CNP, Saida Reyes, CNP,  Kayla Brown, CNP, Joycelyn Oconnor, CNP, Celia Martínez, CNP,  Ruth Monte, ANASTACIO, Eboni Watson, CNP       [facility]      Daily Progress Note     Admit Date: 1/10/2025  Bed/Room No.  1025/1025-01  Admitting Physician : Jose Roberto MD  Code Status :Limited  Hospital Day:  LOS: 4 days   Chief Complaint:     Chief Complaint   Patient presents with    Fall     Yesterday around 1600, found today. Denies LOC or thinners     Principal Problem:    Pathological fracture of lumbar vertebra due to secondary osteoporosis (HCC)  Active Problems:    Unwitnessed fall    Hyponatremia    Generalized weakness    Rhabdomyolysis    Urinary retention    Age-related osteoporosis with current pathological fracture    Intractable back pain    Atherosclerosis    Cervical strain, acute    Compression of lumbar vertebra (HCC)    Pneumonia of lower lobe due to infectious organism    Vitamin D deficiency    Failure to thrive in adult  Resolved Problems:    * No resolved hospital problems. *    Subjective :        Interval  SPINE WO CONTRAST   Final Result   1.  No acute fractures.      2.  Stable appearance of a chronic compression fracture of the T6 vertebral   body.         XR CHEST PORTABLE   Final Result   Right upper lobe airspace disease suggesting pneumonia. Recommend follow-up   to resolution.      Remote healed right rib fractures.         XR PELVIS (1-2 VIEWS)   Final Result   No acute fracture or dislocation              Clinical Course : unchanged  Assessment and Plan  :        Unwitnessed fall likely secondary to alcohol intoxication.  No withdrawal symptoms  Pain control  Acute L1 compression fracture secondary to fall  MRI lumbar spine reviewed.  Pain control  Orthopedic surgery signed off, s/p kyphoplasty on 1/13/2025.  Likely has osteoporosis.    Outpatient DEXA scan  Vitamin D replacement for deficiency.  L4-L5 spinal canal stenosis -MRI lumbar spine reviewed.  Untreated and uncontrolled hypertension   Toprol-XL 25 mg daily, increased amlodipine to 10  mg daily.  Added losartan 25 mg daily.  Known CAD with stent in 1990 at [place].  Patient has not seen a physician for long time.  Echocardiogram showed preserved LV function.  Will benefit with long-term aspirin.  Cardiology signed off, recommended resuming aspirin prior to discharge.  Hyponatremia secondary to poor oral intake and alcoholism.  Stop IV fluids.  Traumatic rhabdomyolysis from fall.  Treated with IV fluids.  CK improved.  KVO.  Emphysema lung -albuterol as needed.  On azithromycin and Rocephin for COPD exacerbation and right upper lobe pneumonia.  Recommend outpatient CT to rule out underlying mass.  Former smoker more than 50-pack-year smoking.  Quit about 1 year ago.  New onset type 2 diabetes mellitus-A1c 7.4.  Humalog as needed.  I recommend oral diabetic medication at discharge.  Urinary retention.  Unsuccessful attempts for Dawkins insertion in the emergency room.  Urology following.  Dawkins catheter placed.  Urology recommended void trial prior to

## 2025-01-14 NOTE — PROGRESS NOTES
[facility]  In Patient Cardiology Consult      Date:   1/14/2025  Patient name: Mela Wood  Date of admission:  1/10/2025 11:43 AM  MRN:   [id number]  YOB: 1945    Reason for Admission: Fall    CHIEF COMPLAINT: Fall    Subjective:  Pt seen and examined in the room.  Patient resting in bed. Pt denies any CP or sob.  Labs, vitals and tele reviewed- s/p kyhpo.        Current Facility-Administered Medications:     HYDROcodone-acetaminophen (NORCO) 5-325 MG per tablet 1 tablet, 1 tablet, Oral, Q6H PRN, Malik Davalos MD    vitamin D (ERGOCALCIFEROL) capsule 50,000 Units, 50,000 Units, Oral, Weekly, Malik Davalos MD, 50,000 Units at 01/13/25 0854    vitamin D3 (CHOLECALCIFEROL) tablet 5,000 Units, 5,000 Units, Oral, Daily, Malik Davalos MD, 5,000 Units at 01/13/25 0854    insulin glargine (LANTUS) injection vial 15 Units, 15 Units, SubCUTAneous, Nightly, Malik Davalos MD, 15 Units at 01/13/25 2058    levalbuterol (XOPENEX) nebulizer solution 1.25 mg, 1.25 mg, Nebulization, Q8H PRN, Malik Davalos MD, 1.25 mg at 01/12/25 0048    sodium chloride nebulizer 0.9 % solution 3 mL, 3 mL, Nebulization, Q8H PRN, Malik Davalos MD    morphine (PF) injection 1 mg, 1 mg, IntraVENous, Q4H PRN, Malik Davalos MD, 1 mg at 01/12/25 0049    clindamycin (CLEOCIN) 600 mg in dextrose 5 % 50 mL IVPB, 600 mg, IntraVENous, Once, Malik Davalos MD    atorvastatin (LIPITOR) tablet 20 mg, 20 mg, Oral, Daily, Malik Davalos MD, 20 mg at 01/13/25 2058    losartan (COZAAR) tablet 25 mg, 25 mg, Oral, Daily, Malik Davalos MD, 25 mg at 01/13/25 0854    amLODIPine (NORVASC) tablet 10 mg, 10 mg, Oral, Daily, Malik Davalos MD, 10 mg at 01/13/25 0854    insulin lispro (HUMALOG,ADMELOG) injection vial 0-4 Units, 0-4 Units, SubCUTAneous, 4x Daily AC & HS, Malik Davalos MD, 2 Units at 01/13/25 2058    glucose chewable tablet 16 g, 4 tablet, Oral, PRN, Malik Davalos MD    dextrose bolus 10% 125 mL, 125 mL,

## 2025-01-14 NOTE — PROGRESS NOTES
Spiritual Health History and Assessment/Progress Note  [facility]    (P) Spiritual/Emotional Needs,  ,  ,      Name: Mela Wood MRN: [id number]    Age: 79 y.o.     Sex: female   Language: English   Moravian: Faith   Pathological fracture of lumbar vertebra due to secondary osteoporosis (HCC)     Date: 1/14/2025            Total Time Calculated: (P) 8 min              Spiritual Assessment began in [facility]        Referral/Consult From: (P) Rounding   Encounter Overview/Reason: (P) Spiritual/Emotional Needs  Service Provided For: (P) Patient    Patient indicated right away \"today is not a good day for a visit.\"  acknowledged patient's feelings and preference but asked if prayer would be helpful. Patient replied, \"prayer is always helpful\" and added that she is Faith.  provided prayer and patient responded with a weak smile and welcomed a future visit.    Gwendolyn, Belief, Meaning:   Patient is connected with a gwendolyn tradition or spiritual practice and has beliefs or practices that help with coping during difficult times  Family/Friends No family/friends present      Importance and Influence:  Patient has spiritual/personal beliefs that influence decisions regarding their health  Family/Friends No family/friends present    Community:  Patient feels well-supported. Support system includes: Other: sisters and son  Family/Friends No family/friends present    Assessment and Plan of Care:     Patient Interventions include: Facilitated expression of thoughts and feelings and Affirmed coping skills/support systems  Family/Friends Interventions include: No family/friends present    Patient Plan of Care: Spiritual Care available upon further referral  Family/Friends Plan of Care: Spiritual Care available upon further referral    Electronically signed by Chaplain Jhonathan on 1/14/2025 at 11:37 AM

## 2025-01-14 NOTE — PLAN OF CARE
Pt resting comfortably in bed for most of the day. She did sit up in her chair for about an hour after working with PT/OT  Swallow study complete, see chart for detail  Dysphagia diet: Soft and bite size. Pudding thick liquid  Meds crushed in pudding   Dawkins in place  Sutures removed, Steri strips applied  All questions and concerns addressed  Bed is locked and in the lowest position with the call light in reach. Safety maintained.    Problem: Discharge Planning  Goal: Discharge to home or other facility with appropriate resources  1/14/2025 1820 by Miriam Dodd RN  Outcome: Progressing     Problem: Skin/Tissue Integrity  Goal: Absence of new skin breakdown  Description: 1.  Monitor for areas of redness and/or skin breakdown  2.  Assess vascular access sites hourly  3.  Every 4-6 hours minimum:  Change oxygen saturation probe site  4.  Every 4-6 hours:  If on nasal continuous positive airway pressure, respiratory therapy assess nares and determine need for appliance change or resting period.  1/14/2025 1820 by Miriam Dodd RN  Outcome: Progressing     Problem: Safety - Adult  Goal: Free from fall injury  1/14/2025 1820 by Miriam Dodd RN  Outcome: Progressing     Problem: Respiratory - Adult  Goal: Achieves optimal ventilation and oxygenation  1/14/2025 1820 by Miriam Dodd RN  Outcome: Progressing     Problem: Nutrition Deficit:  Goal: Optimize nutritional status  1/14/2025 1820 by Miriam Dodd RN  Outcome: Progressing

## 2025-01-14 NOTE — PROCEDURES
INSTRUMENTAL SWALLOW REPORT  MODIFIED BARIUM SWALLOW    NAME: Mela Wood   : 1945  MRN: [id number]       Date of Eval: 2025       Referring Diagnosis(es): Referring Diagnosis: Coughing with medications/water    Past Medical History:  has a past medical history of Atherosclerosis, Essential hypertension, Pneumonia of lower lobe due to infectious organism, and Seizures ([facility]).  Past Surgical History:  has a past surgical history that includes Cardiac surgery (); Upper gastrointestinal endoscopy (2018); pr esophagogastroduodenoscopy transoral diagnostic (N/A, 2018); Upper gastrointestinal endoscopy (2018); Upper gastrointestinal endoscopy (N/A, 2018); and Spine surgery (N/A, 2025).       Type of Study: Initial MBS       Recent CXR/CT of Chest: 1/10/24 XR CHEST PORTABLE   IMPRESSION:  Right upper lobe airspace disease suggesting pneumonia. Recommend follow-up  to resolution.     Remote healed right rib fractures.    Patient Complaints/Reason for Referral:  Mela Wood was referred for a MBS to assess the efficiency of his/her swallow function, assess for aspiration, and to make recommendations regarding safe dietary consistencies, effective compensatory strategies, and safe eating environment.       Onset of problem: Mela Wood is a 79 y.o. Non- / non  female who presents with Fall (Yesterday around 1600, found today. Denies LOC or thinners) and is admitted to the hospital for the management of Compression fracture of L1 vertebra, initial encounter ([facility]).     This is a 79-year-old female who presents to the emergency department due to a fall happening on  around 4 PM.  Patient has a chronic history of hypertension.  While in the emergency department her sodium was 125 and her lactic acid was 2.5, her total CK was 310, and her myoglobin was 207.  A CT of her lumbar spine was performed and revealed an acute compression

## 2025-01-14 NOTE — PROGRESS NOTES
Occupational Therapy  Facility/Department: [facility] PROGRESSIVE CARE  Occupational Therapy Initial Assessment    Name: Mela Wood  : 1945  MRN: [id number]  Date of Service: 2025      RAJESH WEINER reports patient is medically stable for therapy treatment this date.    Chart reviewed prior to treatment and patient is agreeable for therapy.  All lines intact and patient positioned comfortably at end of treatment.  All patient needs addressed prior to ending therapy session.       Pt currently functioning below baseline.  Recommend daily inpatient skilled therapy at time of discharge to maximize long term outcomes and prevent re-admission. Please refer to AM-PAC score for current level of function.       Discharge Recommendations:  Patient would benefit from continued therapy after discharge  OT Equipment Recommendations  Equipment Needed: Yes  Mobility Devices: ADL Assistive Devices  ADL Assistive Devices: Toileting - 3-in-1 Commode;Grab Bars - shower;Reacher;Sock-Aid Soft;Long-handled Shoe Horn;Long-handled Sponge;Emergency Alert System       Patient Diagnosis(es): The primary encounter diagnosis was Traumatic rhabdomyolysis, initial encounter ([facility]). Diagnoses of Pneumonia of lower lobe due to infectious organism, unspecified laterality, Compression fracture of lumbar vertebra, unspecified lumbar vertebral level, initial encounter ([facility]), Unwitnessed fall, and Compression fracture of L1 vertebra, sequela were also pertinent to this visit.  Past Medical History:  has a past medical history of Atherosclerosis, Essential hypertension, Pneumonia of lower lobe due to infectious organism, and Seizures ([facility]).  Past Surgical History:  has a past surgical history that includes Cardiac surgery (); Upper gastrointestinal endoscopy (2018); pr esophagogastroduodenoscopy transoral diagnostic (N/A, 2018); Upper gastrointestinal endoscopy (2018); Upper gastrointestinal endoscopy (N/A, 2018); and

## 2025-01-15 VITALS
HEIGHT: 65 IN | WEIGHT: 117.1 LBS | TEMPERATURE: 97.3 F | HEART RATE: 76 BPM | OXYGEN SATURATION: 92 % | BODY MASS INDEX: 19.51 KG/M2 | RESPIRATION RATE: 18 BRPM | DIASTOLIC BLOOD PRESSURE: 77 MMHG | SYSTOLIC BLOOD PRESSURE: 143 MMHG

## 2025-01-15 PROBLEM — M80.88XA PATHOLOGICAL FRACTURE OF LUMBAR VERTEBRA DUE TO SECONDARY OSTEOPOROSIS (HCC): Status: RESOLVED | Noted: 2025-01-11 | Resolved: 2025-01-15

## 2025-01-15 LAB
ANION GAP SERPL CALCULATED.3IONS-SCNC: 13 MMOL/L (ref 9–16)
BUN SERPL-MCNC: 14 MG/DL (ref 8–23)
CALCIUM SERPL-MCNC: 8.9 MG/DL (ref 8.8–10.2)
CHLORIDE SERPL-SCNC: 96 MMOL/L (ref 98–107)
CO2 SERPL-SCNC: 22 MMOL/L (ref 20–31)
CREAT SERPL-MCNC: 0.6 MG/DL (ref 0.5–0.9)
ERYTHROCYTE [DISTWIDTH] IN BLOOD BY AUTOMATED COUNT: 11.8 % (ref 11.8–14.4)
GFR, ESTIMATED: >90 ML/MIN/1.73M2
GLUCOSE BLD-MCNC: 162 MG/DL (ref 65–105)
GLUCOSE BLD-MCNC: 169 MG/DL (ref 65–105)
GLUCOSE BLD-MCNC: 185 MG/DL (ref 65–105)
GLUCOSE SERPL-MCNC: 146 MG/DL (ref 82–115)
HCT VFR BLD AUTO: 35.6 % (ref 36.3–47.1)
HGB BLD-MCNC: 12.3 G/DL (ref 11.9–15.1)
MAGNESIUM SERPL-MCNC: 1.7 MG/DL (ref 1.6–2.4)
MCH RBC QN AUTO: 29.4 PG (ref 25.2–33.5)
MCHC RBC AUTO-ENTMCNC: 34.6 G/DL (ref 28.4–34.8)
MCV RBC AUTO: 85 FL (ref 82.6–102.9)
NRBC BLD-RTO: 0 PER 100 WBC
PLATELET # BLD AUTO: 280 K/UL (ref 138–453)
PMV BLD AUTO: 10 FL (ref 8.1–13.5)
POTASSIUM SERPL-SCNC: 3.3 MMOL/L (ref 3.7–5.3)
RBC # BLD AUTO: 4.19 M/UL (ref 3.95–5.11)
SODIUM SERPL-SCNC: 131 MMOL/L (ref 136–145)
WBC OTHER # BLD: 6.7 K/UL (ref 3.5–11.3)

## 2025-01-15 PROCEDURE — 80048 BASIC METABOLIC PNL TOTAL CA: CPT

## 2025-01-15 PROCEDURE — 51798 US URINE CAPACITY MEASURE: CPT

## 2025-01-15 PROCEDURE — 6370000000 HC RX 637 (ALT 250 FOR IP): Performed by: UROLOGY

## 2025-01-15 PROCEDURE — 6370000000 HC RX 637 (ALT 250 FOR IP): Performed by: ORTHOPAEDIC SURGERY

## 2025-01-15 PROCEDURE — 85027 COMPLETE CBC AUTOMATED: CPT

## 2025-01-15 PROCEDURE — 97112 NEUROMUSCULAR REEDUCATION: CPT

## 2025-01-15 PROCEDURE — 97110 THERAPEUTIC EXERCISES: CPT

## 2025-01-15 PROCEDURE — 6360000002 HC RX W HCPCS: Performed by: ORTHOPAEDIC SURGERY

## 2025-01-15 PROCEDURE — 99239 HOSP IP/OBS DSCHRG MGMT >30: CPT | Performed by: STUDENT IN AN ORGANIZED HEALTH CARE EDUCATION/TRAINING PROGRAM

## 2025-01-15 PROCEDURE — 97530 THERAPEUTIC ACTIVITIES: CPT

## 2025-01-15 PROCEDURE — 6360000002 HC RX W HCPCS: Performed by: STUDENT IN AN ORGANIZED HEALTH CARE EDUCATION/TRAINING PROGRAM

## 2025-01-15 PROCEDURE — 82947 ASSAY GLUCOSE BLOOD QUANT: CPT

## 2025-01-15 PROCEDURE — 97535 SELF CARE MNGMENT TRAINING: CPT

## 2025-01-15 PROCEDURE — 83735 ASSAY OF MAGNESIUM: CPT

## 2025-01-15 PROCEDURE — 2500000003 HC RX 250 WO HCPCS: Performed by: ORTHOPAEDIC SURGERY

## 2025-01-15 PROCEDURE — 2700000000 HC OXYGEN THERAPY PER DAY

## 2025-01-15 PROCEDURE — 36415 COLL VENOUS BLD VENIPUNCTURE: CPT

## 2025-01-15 RX ORDER — CITALOPRAM HYDROBROMIDE 20 MG/1
20 TABLET ORAL DAILY
Qty: 30 TABLET | Refills: 3 | Status: SHIPPED | OUTPATIENT
Start: 2025-01-15

## 2025-01-15 RX ORDER — ATORVASTATIN CALCIUM 20 MG/1
20 TABLET, FILM COATED ORAL DAILY
Qty: 30 TABLET | Refills: 3 | Status: SHIPPED | OUTPATIENT
Start: 2025-01-15

## 2025-01-15 RX ORDER — ERGOCALCIFEROL 1.25 MG/1
50000 CAPSULE ORAL WEEKLY
Qty: 5 CAPSULE | Refills: 0 | Status: SHIPPED | OUTPATIENT
Start: 2025-01-20 | End: 2025-02-19

## 2025-01-15 RX ORDER — TAMSULOSIN HYDROCHLORIDE 0.4 MG/1
0.4 CAPSULE ORAL DAILY
Status: DISCONTINUED | OUTPATIENT
Start: 2025-01-15 | End: 2025-01-15 | Stop reason: HOSPADM

## 2025-01-15 RX ORDER — TAMSULOSIN HYDROCHLORIDE 0.4 MG/1
0.4 CAPSULE ORAL DAILY
Qty: 30 CAPSULE | Refills: 3 | Status: SHIPPED | OUTPATIENT
Start: 2025-01-16

## 2025-01-15 RX ORDER — POTASSIUM CHLORIDE 7.45 MG/ML
10 INJECTION INTRAVENOUS
Status: DISPENSED | OUTPATIENT
Start: 2025-01-15 | End: 2025-01-15

## 2025-01-15 RX ORDER — HYDROCODONE BITARTRATE AND ACETAMINOPHEN 5; 325 MG/1; MG/1
1 TABLET ORAL EVERY 6 HOURS PRN
Qty: 10 TABLET | Refills: 0 | Status: SHIPPED | OUTPATIENT
Start: 2025-01-15 | End: 2025-01-18

## 2025-01-15 RX ORDER — METOPROLOL TARTRATE 50 MG
50 TABLET ORAL 2 TIMES DAILY
Qty: 60 TABLET | Refills: 3 | Status: SHIPPED | OUTPATIENT
Start: 2025-01-15

## 2025-01-15 RX ORDER — MIRTAZAPINE 7.5 MG/1
7.5 TABLET, FILM COATED ORAL NIGHTLY
Qty: 30 TABLET | Refills: 3 | Status: SHIPPED | OUTPATIENT
Start: 2025-01-15

## 2025-01-15 RX ORDER — AMLODIPINE BESYLATE 10 MG/1
10 TABLET ORAL DAILY
Qty: 30 TABLET | Refills: 3 | Status: SHIPPED | OUTPATIENT
Start: 2025-01-16

## 2025-01-15 RX ADMIN — INSULIN LISPRO 1 UNITS: 100 INJECTION, SOLUTION INTRAVENOUS; SUBCUTANEOUS at 18:09

## 2025-01-15 RX ADMIN — TAMSULOSIN HYDROCHLORIDE 0.4 MG: 0.4 CAPSULE ORAL at 13:25

## 2025-01-15 RX ADMIN — METOPROLOL SUCCINATE 25 MG: 25 TABLET, EXTENDED RELEASE ORAL at 09:37

## 2025-01-15 RX ADMIN — POTASSIUM CHLORIDE 10 MEQ: 7.46 INJECTION, SOLUTION INTRAVENOUS at 17:31

## 2025-01-15 RX ADMIN — POTASSIUM CHLORIDE 10 MEQ: 7.46 INJECTION, SOLUTION INTRAVENOUS at 15:24

## 2025-01-15 RX ADMIN — CHOLECALCIFEROL TAB 125 MCG (5000 UNIT) 5000 UNITS: 125 TAB at 09:37

## 2025-01-15 RX ADMIN — POTASSIUM CHLORIDE 10 MEQ: 7.46 INJECTION, SOLUTION INTRAVENOUS at 13:13

## 2025-01-15 RX ADMIN — AMLODIPINE BESYLATE 10 MG: 10 TABLET ORAL at 09:37

## 2025-01-15 RX ADMIN — POTASSIUM CHLORIDE 10 MEQ: 7.46 INJECTION, SOLUTION INTRAVENOUS at 16:29

## 2025-01-15 RX ADMIN — POTASSIUM CHLORIDE 10 MEQ: 7.46 INJECTION, SOLUTION INTRAVENOUS at 14:21

## 2025-01-15 RX ADMIN — POTASSIUM CHLORIDE 10 MEQ: 7.46 INJECTION, SOLUTION INTRAVENOUS at 12:04

## 2025-01-15 RX ADMIN — SODIUM CHLORIDE, PRESERVATIVE FREE 10 ML: 5 INJECTION INTRAVENOUS at 09:37

## 2025-01-15 RX ADMIN — LOSARTAN POTASSIUM 25 MG: 25 TABLET, FILM COATED ORAL at 09:37

## 2025-01-15 NOTE — DISCHARGE INSTRUCTIONS
Follow-up outpatient with PCP, orthopedic and urology.  Continue medications as prescribed.  Fall precautions and aspiration precaution.  Continue to monitor for urinary retention.  Continue recommended diet  Need to discuss with PCP about her morning medications as patient is not taking multiple cardiac and psych meds  Recommended Diet:  Solid consistency: Soft and Bite-Sized  Liquid consistency: Other (comments) (Pudding/Extremely Thick liquids)  Liquid administration via: Spoon  Medication administration: Meds in puree

## 2025-01-15 NOTE — DISCHARGE SUMMARY
[facility]  Office: [phone]  Karl Ocampo DO, Arthur Cornejo DO, Daren Birmingham DO, Erwin Galarza DO, Kamran Vizcaino MD, Carlotta Adkins MD, Rick Goodrich MD, Concha Machuca MD,  Barry Curry MD, Parul Olivia MD, Artur Marks DO, Greg Porras MD,  Ty Carr DO, Ghulam Garcia MD, Alpesh Medina MD, Blake Ocampo DO, Barbara Flaherty MD,  Jose Stone DO, Ivett Keyes MD, Cassandra Mora MD, Dahlia Cheema MD, Hanna Domínguez MD,  Eric Mcgill MD, Nadege Morataya MD, Swathi Baxter MD, Jeffry Ray DO, Krystal Walker MD,  Chris Desir MD, Shirley Waterhouse, CNP,  Cynthia Bruce, CNP, Celia Martínez, CNP, Avi Matta, CNP,  Nahomy Parada, DNP, Carolina Elaine, CNP, Vianca Whalen, CNP, Tia Kline, CNP, Joslyn Jeter, CNP, Kathryn Chacon, CNP, Johnie Simpson PA-C, Nery Ortez, CNS, Bonny Borges, CNP, Ruth Monte, CNP         [facility]   IN-PATIENT SERVICE   [facility]    Discharge Summary     Patient ID: Mela Wood  :  1945   MRN: [id number]     ACCOUNT:  [id number]   Patient's PCP: No primary care provider on file.  Admit Date: 1/10/2025   Discharge Date: 1/15/2025  Length of Stay: 5  Code Status:  Limited  Admitting Physician: No admitting provider for patient encounter.  Discharge Physician: Jose Roberto MD     Active Discharge Diagnoses:     Hospital Problem Lists:  Principal Problem (Resolved):    Pathological fracture of lumbar vertebra due to secondary osteoporosis (HCC)  Active Problems:    Unwitnessed fall    Hyponatremia    Generalized weakness    Severe malnutrition (HCC)    Rhabdomyolysis    Urinary retention    Age-related osteoporosis with current pathological fracture    Intractable back pain    Atherosclerosis    Cervical strain, acute    Compression of lumbar vertebra (HCC)    Pneumonia of lower lobe due to infectious organism    Vitamin D deficiency    Failure to thrive in

## 2025-01-15 NOTE — PROGRESS NOTES
Physical Therapy  Facility/Department: [facility] CARE   Physical Therapy Daily Treatment Note    Patient Name: Mela Wood        MRN: [id number]    : 1945    Date of Service: 1/15/2025    Chief Complaint   Patient presents with    Fall     Yesterday around 1600, found today. Denies LOC or thinners     Past Medical History:  has a past medical history of Atherosclerosis, Essential hypertension, Pneumonia of lower lobe due to infectious organism, and Seizures (HCC).  Past Surgical History:  has a past surgical history that includes Cardiac surgery (); Upper gastrointestinal endoscopy (2018); pr esophagogastroduodenoscopy transoral diagnostic (N/A, 2018); Upper gastrointestinal endoscopy (2018); Upper gastrointestinal endoscopy (N/A, 2018); and Spine surgery (N/A, 2025).    Discharge Recommendations  Discharge Recommendations: Patient would benefit from continued therapy after discharge, Therapy recommended at discharge, 24 hour supervision or assist       Assessment  Body Structures, Functions, Activity Limitations Requiring Skilled Therapeutic Intervention: Decreased functional mobility ;Decreased ADL status;Decreased strength;Decreased safe awareness;Decreased endurance;Decreased balance;Decreased posture;Increased pain  Assessment: Pt with LESS  deficits of bed mobility, transfers, ambulation, balance, posture, safety awareness and endurance this session, & required 2 assist for SAFE functional mobility. Pt able to stand to R/W this session but steps still unsteady & Pt VERY fearful of falling & even with MAX encoragement not willing to attempt ambulation with R/W AWAY from EOB. Pt currently 2 assist with Catina Mcguire to mobilize to BR and to CHAIR. Pt still INC risk for falls & requires continued PT to maximize independence with functional mobility, balance, safety awareness & activity tolerance. Pt currently functioning below baseline with acute change of  transitions with bed mobility & transfers, seated/standing posture, pressure relief, seated & standing postural alignment and breathing techniques, safety/scanning, & fall prevention in ambulation techniques.        Electronically signed by KAYLA RIVERA PT on 1/15/25 at 12:31 PM EST

## 2025-01-15 NOTE — PROGRESS NOTES
Advance Care Planning     Advance Care Planning Inpatient Note  [facility] Department    Today's Date: 1/15/2025  Unit: [facility]    Received request from IDT Member.  Upon review of chart and communication with care team, patient's decision making abilities are not in question.. Healthcare Decision Maker was/were present in the room during visit.    Goals of ACP Conversation:  Discuss advance care planning documents    Health Care Decision Makers:     No healthcare decision makers have been documented.    Summary:  Completed New Documents    Advance Care Planning Documents (Patient Wishes):  Healthcare Power of /Advance Directive Appointment of Health Care Agent     Assessment:    Patient expressed her wishes and full understanding of Healthcare Power of  paperwork. Document completed per wishes at time of visit with sister designated to speak for patient.    Interventions:  Assisted in the completion of documents according to patient's wishes at this time    Care Preferences Communicated:   No    Outcomes/Plan:  New advance directive completed.  Returned original document(s) to patient, as well as copies for distribution to appointed agents  Copy of advance directive given to staff to scan into medical record.    Electronically signed by Chaplain Jhonathan on 1/15/2025 at 2:12 PM

## 2025-01-15 NOTE — FLOWSHEET NOTE
01/13/25 0806   Vital Signs   BP (!) 180/89   MAP (Calculated) 119   MAP (mmHg) 115     Writer administered pts morning medications such has losartan, metoprolol and amlodipine.     0945: recheck /76    Care ongoing   
   01/15/25 1619   Urine Assessment   Bladder Scan Volume (mL) 118 mL   $ Bladder scan $ Yes     Patient has yet to void since roca removal. Bladder scan showed 118mL. Patient states she does not have the urge to urinate. Intake status has been low d/t pudding thick diet order. Dr Regalado notified, OK for discharge to SNF with urology follow up in 1 week. Patient encouraged to drink fluids to stay hydrated while moving forward    Discharge order in place, plan is 6pm pickup to [facility]  
Detail Level: Simple
Detail Level: Generalized

## 2025-01-15 NOTE — DISCHARGE INSTR - COC
Continuity of Care Form    Patient Name: Mela Wood   :  1945  MRN:  [id number]    Admit date:  1/10/2025  Discharge date:  1/15/25    Code Status Order: Limited   Advance Directives:   Advance Care Flowsheet Documentation             Admitting Physician:  No admitting provider for patient encounter.  PCP: No primary care provider on file.    Discharging Nurse: Christy  Discharging Hospital Unit/Room#: 1025/1025-01  Discharging Unit Phone Number: [phone]    Emergency Contact:   Extended Emergency Contact Information  Primary Emergency Contact: LianaskDarlene ibarra   [facility]  Home Phone: [phone]  Mobile Phone: [phone]  Relation: Brother/Sister  Secondary Emergency Contact: Araceli Greenwood   [facility]  Home Phone: [phone]  Mobile Phone: [phone]  Relation: Brother/Sister    Past Surgical History:  Past Surgical History:   Procedure Laterality Date    CARDIAC SURGERY      stent in [place]    [place] ESOPHAGOGASTRODUODENOSCOPY TRANSORAL DIAGNOSTIC N/A 2018    EGD DIAGNOSTIC ONLY performed by Piero Goodrich MD at [facility] OR    SPINE SURGERY N/A 2025    KYPHOPLASTY L1 WITH BIOPSY performed by Malik Davalos MD at [facility] OR    UPPER GASTROINTESTINAL ENDOSCOPY  2018    UPPER GASTROINTESTINAL ENDOSCOPY  2018    with biopsy by Dr. Griffin    UPPER GASTROINTESTINAL ENDOSCOPY N/A 2018    EGD BIOPSY performed by Joshua Griffin MD at [facility] OR       Immunization History:     There is no immunization history on file for this patient.    Active Problems:  Patient Active Problem List   Diagnosis Code    Anemia D64.9    Unwitnessed fall R29.6    PVD (peripheral vascular disease) (HCC) I73.9    Chronic alcohol abuse F10.10    Pulmonary nodules/lesions, multiple R91.8    Tobacco abuse Z72.0    Dizziness R42    Hyponatremia E87.1    Hypokalemia E87.6    Generalized weakness R53.1    Normocytic normochromic anemia D64.9    History of fall

## 2025-01-15 NOTE — PROGRESS NOTES
Patient discharged to [facility] via [facility]. Report given to RAJESH English. All questions answered

## 2025-01-15 NOTE — PROGRESS NOTES
Occupational Therapy  Facility/Department: [facility] PROGRESSIVE CARE  Daily Treatment Note  NAME: Mela Wood  : 1945  MRN: [id number]      RAJESH VALLES reports patient is medically stable for therapy treatment this date.    Chart reviewed prior to treatment and patient is agreeable for therapy.  All lines intact and patient positioned comfortably at end of treatment.  All patient needs addressed prior to ending therapy session.           Pt currently functioning below baseline.  Recommend daily inpatient skilled therapy at time of discharge to maximize long term outcomes and prevent re-admission. Please refer to AM-PAC score for current level of function.       Date of Service: 1/15/2025    Discharge Recommendations:  Patient would benefit from continued therapy after discharge  OT Equipment Recommendations  Equipment Needed: Yes  ADL Assistive Devices: Toileting - 3-in-1 Commode;Grab Bars - shower;Reacher;Sock-Aid Soft;Long-handled Shoe Horn;Long-handled Sponge;Emergency Alert System      Patient Diagnosis(es): The primary encounter diagnosis was Traumatic rhabdomyolysis, initial encounter ([facility]). Diagnoses of Pneumonia of lower lobe due to infectious organism, unspecified laterality, Compression fracture of lumbar vertebra, unspecified lumbar vertebral level, initial encounter ([facility]), Unwitnessed fall, and Compression fracture of L1 vertebra, sequela were also pertinent to this visit.     Assessment   Assessment: Pt has improved with bed mob tasks and sit to stands with use of skyler stedy however continues to require 2 staff assist.  Pt is quick to give up and say I can't without giving full effort during functional tasks.  Pt with increased falt affect and appears frustrated and agitated with ST recommendations for swallow study with pudding thick liquids for consistency.  Pt is unable to march in place/weight shift to take steps and continues to require lift to mobilize pt.  Continue with OT skills as  poor to fair understanding     Patient Education  Education Given To: Patient  Education Provided: Role of Therapy;Plan of Care;Transfer Training;Precautions;Fall Prevention Strategies;Energy Conservation;Equipment (skyler stedy lift)  Education Provided Comments: back precautions/importance of adherence, safety in function, call light use, pursed lip breathing, proper bed mob tech/proper log rolling, benefits of being up/recommendations for continued therapy, postural control and weight shifting, importance of giving full effort/not being so quick to give up; importance of drinking even with ST recommendations to avoid dehydration  Education Method: Demonstration;Verbal  Barriers to Learning: Cognition;Hearing  Education Outcome: Verbalized understanding;Continued education needed    Goals  Short Term Goals  Time Frame for Short Term Goals: by discharge, pt to demo  Short Term Goal 1: bed mob tasks with use of rail/proper log rolling tech with mod assist of 1.  Short Term Goal 2: I with BUE ROM HEP with use of handouts to maintain functional use as well as back precautions in function.  Short Term Goal 3: UB ADL to set up and LB ADL to mod assist of 1 and use of AD/AE as needed.  Short Term Goal 4: ADL transfers with use of AD to mod assist of 1 (add functional mob goal to POC as appropriate).  Long Term Goals  Long Term Goal 1: Pt to stand with min assist and AD girish > 5 mins as able to reduce falls with ADL's.  Long Term Goal 2: Pt to complete toileting tasks with use of AD/BSC and grab bar as needed to min assist.  Long Term Goal 3: Pt/caregivers to be I with pressure relief, EC/WS and fall  prevention tech and DME/AE recommendations with use of handouts.  Patient Goals   Patient goals : Pt states she wants to get moving!      Co-treatment with PT warranted secondary to decreased safety and independence requiring 2 skilled therapy professionals to address individual discipline's goals. OT addressing preparation for

## 2025-01-15 NOTE — PROGRESS NOTES
Maximilian Regalado MD   Urology Progress Note            Subjective: Follow-up urinary retention difficult catheterization    Patient Vitals for the past 24 hrs:   BP Temp Temp src Pulse Resp SpO2 Weight   01/15/25 0445 126/66 99 °F (37.2 °C) Axillary 92 24 92 % 53.1 kg (117 lb 1.6 oz)   01/15/25 0145 (!) 142/81 97.2 °F (36.2 °C) Axillary 86 24 92 % --   01/14/25 2045 134/84 98.2 °F (36.8 °C) Axillary 92 21 94 % --   01/14/25 1629 (!) 153/87 97.3 °F (36.3 °C) Axillary 88 18 95 % --   01/14/25 1145 127/82 97.5 °F (36.4 °C) Oral 81 16 (!) 89 % --   01/14/25 0848 (!) 142/100 97.3 °F (36.3 °C) -- 98 -- (!) 89 % --       Intake/Output Summary (Last 24 hours) at 1/15/2025 0657  Last data filed at 1/15/2025 0417  Gross per 24 hour   Intake --   Output 220 ml   Net -220 ml       Recent Labs     01/13/25  0513 01/14/25  0541 01/15/25  0530   WBC 5.9 6.9 6.7   HGB 12.3 13.4 12.3   HCT 34.9* 38.4 35.6*   MCV 84.1 84.6 85.0    314 280     Recent Labs     01/13/25  0513 01/14/25  0541 01/15/25  0530   * 127* 131*   K 3.9 3.5* 3.3*   CL 95* 93* 96*   CO2 22 21 22   BUN 9 10 14   CREATININE 0.6 0.5 0.6       No results for input(s): \"COLORU\", \"PHUR\", \"LABCAST\", \"WBCUA\", \"RBCUA\", \"MUCUS\", \"TRICHOMONAS\", \"YEAST\", \"BACTERIA\", \"CLARITYU\", \"SPECGRAV\", \"LEUKOCYTESUR\", \"UROBILINOGEN\", \"BILIRUBINUR\", \"BLOODU\" in the last 72 hours.    Invalid input(s): \"NITRATE\", \"GLUCOSEUKETONESUAMORPHOUS\"    Additional Lab/culture results:    Physical Exam: Patient alert not in acute distress, catheter was removed for a void trial, there is no evidence of bladder distention at this time, discharge planning for today after resolving the swallow studies concerns    Interval Imaging Findings:    Impression:    Patient Active Problem List   Diagnosis    Anemia    Unwitnessed fall    PVD (peripheral vascular disease) (HCC)    Chronic alcohol abuse    Pulmonary nodules/lesions, multiple    Tobacco abuse    Dizziness

## 2025-01-15 NOTE — CARE COORDINATION
Discharge planning    Chart reviewed. [facility]  accepted. Will need pre cert started once medically cleared.     S/P kyphoplasty. Ortho notes can discharge.     ST completed MBSS  Recommended Diet:  Solid consistency: Soft and Bite-Sized  Liquid consistency: Other (comments) (Pudding/Extremely Thick liquids)  Liquid administration via: Spoon  Medication administration: Meds in puree

## 2025-01-15 NOTE — PLAN OF CARE
Pt spent a fair shift and slept through the night with no complains. Tolerated her  medications crushed with pudding, no coughing or signs of aspiration noted. Dr. Davalos is ok for discharge with F/U in 6 weeks after discharge. Urology recommends void trial prior to discharge. Cardiology signed off and recommend starting pt on Aspirin daily prior to discharge.         Problem: Discharge Planning  Goal: Discharge to home or other facility with appropriate resources  1/14/2025 2356 by Edita Ochoa, RN  Outcome: Progressing  Flowsheets (Taken 1/14/2025 2000)  Discharge to home or other facility with appropriate resources:   Identify barriers to discharge with patient and caregiver   Arrange for needed discharge resources and transportation as appropriate   Identify discharge learning needs (meds, wound care, etc)   Refer to discharge planning if patient needs post-hospital services based on physician order or complex needs related to functional status, cognitive ability or social support system    Problem: Skin/Tissue Integrity  Goal: Absence of new skin breakdown  Description: 1.  Monitor for areas of redness and/or skin breakdown    Problem: Safety - Adult  Goal: Free from fall injury  1/14/2025 2356 by Edita Ochoa, RN  Outcome: Progressing     Problem: Metabolic/Fluid and Electrolytes - Adult  Goal: Electrolytes maintained within normal limits  Outcome: Progressing  Flowsheets (Taken 1/14/2025 2000)  Electrolytes maintained within normal limits: Monitor labs and assess patient for signs and symptoms of electrolyte imbalances

## 2025-01-17 LAB — SURGICAL PATHOLOGY REPORT: NORMAL

## 2025-01-27 NOTE — PROGRESS NOTES
Physician Progress Note      PATIENT:               TICO THIBODEAUX  [facility] #:                  [id number]  :                       1945  ADMIT DATE:       1/10/2025 11:43 AM  DISCH DATE:        1/15/2025 6:25 PM  RESPONDING  PROVIDER #:        Jose Roberto MD          QUERY TEXT:    Patient admitted with L1 compression fracture s/p fall.  Per dietitian, pt   noted to have mild malnutrition.  Per  query response, pt noted to have   moderate malnutrition.  Per  IM PN and discharge summary, pt noted to have   severe malnutrition.  If possible, please document in progress notes and   discharge summary if you are evaluating and /or treating any of the following:    The medical record reflects the following:  Risk Factors: age 79, hx alcoholism, inadequate oral intake, new onset DM 2,   PNA  Clinical Indicators: Per dietitian, pt noted to have mild malnutrition.  Per    query response, pt noted to have moderate malnutrition.  Per  IM PN   and discharge summary, pt noted to have severe malnutrition.  Pt noted to have   mild body fat loss and mild muscle mass loss; BMI 19; hyponatremia; Recurrent   falls/weakness  Treatment: dietitian consult, cardiac diet when able to advance, supplements   to be ordered when on po diet, encourage good po intake, diabetes diet   education, I&Os, vitamin D, IVF, labs    ASPEN Criteria:    https://aspenjournals.onlinelibrary.inman.com/doi/full/[email or web address]  5  Options provided:  -- Mild malnutrition confirmed  -- Moderate malnutrition confirmed  -- Severe malnutrition confirmed as evidenced by, Please document ASPEN   criteria  -- Other - I will add my own diagnosis  -- Disagree - Not applicable / Not valid  -- Disagree - Clinically unable to determine / Unknown  -- Refer to Clinical Documentation Reviewer    PROVIDER RESPONSE TEXT:    After study, mild malnutrition confirmed.    Query created by: Nahomy Moyer on 2025 3:01

## (undated) DEVICE — BLOCK BITE 60FR RUBBER ADLT DENTAL

## (undated) DEVICE — SYRINGE MED 30ML STD CLR PLAS LUERLOCK TIP N CTRL DISP

## (undated) DEVICE — DRAPE,MEDI-SLUSH,STERILE: Brand: MEDLINE

## (undated) DEVICE — JCKSON TBL POSTNER NO HD REST: Brand: MEDLINE INDUSTRIES, INC.

## (undated) DEVICE — Device: Brand: DEFENDO VALVE AND CONNECTOR KIT

## (undated) DEVICE — YANKAUER,FLEXIBLE HANDLE,REGLR CAPACITY: Brand: MEDLINE INDUSTRIES, INC.

## (undated) DEVICE — DRAPE C ARM W/ POLY STRP W42XL72IN FOR MOB XR

## (undated) DEVICE — NEEDLE SPNL 18GA L3.5IN W/ QNCKE SHARPER BVL DURA CLICK

## (undated) DEVICE — BONE BIOPSY DEVICE F05A BBD SIZE 3: Brand: MEDTRONIC REUSABLE INSTRUMENTS

## (undated) DEVICE — DRESSING PETRO W3XL8IN OIL EMUL N ADH GZ KNIT IMPREG CELOS

## (undated) DEVICE — PAD,NON-ADHERENT,3X8,STERILE,LF,1/PK: Brand: MEDLINE

## (undated) DEVICE — MIXER A07A CEMENT

## (undated) DEVICE — 1010 S-DRAPE TOWEL DRAPE 10/BX: Brand: STERI-DRAPE™

## (undated) DEVICE — SUTURE NONABSORBABLE MONOFILAMENT 3-0 PS-1 18 IN BLK ETHILON 1663H

## (undated) DEVICE — TUBING, SUCTION, 1/4" X 12', STRAIGHT: Brand: MEDLINE

## (undated) DEVICE — STAZ MINOR LAPAROTOMY: Brand: MEDLINE INDUSTRIES, INC.

## (undated) DEVICE — GLOVE SURG SZ 8 THK118MIL BLK LTX FREE POLYISOPRENE BEAD CUF

## (undated) DEVICE — CANNULA NSL AD TBNG L7FT PVC STR NONFLARED PRNG O2 DEL W STD

## (undated) DEVICE — INTENDED FOR TISSUE SEPARATION, AND OTHER PROCEDURES THAT REQUIRE A SHARP SURGICAL BLADE TO PUNCTURE OR CUT.: Brand: BARD-PARKER ® CARBON RIB-BACK BLADES

## (undated) DEVICE — BONE TAMP KIT KPX153PB FF X2 15/3 1 STP: Brand: KYPHOPAK™ TRAY

## (undated) DEVICE — CUSHION PRONEVIEW L HD NK FOAM

## (undated) DEVICE — CONTAINER,SPECIMEN,OR STERILE,4OZ: Brand: MEDLINE

## (undated) DEVICE — DEVICE INFLATION W/ SYR KYPHON

## (undated) DEVICE — NEEDLE, QUINCKE, 18GX3.5": Brand: MEDLINE